# Patient Record
Sex: FEMALE | Race: WHITE | HISPANIC OR LATINO | Employment: OTHER | URBAN - METROPOLITAN AREA
[De-identification: names, ages, dates, MRNs, and addresses within clinical notes are randomized per-mention and may not be internally consistent; named-entity substitution may affect disease eponyms.]

---

## 2017-07-15 ENCOUNTER — HOSPITAL ENCOUNTER (EMERGENCY)
Facility: HOSPITAL | Age: 72
Discharge: HOME | End: 2017-07-15
Attending: EMERGENCY MEDICINE
Payer: MEDICARE

## 2017-07-15 VITALS
RESPIRATION RATE: 18 BRPM | OXYGEN SATURATION: 99 % | WEIGHT: 220 LBS | HEIGHT: 62 IN | TEMPERATURE: 98 F | DIASTOLIC BLOOD PRESSURE: 62 MMHG | SYSTOLIC BLOOD PRESSURE: 207 MMHG | HEART RATE: 67 BPM | BODY MASS INDEX: 40.48 KG/M2

## 2017-07-15 DIAGNOSIS — R07.89 NON-CARDIAC CHEST PAIN: ICD-10-CM

## 2017-07-15 DIAGNOSIS — R07.9 CHEST PAIN: ICD-10-CM

## 2017-07-15 DIAGNOSIS — R73.9 HYPERGLYCEMIA: ICD-10-CM

## 2017-07-15 DIAGNOSIS — N28.9 FUNCTION KIDNEY DECREASED: ICD-10-CM

## 2017-07-15 DIAGNOSIS — I10 ESSENTIAL HYPERTENSION: Primary | ICD-10-CM

## 2017-07-15 LAB
ALBUMIN SERPL BCP-MCNC: 3.6 G/DL
ALP SERPL-CCNC: 101 U/L
ALT SERPL W/O P-5'-P-CCNC: 14 U/L
ANION GAP SERPL CALC-SCNC: 11 MMOL/L
AST SERPL-CCNC: 14 U/L
BASOPHILS # BLD AUTO: 0.01 K/UL
BASOPHILS NFR BLD: 0.1 %
BILIRUB SERPL-MCNC: 0.8 MG/DL
BNP SERPL-MCNC: 472 PG/ML
BUN SERPL-MCNC: 33 MG/DL
CALCIUM SERPL-MCNC: 9 MG/DL
CHLORIDE SERPL-SCNC: 102 MMOL/L
CO2 SERPL-SCNC: 24 MMOL/L
CREAT SERPL-MCNC: 2 MG/DL
DIFFERENTIAL METHOD: ABNORMAL
EOSINOPHIL # BLD AUTO: 0.1 K/UL
EOSINOPHIL NFR BLD: 1.2 %
ERYTHROCYTE [DISTWIDTH] IN BLOOD BY AUTOMATED COUNT: 13.4 %
EST. GFR  (AFRICAN AMERICAN): 29 ML/MIN/1.73 M^2
EST. GFR  (NON AFRICAN AMERICAN): 25 ML/MIN/1.73 M^2
GLUCOSE SERPL-MCNC: 388 MG/DL
HCT VFR BLD AUTO: 41.5 %
HGB BLD-MCNC: 13.2 G/DL
LYMPHOCYTES # BLD AUTO: 1.5 K/UL
LYMPHOCYTES NFR BLD: 22.2 %
MCH RBC QN AUTO: 29.4 PG
MCHC RBC AUTO-ENTMCNC: 31.8 %
MCV RBC AUTO: 92 FL
MONOCYTES # BLD AUTO: 0.3 K/UL
MONOCYTES NFR BLD: 4.6 %
NEUTROPHILS # BLD AUTO: 4.8 K/UL
NEUTROPHILS NFR BLD: 71.6 %
PLATELET # BLD AUTO: 177 K/UL
PMV BLD AUTO: 11.7 FL
POCT GLUCOSE: 308 MG/DL (ref 70–110)
POCT GLUCOSE: 362 MG/DL (ref 70–110)
POTASSIUM SERPL-SCNC: 4.4 MMOL/L
PROT SERPL-MCNC: 6.5 G/DL
RBC # BLD AUTO: 4.49 M/UL
SODIUM SERPL-SCNC: 137 MMOL/L
TROPONIN I SERPL DL<=0.01 NG/ML-MCNC: 0.02 NG/ML
WBC # BLD AUTO: 6.67 K/UL

## 2017-07-15 PROCEDURE — 93010 ELECTROCARDIOGRAM REPORT: CPT | Mod: ,,, | Performed by: INTERNAL MEDICINE

## 2017-07-15 PROCEDURE — 63600175 PHARM REV CODE 636 W HCPCS: Performed by: PHYSICIAN ASSISTANT

## 2017-07-15 PROCEDURE — 85025 COMPLETE CBC W/AUTO DIFF WBC: CPT

## 2017-07-15 PROCEDURE — 83880 ASSAY OF NATRIURETIC PEPTIDE: CPT

## 2017-07-15 PROCEDURE — 82962 GLUCOSE BLOOD TEST: CPT

## 2017-07-15 PROCEDURE — 93005 ELECTROCARDIOGRAM TRACING: CPT

## 2017-07-15 PROCEDURE — 99285 EMERGENCY DEPT VISIT HI MDM: CPT | Mod: 25

## 2017-07-15 PROCEDURE — 25000003 PHARM REV CODE 250: Performed by: PHYSICIAN ASSISTANT

## 2017-07-15 PROCEDURE — 96374 THER/PROPH/DIAG INJ IV PUSH: CPT

## 2017-07-15 PROCEDURE — 25000003 PHARM REV CODE 250: Performed by: EMERGENCY MEDICINE

## 2017-07-15 PROCEDURE — 80053 COMPREHEN METABOLIC PANEL: CPT

## 2017-07-15 PROCEDURE — 84484 ASSAY OF TROPONIN QUANT: CPT

## 2017-07-15 PROCEDURE — 63600175 PHARM REV CODE 636 W HCPCS: Performed by: EMERGENCY MEDICINE

## 2017-07-15 PROCEDURE — 96375 TX/PRO/DX INJ NEW DRUG ADDON: CPT

## 2017-07-15 PROCEDURE — 96372 THER/PROPH/DIAG INJ SC/IM: CPT

## 2017-07-15 RX ORDER — LABETALOL HYDROCHLORIDE 5 MG/ML
10 INJECTION, SOLUTION INTRAVENOUS
Status: COMPLETED | OUTPATIENT
Start: 2017-07-15 | End: 2017-07-15

## 2017-07-15 RX ORDER — MORPHINE SULFATE 2 MG/ML
6 INJECTION, SOLUTION INTRAMUSCULAR; INTRAVENOUS
Status: COMPLETED | OUTPATIENT
Start: 2017-07-15 | End: 2017-07-15

## 2017-07-15 RX ORDER — CARVEDILOL 12.5 MG/1
12.5 TABLET ORAL 2 TIMES DAILY WITH MEALS
Qty: 60 TABLET | Refills: 11 | Status: SHIPPED | OUTPATIENT
Start: 2017-07-15 | End: 2018-07-15

## 2017-07-15 RX ORDER — HYDRALAZINE HYDROCHLORIDE 20 MG/ML
10 INJECTION INTRAMUSCULAR; INTRAVENOUS
Status: COMPLETED | OUTPATIENT
Start: 2017-07-15 | End: 2017-07-15

## 2017-07-15 RX ORDER — HYDRALAZINE HYDROCHLORIDE 20 MG/ML
10 INJECTION INTRAMUSCULAR; INTRAVENOUS
Status: DISCONTINUED | OUTPATIENT
Start: 2017-07-15 | End: 2017-07-15

## 2017-07-15 RX ORDER — ASPIRIN 325 MG
325 TABLET ORAL
Status: COMPLETED | OUTPATIENT
Start: 2017-07-15 | End: 2017-07-15

## 2017-07-15 RX ADMIN — LABETALOL HYDROCHLORIDE 10 MG: 5 INJECTION, SOLUTION INTRAVENOUS at 01:07

## 2017-07-15 RX ADMIN — HYDRALAZINE HYDROCHLORIDE 10 MG: 20 INJECTION INTRAMUSCULAR; INTRAVENOUS at 02:07

## 2017-07-15 RX ADMIN — ASPIRIN 325 MG ORAL TABLET 325 MG: 325 PILL ORAL at 12:07

## 2017-07-15 RX ADMIN — INSULIN HUMAN 10 UNITS: 100 INJECTION, SOLUTION PARENTERAL at 02:07

## 2017-07-15 RX ADMIN — MORPHINE SULFATE 6 MG: 2 INJECTION, SOLUTION INTRAMUSCULAR; INTRAVENOUS at 01:07

## 2017-07-15 NOTE — ED PROVIDER NOTES
"Encounter Date: 7/15/2017       History     Chief Complaint   Patient presents with    Chest Pain     chest pain and back pain began last night; denies fall or injury     Franklin Christian, a 71 y.o. female with PMH significant for DM, HTN, previous MI in 2012, Stage 4 kidney disease and chronic back pain that presents to the ED for chest pain that started yesterday while at rest at [employer].  She describes the pain as "pinching" in nature, worse with certain movement and touch and mildly better with rest.  Pain lasts for about 5 minutes with radiation to her right arm.  Her daughter states that her back pain is chronic in nature and is not over her baseline.  She normally ambulates with the assistance of a cane.  She denies any unilateral weakness, numbness  Treatments tried include administration of Advil yesterday with little improvement of her pain.  She denies any fever, nausea, vomiting, recent fall.  Daughter states that she recently moved from [place] to [place] and has been out of her anti-hypertensive medications x 1 week.  Daughter does not know the name of the hypertensive medication.    She is unsure of the last time she followed with a cardiologist.          The history is provided by the patient and a relative. The history is limited by a language barrier. A  was used.     Review of patient's allergies indicates:  No Known Allergies  Past Medical History:   Diagnosis Date    Diabetes mellitus     Hypertension     MI, old     Renal disorder      Past Surgical History:   Procedure Laterality Date    CARDIAC PACEMAKER PLACEMENT      CARDIAC PACEMAKER PLACEMENT      HYSTERECTOMY      SHOULDER SURGERY       History reviewed. No pertinent family history.  Social History   Substance Use Topics    Smoking status: Former Smoker    Smokeless tobacco: Never Used    Alcohol use No     Review of Systems   Constitutional: Negative for diaphoresis and fever.   Eyes: Negative for " photophobia.   Respiratory: Negative for chest tightness and shortness of breath.    Cardiovascular: Positive for chest pain. Negative for leg swelling.   Gastrointestinal: Negative for abdominal pain, nausea and vomiting.   Musculoskeletal: Positive for back pain (chronic ).   Skin: Negative for color change.   Allergic/Immunologic: Negative for immunocompromised state.   Neurological: Positive for dizziness and headaches. Negative for weakness and numbness.   Psychiatric/Behavioral: Negative for agitation and confusion. The patient is nervous/anxious.    All other systems reviewed and are negative.      Physical Exam     Initial Vitals [07/15/17 1201]   BP Pulse Resp Temp SpO2   (!) 203/84 86 20 98.3 °F (36.8 °C) 99 %      MAP       123.67         Physical Exam    Nursing note and vitals reviewed.  Constitutional: She appears well-developed and well-nourished. She is Obese . No distress.   HENT:   Head: Normocephalic and atraumatic.   Right Ear: External ear normal.   Left Ear: External ear normal.   Nose: Nose normal.   Mouth/Throat: Oropharynx is clear and moist.   Eyes: Conjunctivae and EOM are normal.   Neck: Normal range of motion. No tracheal deviation present.   Cardiovascular: Normal rate and regular rhythm.   Pulmonary/Chest: Breath sounds normal. No respiratory distress. She has no wheezes. She has no rhonchi. She has no rales. She exhibits bony tenderness. She exhibits no tenderness, no crepitus, no edema and no swelling.       Abdominal: Soft. Bowel sounds are normal. She exhibits no distension. There is no tenderness. There is no rebound and no guarding.   Musculoskeletal: Normal range of motion. She exhibits no tenderness.        Cervical back: Normal.        Thoracic back: Normal.        Lumbar back: Normal.   Neurological: She is alert and oriented to person, place, and time. She has normal strength.   Skin: Skin is warm and dry. No rash noted. No erythema.   Psychiatric: She has a normal mood and  affect. Thought content normal.         ED Course   Procedures  Labs Reviewed   CBC W/ AUTO DIFFERENTIAL - Abnormal; Notable for the following:        Result Value    MCHC 31.8 (*)     All other components within normal limits   COMPREHENSIVE METABOLIC PANEL - Abnormal; Notable for the following:     Glucose 388 (*)     BUN, Bld 33 (*)     Creatinine 2.0 (*)     eGFR if  29 (*)     eGFR if non  25 (*)     All other components within normal limits   B-TYPE NATRIURETIC PEPTIDE - Abnormal; Notable for the following:      (*)     All other components within normal limits   POCT GLUCOSE - Abnormal; Notable for the following:     POCT Glucose 362 (*)     All other components within normal limits   TROPONIN I   TROPONIN I        Imaging Results          X-Ray Chest PA And Lateral (Final result)  Result time 07/15/17 12:43:32    Final result by Licha Loera MD (07/15/17 12:43:32)                 Impression:     As above.      Electronically signed by: Licha Loera MD  Date:     07/15/17  Time:    12:43              Narrative:    Chest, 2 views: The lungs are hyperexpanded, but clear.  There is minimal scarring in the left midlung.  The heart is normal in size.  Calcified atheromatous disease affects the aorta.  Left-sided pacemaker device is in place.                                 Medical Decision Making:   Initial Assessment:   Chest pain, chronic back pain, elevated blood pressure  Differential Diagnosis:   ACS, chronic back pain, electrolyte abnormality, costochondritis   Clinical Tests:   Lab Tests: Ordered and Reviewed  The following lab test(s) were unremarkable: CBC, CMP, Troponin and BNP  ED Management:  Patient presents to ED moderately anxious, afebrile and non-toxic.  There is TTP over her left upper chest wall with no evidence of ecchymosis, crepitus or erythema.  Aspirin and morphine given with improvement of pain. Labetalol given without improvement of BP.   Hydrazine was then given and yielded a decrease.   EKG shows paced rhythm with no evidence of STEMI.  CBC, troponin show no acute abnormalities.  CMP shows glucose of 388 and  creatinin . 10 units of insulin were administered.   BNP mildly elevated w/o evidence of effusion on CXR.  CXR shows lungs are hyperexpanded, but clear.  There is minimal scarring in the left midlung.  The heart is normal in size.  Calcified atheromatous disease affects the aorta.  Left-sided pacemaker device is in place.  D/t the reproducibility of the pain, complaint seems most consistent with chest pain that is non-cardiac in nature.  Daughter was given information on symptomatic control and strongly encouraged to have her mother establish care with a PCP and to have her BP check in 2 days with new medication regimen.  Strict return precautions given and patient verbalized understanding.    RX: coreg   Other:   I discussed test(s) with the performing physician.              Attending Attestation:     Physician Attestation Statement for NP/PA:   I have conducted a face to face encounter with this patient in addition to the NP/PA, due to Medical Complexity    Other NP/PA Attestation Additions:      Medical Decision Making: Well appearing patient with chronic back pain p/w back pain, L upper chest pain that is reproducible and atypical.  HTN with DM poorly controlled, patient does not know her medications or have BP medications, moving from [place] to [place] and has not established care.  Does have insulin at home.  Workup reassuring and symptoms improved with pain medicine in the ED.  [facility] family medicine referral given and rx for coreg, will continue insulin as prescribed.                  ED Course     Clinical Impression:   The primary encounter diagnosis was Essential hypertension. Diagnoses of Chest pain, Hyperglycemia, Function kidney decreased, and Non-cardiac chest pain were also pertinent to this visit.                           Erlinda HAYWARD  MARION Gonzalez  07/15/17 1537       Irene Freeman MD  07/15/17 1600

## 2017-07-15 NOTE — ED NOTES
Pt reports chest pain intermittent lasting about 10 minutes that began last night that radiates down rt arm denies injury or trauma pt denies chest pain currently, pt co lt side back pain chronic, pt awake alert oriented, pt family in room with pt, pt denies n/v/d, denies fever, pt has pacemaker in place from 2012 with hx MI, pt has been out of bp medication for approx a week, pt reports rt side headaches that started 2 days ago, pt placed on cardiac monitor with bp cuff and pulse ox

## 2017-08-28 ENCOUNTER — HOSPITAL ENCOUNTER (EMERGENCY)
Facility: HOSPITAL | Age: 72
Discharge: HOME | End: 2017-08-28
Attending: EMERGENCY MEDICINE

## 2017-08-28 VITALS
DIASTOLIC BLOOD PRESSURE: 71 MMHG | TEMPERATURE: 99 F | BODY MASS INDEX: 56.93 KG/M2 | HEART RATE: 72 BPM | RESPIRATION RATE: 18 BRPM | OXYGEN SATURATION: 98 % | SYSTOLIC BLOOD PRESSURE: 156 MMHG | HEIGHT: 60 IN | WEIGHT: 290 LBS

## 2017-08-28 DIAGNOSIS — E11.9 TYPE 2 DIABETES MELLITUS WITHOUT COMPLICATION, WITHOUT LONG-TERM CURRENT USE OF INSULIN: ICD-10-CM

## 2017-08-28 DIAGNOSIS — I10 ESSENTIAL HYPERTENSION: Primary | ICD-10-CM

## 2017-08-28 DIAGNOSIS — R53.1 WEAKNESS: ICD-10-CM

## 2017-08-28 DIAGNOSIS — N39.0 URINARY TRACT INFECTION WITHOUT HEMATURIA, SITE UNSPECIFIED: ICD-10-CM

## 2017-08-28 LAB
ALBUMIN SERPL BCP-MCNC: 3.8 G/DL
ALP SERPL-CCNC: 101 U/L
ALT SERPL W/O P-5'-P-CCNC: 15 U/L
ANION GAP SERPL CALC-SCNC: 8 MMOL/L
AST SERPL-CCNC: 14 U/L
BACTERIA #/AREA URNS HPF: ABNORMAL /HPF
BASOPHILS # BLD AUTO: 0.01 K/UL
BASOPHILS NFR BLD: 0.2 %
BILIRUB SERPL-MCNC: 0.8 MG/DL
BILIRUB UR QL STRIP: NEGATIVE
BNP SERPL-MCNC: 441 PG/ML
BUN SERPL-MCNC: 34 MG/DL
CALCIUM SERPL-MCNC: 9.2 MG/DL
CHLORIDE SERPL-SCNC: 103 MMOL/L
CK SERPL-CCNC: 90 U/L
CLARITY UR: CLEAR
CO2 SERPL-SCNC: 30 MMOL/L
COLOR UR: YELLOW
CREAT SERPL-MCNC: 1.8 MG/DL
DIFFERENTIAL METHOD: ABNORMAL
EOSINOPHIL # BLD AUTO: 0.1 K/UL
EOSINOPHIL NFR BLD: 1.4 %
ERYTHROCYTE [DISTWIDTH] IN BLOOD BY AUTOMATED COUNT: 13.2 %
EST. GFR  (AFRICAN AMERICAN): 32 ML/MIN/1.73 M^2
EST. GFR  (NON AFRICAN AMERICAN): 28 ML/MIN/1.73 M^2
GLUCOSE SERPL-MCNC: 221 MG/DL
GLUCOSE UR QL STRIP: ABNORMAL
HCT VFR BLD AUTO: 43.7 %
HGB BLD-MCNC: 14.2 G/DL
HGB UR QL STRIP: ABNORMAL
HYALINE CASTS #/AREA URNS LPF: 0 /LPF
KETONES UR QL STRIP: NEGATIVE
LEUKOCYTE ESTERASE UR QL STRIP: NEGATIVE
LYMPHOCYTES # BLD AUTO: 0.6 K/UL
LYMPHOCYTES NFR BLD: 10.5 %
MAGNESIUM SERPL-MCNC: 1.7 MG/DL
MCH RBC QN AUTO: 30.1 PG
MCHC RBC AUTO-ENTMCNC: 32.5 G/DL
MCV RBC AUTO: 93 FL
MICROSCOPIC COMMENT: ABNORMAL
MONOCYTES # BLD AUTO: 0.3 K/UL
MONOCYTES NFR BLD: 5.6 %
NEUTROPHILS # BLD AUTO: 4.9 K/UL
NEUTROPHILS NFR BLD: 82 %
NITRITE UR QL STRIP: POSITIVE
PH UR STRIP: 7 [PH] (ref 5–8)
PLATELET # BLD AUTO: 174 K/UL
PMV BLD AUTO: 11.4 FL
POTASSIUM SERPL-SCNC: 4.2 MMOL/L
PROT SERPL-MCNC: 6.9 G/DL
PROT UR QL STRIP: ABNORMAL
RBC # BLD AUTO: 4.72 M/UL
RBC #/AREA URNS HPF: 3 /HPF (ref 0–4)
SODIUM SERPL-SCNC: 141 MMOL/L
SP GR UR STRIP: 1.02 (ref 1–1.03)
SQUAMOUS #/AREA URNS HPF: 3 /HPF
TROPONIN I SERPL DL<=0.01 NG/ML-MCNC: 0.01 NG/ML
URN SPEC COLLECT METH UR: ABNORMAL
UROBILINOGEN UR STRIP-ACNC: NEGATIVE EU/DL
WBC # BLD AUTO: 5.91 K/UL
WBC #/AREA URNS HPF: 15 /HPF (ref 0–5)
YEAST URNS QL MICRO: ABNORMAL

## 2017-08-28 PROCEDURE — 83880 ASSAY OF NATRIURETIC PEPTIDE: CPT

## 2017-08-28 PROCEDURE — 63600175 PHARM REV CODE 636 W HCPCS: Performed by: EMERGENCY MEDICINE

## 2017-08-28 PROCEDURE — 96365 THER/PROPH/DIAG IV INF INIT: CPT

## 2017-08-28 PROCEDURE — 96375 TX/PRO/DX INJ NEW DRUG ADDON: CPT

## 2017-08-28 PROCEDURE — 93005 ELECTROCARDIOGRAM TRACING: CPT

## 2017-08-28 PROCEDURE — 87086 URINE CULTURE/COLONY COUNT: CPT

## 2017-08-28 PROCEDURE — 81000 URINALYSIS NONAUTO W/SCOPE: CPT

## 2017-08-28 PROCEDURE — 83735 ASSAY OF MAGNESIUM: CPT

## 2017-08-28 PROCEDURE — 82550 ASSAY OF CK (CPK): CPT

## 2017-08-28 PROCEDURE — 99285 EMERGENCY DEPT VISIT HI MDM: CPT | Mod: 25

## 2017-08-28 PROCEDURE — 93010 ELECTROCARDIOGRAM REPORT: CPT | Mod: ,,, | Performed by: INTERNAL MEDICINE

## 2017-08-28 PROCEDURE — 84484 ASSAY OF TROPONIN QUANT: CPT

## 2017-08-28 PROCEDURE — 85025 COMPLETE CBC W/AUTO DIFF WBC: CPT

## 2017-08-28 PROCEDURE — 80053 COMPREHEN METABOLIC PANEL: CPT

## 2017-08-28 RX ORDER — KETOROLAC TROMETHAMINE 30 MG/ML
15 INJECTION, SOLUTION INTRAMUSCULAR; INTRAVENOUS
Status: COMPLETED | OUTPATIENT
Start: 2017-08-28 | End: 2017-08-28

## 2017-08-28 RX ORDER — LISINOPRIL 10 MG/1
10 TABLET ORAL DAILY
Qty: 30 TABLET | Refills: 6 | Status: ON HOLD | OUTPATIENT
Start: 2017-08-28 | End: 2018-08-28

## 2017-08-28 RX ORDER — SULFAMETHOXAZOLE AND TRIMETHOPRIM 800; 160 MG/1; MG/1
1 TABLET ORAL 2 TIMES DAILY
Qty: 10 TABLET | Refills: 0 | Status: SHIPPED | OUTPATIENT
Start: 2017-08-28 | End: 2017-09-04

## 2017-08-28 RX ADMIN — KETOROLAC TROMETHAMINE 15 MG: 30 INJECTION, SOLUTION INTRAMUSCULAR at 07:08

## 2017-08-28 RX ADMIN — CEFTRIAXONE 1 G: 1 INJECTION, SOLUTION INTRAVENOUS at 07:08

## 2017-08-29 NOTE — ED TRIAGE NOTES
headache, coughing, sore throat, and coughing since waking up this orning.  Also, complaints of left chest pain, and abdominal pain.  Has not taken any of her medications for over one month.

## 2017-08-29 NOTE — ED PROVIDER NOTES
Encounter Date: 8/28/2017       History     Chief Complaint   Patient presents with    Headache     headache, coughing, sore throat, and coughing since waking up this orning.  Also, complaints of left chest pain, and abdominal pain.  Has not taken any of her medications for over one month.     Patient is a 72-year-old female with a history of hypertension and diabetes who presents with multiple medical complaints.  Patient complains of intermittent chest pain with a dry cough.  She is also had intermittent abdominal pain and generalized body aches.  No known fever or chills.  Patient has not taken any of her medications recently because she ran out.  She does not have a primary physician to follow-up with.      The history is provided by the patient. The history is limited by a language barrier. A  was used.     Review of patient's allergies indicates:  No Known Allergies  Past Medical History:   Diagnosis Date    Diabetes mellitus     Hypertension      Past Surgical History:   Procedure Laterality Date    CARDIAC PACEMAKER PLACEMENT       History reviewed. No pertinent family history.  Social History   Substance Use Topics    Smoking status: Never Smoker    Smokeless tobacco: Never Used    Alcohol use Not on file     Review of Systems   Constitutional: Negative for fever.   Respiratory: Positive for cough.    Cardiovascular: Positive for chest pain.   Gastrointestinal: Positive for abdominal pain. Negative for diarrhea and vomiting.   Musculoskeletal: Positive for myalgias.   Neurological: Positive for weakness and headaches. Negative for dizziness and numbness.   All other systems reviewed and are negative.      Physical Exam     Initial Vitals [08/28/17 1754]   BP Pulse Resp Temp SpO2   (!) 186/78 81 20 98.7 °F (37.1 °C) 99 %      MAP       114         Physical Exam    Nursing note and vitals reviewed.  Constitutional: She appears well-developed and well-nourished.   HENT:   Head:  Normocephalic and atraumatic.   Eyes: Conjunctivae and EOM are normal. Pupils are equal, round, and reactive to light.   Neck: Normal range of motion. Neck supple.   Cardiovascular: Normal rate, regular rhythm and normal heart sounds.   Pulmonary/Chest: Breath sounds normal.   Abdominal: Soft. There is no tenderness. There is no rebound and no guarding.   Musculoskeletal: Normal range of motion.   Neurological: She is alert and oriented to person, place, and time. She has normal strength.   Skin: Skin is warm and dry.   Psychiatric: She has a normal mood and affect.         ED Course   Procedures  Labs Reviewed   CBC W/ AUTO DIFFERENTIAL - Abnormal; Notable for the following:        Result Value    Lymph # 0.6 (*)     Gran% 82.0 (*)     Lymph% 10.5 (*)     All other components within normal limits   COMPREHENSIVE METABOLIC PANEL - Abnormal; Notable for the following:     CO2 30 (*)     Glucose 221 (*)     BUN, Bld 34 (*)     Creatinine 1.8 (*)     eGFR if  32 (*)     eGFR if non  28 (*)     All other components within normal limits   URINALYSIS - Abnormal; Notable for the following:     Protein, UA 1+ (*)     Glucose, UA 3+ (*)     Occult Blood UA Trace (*)     Nitrite, UA Positive (*)     All other components within normal limits   B-TYPE NATRIURETIC PEPTIDE - Abnormal; Notable for the following:      (*)     All other components within normal limits   URINALYSIS MICROSCOPIC - Abnormal; Notable for the following:     WBC, UA 15 (*)     All other components within normal limits   CULTURE, URINE   CK   TROPONIN I   MAGNESIUM     EKG Readings: (Independently Interpreted)   Heart Rate: 86. Clinical Impression: Paced Rhythm          Medical Decision Making:   ED Management:  72-year-old female with diabetes and hypertension who ran out of all her medications and does not have a physician to follow-up with.  She was given 50 mg of Toradol for headache.  She is also found to have a  UTI.  She was given 1 g of Rocephin.  I will place her on Bactrim and write her a prescription for lisinopril.  I have supplied her with information to follow-up with her primary care physician as soon as able for recheck and further treatment as warranted.                   ED Course     Clinical Impression:   The primary encounter diagnosis was Essential hypertension. Diagnoses of Weakness, Type 2 diabetes mellitus without complication, without long-term current use of insulin, and Urinary tract infection without hematuria, site unspecified were also pertinent to this visit.                           Wing Goncalves MD  08/28/17 2622

## 2017-08-29 NOTE — ED NOTES
"Using language  number [id number] pt states she has no primary MD, pt states " I took insulin, but unsure what kind or how much, pt unsure which blood pressure med sure had   "

## 2017-08-30 LAB
BACTERIA UR CULT: NORMAL
BACTERIA UR CULT: NORMAL

## 2017-10-11 ENCOUNTER — HOSPITAL ENCOUNTER (EMERGENCY)
Facility: HOSPITAL | Age: 72
Discharge: HOME | End: 2017-10-11
Attending: EMERGENCY MEDICINE
Payer: MEDICARE

## 2017-10-11 VITALS
HEART RATE: 65 BPM | SYSTOLIC BLOOD PRESSURE: 195 MMHG | TEMPERATURE: 98 F | WEIGHT: 220 LBS | RESPIRATION RATE: 20 BRPM | DIASTOLIC BLOOD PRESSURE: 81 MMHG | OXYGEN SATURATION: 98 % | BODY MASS INDEX: 40.48 KG/M2 | HEIGHT: 62 IN

## 2017-10-11 DIAGNOSIS — R05.9 COUGH: ICD-10-CM

## 2017-10-11 DIAGNOSIS — R06.02 SHORTNESS OF BREATH: ICD-10-CM

## 2017-10-11 LAB
ALBUMIN SERPL BCP-MCNC: 3.4 G/DL
ALP SERPL-CCNC: 97 U/L
ALT SERPL W/O P-5'-P-CCNC: 10 U/L
ANION GAP SERPL CALC-SCNC: 8 MMOL/L
AST SERPL-CCNC: 12 U/L
BASOPHILS # BLD AUTO: 0.02 K/UL
BASOPHILS NFR BLD: 0.3 %
BILIRUB SERPL-MCNC: 0.4 MG/DL
BUN SERPL-MCNC: 26 MG/DL
CALCIUM SERPL-MCNC: 8.9 MG/DL
CHLORIDE SERPL-SCNC: 102 MMOL/L
CO2 SERPL-SCNC: 28 MMOL/L
CREAT SERPL-MCNC: 1.8 MG/DL
DIFFERENTIAL METHOD: ABNORMAL
EOSINOPHIL # BLD AUTO: 0.2 K/UL
EOSINOPHIL NFR BLD: 2.8 %
ERYTHROCYTE [DISTWIDTH] IN BLOOD BY AUTOMATED COUNT: 13.5 %
EST. GFR  (AFRICAN AMERICAN): 33 ML/MIN/1.73 M^2
EST. GFR  (NON AFRICAN AMERICAN): 28 ML/MIN/1.73 M^2
FLUAV AG SPEC QL IA: NEGATIVE
FLUBV AG SPEC QL IA: NEGATIVE
GLUCOSE SERPL-MCNC: 334 MG/DL
HCT VFR BLD AUTO: 40 %
HGB BLD-MCNC: 12.7 G/DL
LYMPHOCYTES # BLD AUTO: 1.8 K/UL
LYMPHOCYTES NFR BLD: 26.8 %
MCH RBC QN AUTO: 29.8 PG
MCHC RBC AUTO-ENTMCNC: 31.8 G/DL
MCV RBC AUTO: 94 FL
MONOCYTES # BLD AUTO: 0.4 K/UL
MONOCYTES NFR BLD: 5.8 %
NEUTROPHILS # BLD AUTO: 4.3 K/UL
NEUTROPHILS NFR BLD: 63.9 %
PLATELET # BLD AUTO: 168 K/UL
PMV BLD AUTO: 11.1 FL
POTASSIUM SERPL-SCNC: 4.4 MMOL/L
PROT SERPL-MCNC: 6.4 G/DL
RBC # BLD AUTO: 4.26 M/UL
SODIUM SERPL-SCNC: 138 MMOL/L
SPECIMEN SOURCE: NORMAL
TROPONIN I SERPL DL<=0.01 NG/ML-MCNC: 0.02 NG/ML
WBC # BLD AUTO: 6.76 K/UL

## 2017-10-11 PROCEDURE — 63600175 PHARM REV CODE 636 W HCPCS: Performed by: EMERGENCY MEDICINE

## 2017-10-11 PROCEDURE — 94640 AIRWAY INHALATION TREATMENT: CPT

## 2017-10-11 PROCEDURE — 80053 COMPREHEN METABOLIC PANEL: CPT

## 2017-10-11 PROCEDURE — 84484 ASSAY OF TROPONIN QUANT: CPT

## 2017-10-11 PROCEDURE — 93005 ELECTROCARDIOGRAM TRACING: CPT

## 2017-10-11 PROCEDURE — 87400 INFLUENZA A/B EACH AG IA: CPT

## 2017-10-11 PROCEDURE — 25000003 PHARM REV CODE 250: Performed by: EMERGENCY MEDICINE

## 2017-10-11 PROCEDURE — 25000242 PHARM REV CODE 250 ALT 637 W/ HCPCS: Performed by: EMERGENCY MEDICINE

## 2017-10-11 PROCEDURE — 99284 EMERGENCY DEPT VISIT MOD MDM: CPT | Mod: 25

## 2017-10-11 PROCEDURE — 85025 COMPLETE CBC W/AUTO DIFF WBC: CPT

## 2017-10-11 RX ORDER — PREDNISONE 20 MG/1
60 TABLET ORAL
Status: COMPLETED | OUTPATIENT
Start: 2017-10-11 | End: 2017-10-11

## 2017-10-11 RX ORDER — IPRATROPIUM BROMIDE AND ALBUTEROL SULFATE 2.5; .5 MG/3ML; MG/3ML
3 SOLUTION RESPIRATORY (INHALATION)
Status: COMPLETED | OUTPATIENT
Start: 2017-10-11 | End: 2017-10-11

## 2017-10-11 RX ORDER — METHYLPREDNISOLONE 4 MG/1
TABLET ORAL
Qty: 1 PACKAGE | Refills: 0 | Status: ON HOLD | OUTPATIENT
Start: 2017-10-11 | End: 2018-06-10 | Stop reason: HOSPADM

## 2017-10-11 RX ORDER — ALBUTEROL SULFATE 90 UG/1
1-2 AEROSOL, METERED RESPIRATORY (INHALATION) EVERY 6 HOURS PRN
Qty: 1 INHALER | Refills: 0 | Status: SHIPPED | OUTPATIENT
Start: 2017-10-11

## 2017-10-11 RX ORDER — OXYCODONE AND ACETAMINOPHEN 5; 325 MG/1; MG/1
2 TABLET ORAL
Status: COMPLETED | OUTPATIENT
Start: 2017-10-11 | End: 2017-10-11

## 2017-10-11 RX ADMIN — OXYCODONE AND ACETAMINOPHEN 2 TABLET: 5; 325 TABLET ORAL at 05:10

## 2017-10-11 RX ADMIN — PREDNISONE 60 MG: 20 TABLET ORAL at 05:10

## 2017-10-11 RX ADMIN — IPRATROPIUM BROMIDE AND ALBUTEROL SULFATE 3 ML: .5; 3 SOLUTION RESPIRATORY (INHALATION) at 05:10

## 2017-10-11 NOTE — ED NOTES
Pt presents to ED with c/o cough since Sunday. Onset of abdominal pain, N/V and diarrhea since this morning. Pt reports green sputum with cough. NAD noted at present. Denies any changes to eating pattern.

## 2017-10-11 NOTE — ED PROVIDER NOTES
Encounter Date: 10/11/2017       History     Chief Complaint   Patient presents with    Cough     pt c/o productive cough with yellow/green sputum x 3 days      71-year-old female with a history of COPD, and hypertension, previous heart disease, and pacemaker placement, comes into the emergency department with cough, body aches, as well as diarrhea for the last 2 days.  She denies fevers, but has chills.  She is posted take breathing treatments at night, however it is broken, so she has not been using it.    Conversation occurs through the .      The history is provided by the patient. The history is limited by a language barrier. A  was used.     Review of patient's allergies indicates:  No Known Allergies  Past Medical History:   Diagnosis Date    Diabetes mellitus     Hypertension     MI, old     Renal disorder      Past Surgical History:   Procedure Laterality Date    CARDIAC PACEMAKER PLACEMENT      CARDIAC PACEMAKER PLACEMENT      HYSTERECTOMY      SHOULDER SURGERY       History reviewed. No pertinent family history.  Social History   Substance Use Topics    Smoking status: Former Smoker    Smokeless tobacco: Never Used    Alcohol use No     Review of Systems   Endocrine: Negative.    Genitourinary: Negative.    Allergic/Immunologic: Negative.    Neurological: Negative.    All other systems reviewed and are negative.      Physical Exam     Initial Vitals [10/11/17 1508]   BP Pulse Resp Temp SpO2   (!) 166/7 84 16 98.1 °F (36.7 °C) 97 %      MAP       60         Physical Exam    Nursing note and vitals reviewed.  Constitutional: She appears distressed.   Chronically ill-appearing   HENT:   Head: Normocephalic and atraumatic.   Eyes: EOM are normal. Pupils are equal, round, and reactive to light.   Neck: Normal range of motion. Neck supple.   Cardiovascular: Normal rate and normal heart sounds.   Pulmonary/Chest: She has wheezes.   Abdominal: Soft.   Musculoskeletal:  Normal range of motion.   Neurological: She is alert and oriented to person, place, and time. She has normal strength. No cranial nerve deficit.   Skin: Skin is warm and dry.   Psychiatric: She has a normal mood and affect. Her behavior is normal. Thought content normal.         ED Course   Procedures  Labs Reviewed   COMPREHENSIVE METABOLIC PANEL - Abnormal; Notable for the following:        Result Value    Glucose 334 (*)     BUN, Bld 26 (*)     Creatinine 1.8 (*)     Albumin 3.4 (*)     eGFR if  33 (*)     eGFR if non  28 (*)     All other components within normal limits   CBC W/ AUTO DIFFERENTIAL - Abnormal; Notable for the following:     MCHC 31.8 (*)     All other components within normal limits   TROPONIN I   INFLUENZA A AND B ANTIGEN          X-Rays:   Independently Interpreted Readings:   Other Readings:  Chest Xray was independently reviewed by me; I agree with radiologist interpretation.       Medical Decision Making:   Initial Assessment:   71-year-old female with a history of coronary artery disease, pacemaker placement, COPD here with cough, body aches and fatigue  Differential Diagnosis:   COPD, viral illness, influenza, an STEMI  Clinical Tests:   Lab Tests: Ordered and Reviewed  Radiological Study: Ordered and Reviewed  ED Management:  CXR without pneumonia  Pt felt better with nebulizer and steroid    Recommended with close f/u and given resources    Imaging Results          X-Ray Chest PA And Lateral (Final result)  Result time 10/11/17 16:51:32    Final result by Kostas Lynch MD (10/11/17 16:51:32)                 Impression:        No acute radiographic findings and no significant detrimental change.      Electronically signed by: KOSTAS LYNCH MD  Date:     10/11/17  Time:    16:51              Narrative:    Comparison: 7/15/17    Technique: Chest PA and lateral.    Findings: Cardiac silhouette and pulmonary vascularity are within normal range.  There is  extensive aortic atherosclerosis.  There is a left-sided pacemaker in stable position.  Lungs are symmetrically expanded without evidence of significant focal consolidation, large effusion, or pneumothorax.  Stable scarring at the left lung base.  Bones demonstrate no acute abnormality.                                             ED Course as of Oct 11 1904   Wed Oct 11, 2017   1842 Awaiting trop  [MG]   1900 Troponin normal. Patient feeling better. Will dc home with medrol dose pack with close follow up  [MG]      ED Course User Index  [MG] Maeve Cade MD     Clinical Impression:   Diagnoses of Cough and Shortness of breath were pertinent to this visit.                           Maeve Cade MD  10/11/17 1904

## 2017-11-11 ENCOUNTER — HOSPITAL ENCOUNTER (EMERGENCY)
Facility: HOSPITAL | Age: 72
Discharge: HOME | End: 2017-11-11
Attending: EMERGENCY MEDICINE
Payer: MEDICARE

## 2017-11-11 VITALS
HEIGHT: 62 IN | RESPIRATION RATE: 17 BRPM | OXYGEN SATURATION: 97 % | HEART RATE: 63 BPM | BODY MASS INDEX: 40.48 KG/M2 | TEMPERATURE: 98 F | WEIGHT: 220 LBS | SYSTOLIC BLOOD PRESSURE: 174 MMHG | DIASTOLIC BLOOD PRESSURE: 77 MMHG

## 2017-11-11 DIAGNOSIS — I10 HYPERTENSION, UNSPECIFIED TYPE: ICD-10-CM

## 2017-11-11 DIAGNOSIS — N93.8 DYSFUNCTIONAL UTERINE BLEEDING: Primary | ICD-10-CM

## 2017-11-11 DIAGNOSIS — R73.9 HYPERGLYCEMIA: ICD-10-CM

## 2017-11-11 DIAGNOSIS — R10.2 PELVIC PAIN: ICD-10-CM

## 2017-11-11 DIAGNOSIS — G62.9 NEUROPATHY: ICD-10-CM

## 2017-11-11 LAB
ABO + RH BLD: NORMAL
ALBUMIN SERPL BCP-MCNC: 3.6 G/DL
ALP SERPL-CCNC: 107 U/L
ALT SERPL W/O P-5'-P-CCNC: 14 U/L
ANION GAP SERPL CALC-SCNC: 8 MMOL/L
AST SERPL-CCNC: 17 U/L
BACTERIA #/AREA URNS HPF: ABNORMAL /HPF
BASOPHILS # BLD AUTO: 0.01 K/UL
BASOPHILS NFR BLD: 0.1 %
BILIRUB SERPL-MCNC: 0.4 MG/DL
BILIRUB UR QL STRIP: NEGATIVE
BLD GP AB SCN CELLS X3 SERPL QL: NORMAL
BUN SERPL-MCNC: 27 MG/DL
CALCIUM SERPL-MCNC: 9.1 MG/DL
CHLORIDE SERPL-SCNC: 105 MMOL/L
CLARITY UR: CLEAR
CO2 SERPL-SCNC: 27 MMOL/L
COLOR UR: YELLOW
CREAT SERPL-MCNC: 1.9 MG/DL
DIFFERENTIAL METHOD: NORMAL
EOSINOPHIL # BLD AUTO: 0.1 K/UL
EOSINOPHIL NFR BLD: 1.7 %
ERYTHROCYTE [DISTWIDTH] IN BLOOD BY AUTOMATED COUNT: 13.8 %
EST. GFR  (AFRICAN AMERICAN): 31 ML/MIN/1.73 M^2
EST. GFR  (NON AFRICAN AMERICAN): 27 ML/MIN/1.73 M^2
GLUCOSE SERPL-MCNC: 281 MG/DL
GLUCOSE UR QL STRIP: ABNORMAL
HCT VFR BLD AUTO: 42.9 %
HGB BLD-MCNC: 13.8 G/DL
HGB UR QL STRIP: ABNORMAL
HYALINE CASTS #/AREA URNS LPF: 0 /LPF
KETONES UR QL STRIP: NEGATIVE
LACTATE SERPL-SCNC: 0.8 MMOL/L
LEUKOCYTE ESTERASE UR QL STRIP: NEGATIVE
LIPASE SERPL-CCNC: 34 U/L
LYMPHOCYTES # BLD AUTO: 1.9 K/UL
LYMPHOCYTES NFR BLD: 27.1 %
MAGNESIUM SERPL-MCNC: 1.7 MG/DL
MCH RBC QN AUTO: 30.1 PG
MCHC RBC AUTO-ENTMCNC: 32.2 G/DL
MCV RBC AUTO: 94 FL
MICROSCOPIC COMMENT: ABNORMAL
MONOCYTES # BLD AUTO: 0.4 K/UL
MONOCYTES NFR BLD: 5.7 %
NEUTROPHILS # BLD AUTO: 4.5 K/UL
NEUTROPHILS NFR BLD: 65 %
NITRITE UR QL STRIP: NEGATIVE
PH UR STRIP: 7 [PH] (ref 5–8)
PLATELET # BLD AUTO: 196 K/UL
PMV BLD AUTO: 11.5 FL
POTASSIUM SERPL-SCNC: 4.2 MMOL/L
PROT SERPL-MCNC: 7.1 G/DL
PROT UR QL STRIP: ABNORMAL
RBC # BLD AUTO: 4.59 M/UL
RBC #/AREA URNS HPF: 10 /HPF (ref 0–4)
SODIUM SERPL-SCNC: 140 MMOL/L
SP GR UR STRIP: 1.01 (ref 1–1.03)
TSH SERPL DL<=0.005 MIU/L-ACNC: 2.94 UIU/ML
URN SPEC COLLECT METH UR: ABNORMAL
UROBILINOGEN UR STRIP-ACNC: NEGATIVE EU/DL
WBC # BLD AUTO: 6.87 K/UL
WBC #/AREA URNS HPF: 5 /HPF (ref 0–5)
YEAST URNS QL MICRO: ABNORMAL

## 2017-11-11 PROCEDURE — 80053 COMPREHEN METABOLIC PANEL: CPT

## 2017-11-11 PROCEDURE — 81000 URINALYSIS NONAUTO W/SCOPE: CPT

## 2017-11-11 PROCEDURE — 99285 EMERGENCY DEPT VISIT HI MDM: CPT | Mod: 25

## 2017-11-11 PROCEDURE — 86901 BLOOD TYPING SEROLOGIC RH(D): CPT

## 2017-11-11 PROCEDURE — 83690 ASSAY OF LIPASE: CPT

## 2017-11-11 PROCEDURE — 85025 COMPLETE CBC W/AUTO DIFF WBC: CPT

## 2017-11-11 PROCEDURE — 83605 ASSAY OF LACTIC ACID: CPT

## 2017-11-11 PROCEDURE — 63600175 PHARM REV CODE 636 W HCPCS: Performed by: EMERGENCY MEDICINE

## 2017-11-11 PROCEDURE — 96361 HYDRATE IV INFUSION ADD-ON: CPT

## 2017-11-11 PROCEDURE — 25000003 PHARM REV CODE 250: Performed by: EMERGENCY MEDICINE

## 2017-11-11 PROCEDURE — 96375 TX/PRO/DX INJ NEW DRUG ADDON: CPT

## 2017-11-11 PROCEDURE — 83735 ASSAY OF MAGNESIUM: CPT

## 2017-11-11 PROCEDURE — 96374 THER/PROPH/DIAG INJ IV PUSH: CPT

## 2017-11-11 PROCEDURE — 86900 BLOOD TYPING SEROLOGIC ABO: CPT

## 2017-11-11 PROCEDURE — 84443 ASSAY THYROID STIM HORMONE: CPT

## 2017-11-11 RX ORDER — DOCUSATE SODIUM 100 MG/1
100 CAPSULE, LIQUID FILLED ORAL DAILY
Status: DISCONTINUED | OUTPATIENT
Start: 2017-11-12 | End: 2017-11-11

## 2017-11-11 RX ORDER — LABETALOL HYDROCHLORIDE 5 MG/ML
10 INJECTION, SOLUTION INTRAVENOUS
Status: COMPLETED | OUTPATIENT
Start: 2017-11-11 | End: 2017-11-11

## 2017-11-11 RX ORDER — ONDANSETRON 2 MG/ML
4 INJECTION INTRAMUSCULAR; INTRAVENOUS
Status: COMPLETED | OUTPATIENT
Start: 2017-11-11 | End: 2017-11-11

## 2017-11-11 RX ORDER — DOCUSATE SODIUM 100 MG/1
100 CAPSULE, LIQUID FILLED ORAL
Status: COMPLETED | OUTPATIENT
Start: 2017-11-11 | End: 2017-11-11

## 2017-11-11 RX ORDER — GABAPENTIN 300 MG/1
300 CAPSULE ORAL NIGHTLY
Qty: 20 CAPSULE | Refills: 0 | Status: ON HOLD | OUTPATIENT
Start: 2017-11-11 | End: 2019-01-23 | Stop reason: HOSPADM

## 2017-11-11 RX ORDER — HYDROCODONE BITARTRATE AND ACETAMINOPHEN 5; 325 MG/1; MG/1
1 TABLET ORAL
Status: COMPLETED | OUTPATIENT
Start: 2017-11-11 | End: 2017-11-11

## 2017-11-11 RX ORDER — DOCUSATE SODIUM 100 MG/1
100 CAPSULE, LIQUID FILLED ORAL 2 TIMES DAILY
Qty: 30 CAPSULE | Refills: 0 | Status: SHIPPED | OUTPATIENT
Start: 2017-11-11 | End: 2017-11-26

## 2017-11-11 RX ORDER — DOCUSATE SODIUM 100 MG/1
100 CAPSULE, LIQUID FILLED ORAL
Status: DISCONTINUED | OUTPATIENT
Start: 2017-11-11 | End: 2017-11-11

## 2017-11-11 RX ORDER — HYDROCODONE BITARTRATE AND ACETAMINOPHEN 5; 325 MG/1; MG/1
1 TABLET ORAL EVERY 6 HOURS PRN
Qty: 12 TABLET | Refills: 0 | Status: ON HOLD | OUTPATIENT
Start: 2017-11-11 | End: 2019-01-23 | Stop reason: HOSPADM

## 2017-11-11 RX ORDER — MORPHINE SULFATE 10 MG/ML
4 INJECTION INTRAMUSCULAR; INTRAVENOUS; SUBCUTANEOUS
Status: COMPLETED | OUTPATIENT
Start: 2017-11-11 | End: 2017-11-11

## 2017-11-11 RX ADMIN — ONDANSETRON 4 MG: 2 INJECTION INTRAMUSCULAR; INTRAVENOUS at 07:11

## 2017-11-11 RX ADMIN — MORPHINE SULFATE 4 MG: 10 INJECTION INTRAVENOUS at 07:11

## 2017-11-11 RX ADMIN — DOCUSATE SODIUM 100 MG: 100 CAPSULE, LIQUID FILLED ORAL at 09:11

## 2017-11-11 RX ADMIN — HYDROCODONE BITARTRATE AND ACETAMINOPHEN 1 TABLET: 5; 325 TABLET ORAL at 09:11

## 2017-11-11 RX ADMIN — LABETALOL HYDROCHLORIDE 10 MG: 5 INJECTION INTRAVENOUS at 08:11

## 2017-11-11 RX ADMIN — SODIUM CHLORIDE 1000 ML: 0.9 INJECTION, SOLUTION INTRAVENOUS at 07:11

## 2017-11-12 NOTE — ED PROVIDER NOTES
Encounter Date: 11/11/2017    SCRIBE #1 NOTE: I, Regis Olvera, am scribing for, and in the presence of,  Garrison Ochoa MD. I have scribed the entire note.       History     Chief Complaint   Patient presents with    Vaginal Bleeding     vaginal spotting x 2 days, bleeding x 1 day     Time patient was seen by the provider: 6:47 PM      The patient is a 71 y.o. female with hx of: NIDDM, Arterial hypertension, CAD that presents to the ED with a complaint of vaginal bleeding for the past week. She states is it persistent through the day, dripping in the toilet and on the floor especially in the last day. She noted vaginal pain and low abdomen. She reports a large amount of urination but no change with associated dizziness, diarrhea, nausea, and vomiting. She had rhinorrhea and cold symptoms on both sides. She also reports palpitations intermittently but no regularly. She offers HX of chronic on going vision problems.     At baseline the patient is not compliant with medicines and is supposed to be on home oxygen but does not have this.           Review of patient's allergies indicates:  No Known Allergies  Past Medical History:   Diagnosis Date    Diabetes mellitus     Hypertension     MI, old     Renal disorder      Past Surgical History:   Procedure Laterality Date    CARDIAC PACEMAKER PLACEMENT      CARDIAC PACEMAKER PLACEMENT      HYSTERECTOMY      SHOULDER SURGERY       No family history on file.  Social History   Substance Use Topics    Smoking status: Former Smoker    Smokeless tobacco: Never Used    Alcohol use No     Review of Systems   Constitutional: Negative for fever.   HENT: Positive for congestion. Negative for sore throat.    Respiratory: Negative for shortness of breath.    Cardiovascular: Negative for chest pain.   Gastrointestinal: Positive for abdominal pain, diarrhea, nausea and vomiting.   Genitourinary: Positive for vaginal bleeding and vaginal pain. Negative for dysuria.        Increased  urine   Musculoskeletal: Negative for back pain.   Skin: Negative for rash.   Neurological: Positive for dizziness. Negative for weakness.   Hematological: Does not bruise/bleed easily.       Physical Exam     Initial Vitals [11/11/17 1827]   BP Pulse Resp Temp SpO2   (!) 168/110 -- -- -- --      MAP       129.33         Physical Exam    Nursing note and vitals reviewed.  Constitutional: She appears well-developed.   HENT:   Head: Normocephalic and atraumatic.   Mouth/Throat: Oropharynx is clear and moist.   Moist no lesions   Eyes: Conjunctivae and EOM are normal. Pupils are equal, round, and reactive to light. No scleral icterus.   conjunctiva are pink   Neck: Normal range of motion. Neck supple.   Tenderness on the scalp near the right nocciput but not   Cardiovascular: Normal rate, regular rhythm, normal heart sounds and intact distal pulses. Exam reveals no gallop and no friction rub.    No murmur heard.  S1/s2    Radial pulses are 2+ and even    Pacer in right chest- feels shocking sensation occasionally     Pulmonary/Chest: Breath sounds normal. She has no wheezes. She has no rhonchi. She has no rales.   Abdominal: Soft. She exhibits no distension. There is tenderness.     left to right superpubic pain into the lower quadrants   Genitourinary:   Genitourinary Comments: Generous amount of tissue , could see beginning of cervix could not visualize os, no brown os, unable to palpate cervix due to depth,    Musculoskeletal: Normal range of motion.   No cervical, low thoracic and upper lumbar paraspinal tenderness,     Good sensation in extremities excepting tingling in the right index finger andright middle finger    She has neuropathic type pain in the lower legs,  1 + trace edema going to mid shin, mild tendernes on the calf in the left but no size difference    Lymphadenopathy:     She has no cervical adenopathy.   Neurological: She is alert and oriented to person, place, and time.   Skin: No rash noted. No  erythema.   Psychiatric: She has a normal mood and affect.         ED Course   Procedures  Labs Reviewed   COMPREHENSIVE METABOLIC PANEL - Abnormal; Notable for the following:        Result Value    Glucose 281 (*)     BUN, Bld 27 (*)     Creatinine 1.9 (*)     eGFR if  31 (*)     eGFR if non  27 (*)     All other components within normal limits   URINALYSIS - Abnormal; Notable for the following:     Protein, UA 1+ (*)     Glucose, UA 3+ (*)     Occult Blood UA 3+ (*)     All other components within normal limits   URINALYSIS MICROSCOPIC - Abnormal; Notable for the following:     RBC, UA 10 (*)     All other components within normal limits   CBC W/ AUTO DIFFERENTIAL   LACTIC ACID, PLASMA   MAGNESIUM   LIPASE   TSH   TYPE & SCREEN             Medical Decision Making:   Initial Assessment:   72 Y/O female presents to the ED with a complaint of vaginal bleeding and multiple other complaints. Will order pain medicine, fluids, and labs.      Differential Diagnosis:   Uncontrolled diabetes, possible pelvic cancers, dysfunctional uterine, gallbladder disease, pancrease issue, uncontrolled hypertension  ED Management:  8:36 PM pt has not seen a family doctor for months du to being Mohawk speaking. She has not been compliant with ehr insulin, elevated BS today in the ED  8:41 PM - pt's BP elevate will give 10mg labetalol IV    She will be discharged on Norco, Colace, and Gabapentin.              Attending Attestation:           Physician Attestation for Scribe:      Comments: I, Dr. Garrison Ochoa, personally performed the services described in this documentation. All medical record entries made by the scribe were at my direction and in my presence.  I have reviewed the chart and agree that the record reflects my personal performance and is accurate and complete. Garrison Ochoa MD.  3:35 AM 11/12/2017      Attending ED Notes:   Patient with hx of dm, htn, and neuropathy - bib daughter for concern  of vaginal bleeding  For about a week but much worse today.     No lightheadedness, no sob, no weakness,  -     Plan work up including pelvic exam,     no anemia, no active bleeding noted   Patient has not had any MD contact for 5 months - would benefit from PCP care and Gyn care, Daughter will help set up appointments and transportation.             ED Course      Clinical Impression:     1. Dysfunctional uterine bleeding    2. Pelvic pain    3. Hyperglycemia    4. Hypertension, unspecified type    5. Neuropathy          Disposition:   Disposition: Discharged  Condition: Stable                        Garrison Ochoa MD  11/12/17 0346       Garrison Ochoa MD  11/12/17 0347

## 2017-11-12 NOTE — ED NOTES
Report received, pt care assumed. Pt lying on stretcher, family at bedside, NAD, VSS. All lines and tubes patent and secured. Pt and family updated on plan of care. Will continue to monitor.

## 2017-11-12 NOTE — ED NOTES
Pt presents to ED via EMS with c/o lower abdominal cramping, vaginal bleeding and dizziness x 2 days. Also c/o nausea and vomiting x 3 episodes over last few days. Pt Azeri speaking only. NAD noted. Denies diarrhea. Rates pain 7/10 at present. NAD noted. Will continue to monitor.

## 2018-06-07 ENCOUNTER — HOSPITAL ENCOUNTER (OUTPATIENT)
Facility: HOSPITAL | Age: 73
LOS: 1 days | End: 2018-06-10
Attending: EMERGENCY MEDICINE | Admitting: HOSPITALIST
Payer: MEDICARE

## 2018-06-07 DIAGNOSIS — I50.9 CHF (CONGESTIVE HEART FAILURE): ICD-10-CM

## 2018-06-07 DIAGNOSIS — R06.02 SOB (SHORTNESS OF BREATH): ICD-10-CM

## 2018-06-07 DIAGNOSIS — I63.9 STROKE: ICD-10-CM

## 2018-06-07 PROBLEM — N93.8 DUB (DYSFUNCTIONAL UTERINE BLEEDING): Status: ACTIVE | Noted: 2018-06-07

## 2018-06-07 PROBLEM — J45.901 ACUTE ASTHMA EXACERBATION: Status: ACTIVE | Noted: 2018-06-07

## 2018-06-07 PROBLEM — N93.8 DUB (DYSFUNCTIONAL UTERINE BLEEDING): Status: RESOLVED | Noted: 2018-06-07 | Resolved: 2018-06-07

## 2018-06-07 PROBLEM — E11.9 TYPE 2 DIABETES MELLITUS, WITH LONG-TERM CURRENT USE OF INSULIN: Status: ACTIVE | Noted: 2018-06-07

## 2018-06-07 PROBLEM — J81.1 PULMONARY EDEMA: Status: ACTIVE | Noted: 2018-06-07

## 2018-06-07 PROBLEM — Z79.4 TYPE 2 DIABETES MELLITUS, WITH LONG-TERM CURRENT USE OF INSULIN: Status: ACTIVE | Noted: 2018-06-07

## 2018-06-07 PROBLEM — I10 ESSENTIAL HYPERTENSION: Status: ACTIVE | Noted: 2018-06-07

## 2018-06-07 LAB
ALBUMIN SERPL BCP-MCNC: 3.1 G/DL
ALP SERPL-CCNC: 103 U/L
ALT SERPL W/O P-5'-P-CCNC: 21 U/L
ANION GAP SERPL CALC-SCNC: 11 MMOL/L
APTT BLDCRRT: 28.1 SEC
AST SERPL-CCNC: 18 U/L
BACTERIA #/AREA URNS HPF: ABNORMAL /HPF
BASOPHILS # BLD AUTO: 0.01 K/UL
BASOPHILS NFR BLD: 0.1 %
BILIRUB SERPL-MCNC: 0.9 MG/DL
BILIRUB UR QL STRIP: NEGATIVE
BNP SERPL-MCNC: 837 PG/ML
BUN SERPL-MCNC: 20 MG/DL
CALCIUM SERPL-MCNC: 9.5 MG/DL
CHLORIDE SERPL-SCNC: 102 MMOL/L
CLARITY UR: CLEAR
CO2 SERPL-SCNC: 26 MMOL/L
COLOR UR: YELLOW
CREAT SERPL-MCNC: 1.5 MG/DL
DIFFERENTIAL METHOD: ABNORMAL
EOSINOPHIL # BLD AUTO: 0.1 K/UL
EOSINOPHIL NFR BLD: 0.7 %
ERYTHROCYTE [DISTWIDTH] IN BLOOD BY AUTOMATED COUNT: 13.6 %
EST. GFR  (AFRICAN AMERICAN): 40 ML/MIN/1.73 M^2
EST. GFR  (NON AFRICAN AMERICAN): 35 ML/MIN/1.73 M^2
ESTIMATED PA SYSTOLIC PRESSURE: 26.81
GLUCOSE SERPL-MCNC: 273 MG/DL
GLUCOSE SERPL-MCNC: 680 MG/DL
GLUCOSE SERPL-MCNC: >500 MG/DL (ref 70–110)
GLUCOSE SERPL-MCNC: >500 MG/DL (ref 70–110)
GLUCOSE UR QL STRIP: ABNORMAL
HCT VFR BLD AUTO: 38.6 %
HGB BLD-MCNC: 11.5 G/DL
HGB UR QL STRIP: ABNORMAL
KETONES UR QL STRIP: NEGATIVE
LEUKOCYTE ESTERASE UR QL STRIP: NEGATIVE
LYMPHOCYTES # BLD AUTO: 1.4 K/UL
LYMPHOCYTES NFR BLD: 13.9 %
MCH RBC QN AUTO: 28 PG
MCHC RBC AUTO-ENTMCNC: 29.8 G/DL
MCV RBC AUTO: 94 FL
MICROSCOPIC COMMENT: ABNORMAL
MITRAL VALVE MOBILITY: NORMAL
MONOCYTES # BLD AUTO: 0.7 K/UL
MONOCYTES NFR BLD: 7.1 %
NEUTROPHILS # BLD AUTO: 7.6 K/UL
NEUTROPHILS NFR BLD: 77.8 %
NITRITE UR QL STRIP: NEGATIVE
PH UR STRIP: 6 [PH] (ref 5–8)
PLATELET # BLD AUTO: 194 K/UL
PMV BLD AUTO: 11.2 FL
POTASSIUM SERPL-SCNC: 4.1 MMOL/L
PROT SERPL-MCNC: 7.2 G/DL
PROT UR QL STRIP: NEGATIVE
RBC # BLD AUTO: 4.11 M/UL
RBC #/AREA URNS HPF: 0 /HPF (ref 0–4)
RETIRED EF AND QEF - SEE NOTES: 55 (ref 55–65)
SODIUM SERPL-SCNC: 139 MMOL/L
SP GR UR STRIP: <=1.005 (ref 1–1.03)
SQUAMOUS #/AREA URNS HPF: 2 /HPF
TRICUSPID VALVE REGURGITATION: NORMAL
TROPONIN I SERPL DL<=0.01 NG/ML-MCNC: 0.01 NG/ML
TROPONIN I SERPL DL<=0.01 NG/ML-MCNC: 0.02 NG/ML
TROPONIN I SERPL DL<=0.01 NG/ML-MCNC: 0.02 NG/ML
URN SPEC COLLECT METH UR: ABNORMAL
UROBILINOGEN UR STRIP-ACNC: NEGATIVE EU/DL
WBC # BLD AUTO: 9.71 K/UL
WBC #/AREA URNS HPF: 1 /HPF (ref 0–5)
YEAST URNS QL MICRO: ABNORMAL

## 2018-06-07 PROCEDURE — 25000242 PHARM REV CODE 250 ALT 637 W/ HCPCS: Performed by: HOSPITALIST

## 2018-06-07 PROCEDURE — 81000 URINALYSIS NONAUTO W/SCOPE: CPT

## 2018-06-07 PROCEDURE — 87086 URINE CULTURE/COLONY COUNT: CPT

## 2018-06-07 PROCEDURE — 85025 COMPLETE CBC W/AUTO DIFF WBC: CPT

## 2018-06-07 PROCEDURE — G0378 HOSPITAL OBSERVATION PER HR: HCPCS

## 2018-06-07 PROCEDURE — 93306 TTE W/DOPPLER COMPLETE: CPT | Mod: 26,,, | Performed by: INTERNAL MEDICINE

## 2018-06-07 PROCEDURE — 84484 ASSAY OF TROPONIN QUANT: CPT | Mod: 91

## 2018-06-07 PROCEDURE — 85730 THROMBOPLASTIN TIME PARTIAL: CPT

## 2018-06-07 PROCEDURE — 63600175 PHARM REV CODE 636 W HCPCS: Performed by: HOSPITALIST

## 2018-06-07 PROCEDURE — 25000003 PHARM REV CODE 250: Performed by: PHYSICIAN ASSISTANT

## 2018-06-07 PROCEDURE — 63600175 PHARM REV CODE 636 W HCPCS: Performed by: PHYSICIAN ASSISTANT

## 2018-06-07 PROCEDURE — 36415 COLL VENOUS BLD VENIPUNCTURE: CPT

## 2018-06-07 PROCEDURE — 80053 COMPREHEN METABOLIC PANEL: CPT

## 2018-06-07 PROCEDURE — 84484 ASSAY OF TROPONIN QUANT: CPT

## 2018-06-07 PROCEDURE — 87077 CULTURE AEROBIC IDENTIFY: CPT

## 2018-06-07 PROCEDURE — 87088 URINE BACTERIA CULTURE: CPT

## 2018-06-07 PROCEDURE — 93005 ELECTROCARDIOGRAM TRACING: CPT

## 2018-06-07 PROCEDURE — 87186 SC STD MICRODIL/AGAR DIL: CPT

## 2018-06-07 PROCEDURE — 93010 ELECTROCARDIOGRAM REPORT: CPT | Mod: ,,, | Performed by: INTERNAL MEDICINE

## 2018-06-07 PROCEDURE — 63600175 PHARM REV CODE 636 W HCPCS: Performed by: EMERGENCY MEDICINE

## 2018-06-07 PROCEDURE — 94640 AIRWAY INHALATION TREATMENT: CPT

## 2018-06-07 PROCEDURE — 82947 ASSAY GLUCOSE BLOOD QUANT: CPT | Mod: 91

## 2018-06-07 PROCEDURE — 99285 EMERGENCY DEPT VISIT HI MDM: CPT | Mod: 25

## 2018-06-07 PROCEDURE — 83880 ASSAY OF NATRIURETIC PEPTIDE: CPT

## 2018-06-07 PROCEDURE — 25000242 PHARM REV CODE 250 ALT 637 W/ HCPCS: Performed by: EMERGENCY MEDICINE

## 2018-06-07 PROCEDURE — 93306 TTE W/DOPPLER COMPLETE: CPT

## 2018-06-07 PROCEDURE — 25000003 PHARM REV CODE 250: Performed by: HOSPITALIST

## 2018-06-07 PROCEDURE — 96374 THER/PROPH/DIAG INJ IV PUSH: CPT

## 2018-06-07 PROCEDURE — 63600175 PHARM REV CODE 636 W HCPCS: Performed by: NURSE PRACTITIONER

## 2018-06-07 PROCEDURE — 96375 TX/PRO/DX INJ NEW DRUG ADDON: CPT

## 2018-06-07 RX ORDER — IPRATROPIUM BROMIDE AND ALBUTEROL SULFATE 2.5; .5 MG/3ML; MG/3ML
3 SOLUTION RESPIRATORY (INHALATION)
Status: COMPLETED | OUTPATIENT
Start: 2018-06-07 | End: 2018-06-07

## 2018-06-07 RX ORDER — IBUPROFEN 200 MG
16 TABLET ORAL
Status: DISCONTINUED | OUTPATIENT
Start: 2018-06-07 | End: 2018-06-10 | Stop reason: HOSPADM

## 2018-06-07 RX ORDER — PREDNISONE 20 MG/1
40 TABLET ORAL DAILY
Status: DISCONTINUED | OUTPATIENT
Start: 2018-06-08 | End: 2018-06-10

## 2018-06-07 RX ORDER — IPRATROPIUM BROMIDE AND ALBUTEROL SULFATE 2.5; .5 MG/3ML; MG/3ML
3 SOLUTION RESPIRATORY (INHALATION) EVERY 4 HOURS
Status: DISCONTINUED | OUTPATIENT
Start: 2018-06-07 | End: 2018-06-10 | Stop reason: HOSPADM

## 2018-06-07 RX ORDER — SODIUM CHLORIDE 0.9 % (FLUSH) 0.9 %
5 SYRINGE (ML) INJECTION
Status: DISCONTINUED | OUTPATIENT
Start: 2018-06-07 | End: 2018-06-10 | Stop reason: HOSPADM

## 2018-06-07 RX ORDER — METHYLPREDNISOLONE SOD SUCC 125 MG
125 VIAL (EA) INJECTION
Status: COMPLETED | OUTPATIENT
Start: 2018-06-07 | End: 2018-06-07

## 2018-06-07 RX ORDER — ONDANSETRON 8 MG/1
8 TABLET, ORALLY DISINTEGRATING ORAL EVERY 8 HOURS PRN
Status: DISCONTINUED | OUTPATIENT
Start: 2018-06-07 | End: 2018-06-10 | Stop reason: HOSPADM

## 2018-06-07 RX ORDER — BENZONATATE 100 MG/1
100 CAPSULE ORAL 3 TIMES DAILY PRN
Status: DISCONTINUED | OUTPATIENT
Start: 2018-06-07 | End: 2018-06-07

## 2018-06-07 RX ORDER — GABAPENTIN 300 MG/1
300 CAPSULE ORAL NIGHTLY
Status: DISCONTINUED | OUTPATIENT
Start: 2018-06-07 | End: 2018-06-09

## 2018-06-07 RX ORDER — ENOXAPARIN SODIUM 100 MG/ML
40 INJECTION SUBCUTANEOUS EVERY 24 HOURS
Status: DISCONTINUED | OUTPATIENT
Start: 2018-06-07 | End: 2018-06-08 | Stop reason: DRUGHIGH

## 2018-06-07 RX ORDER — FUROSEMIDE 10 MG/ML
40 INJECTION INTRAMUSCULAR; INTRAVENOUS
Status: COMPLETED | OUTPATIENT
Start: 2018-06-07 | End: 2018-06-07

## 2018-06-07 RX ORDER — ACETAMINOPHEN 325 MG/1
650 TABLET ORAL EVERY 4 HOURS PRN
Status: DISCONTINUED | OUTPATIENT
Start: 2018-06-07 | End: 2018-06-10 | Stop reason: HOSPADM

## 2018-06-07 RX ORDER — INSULIN ASPART 100 [IU]/ML
1-10 INJECTION, SOLUTION INTRAVENOUS; SUBCUTANEOUS
Status: DISCONTINUED | OUTPATIENT
Start: 2018-06-07 | End: 2018-06-10 | Stop reason: HOSPADM

## 2018-06-07 RX ORDER — HYDRALAZINE HYDROCHLORIDE 20 MG/ML
10 INJECTION INTRAMUSCULAR; INTRAVENOUS EVERY 6 HOURS PRN
Status: DISCONTINUED | OUTPATIENT
Start: 2018-06-07 | End: 2018-06-09

## 2018-06-07 RX ORDER — INSULIN ASPART 100 [IU]/ML
0-5 INJECTION, SOLUTION INTRAVENOUS; SUBCUTANEOUS
Status: DISCONTINUED | OUTPATIENT
Start: 2018-06-07 | End: 2018-06-07

## 2018-06-07 RX ORDER — IBUPROFEN 200 MG
24 TABLET ORAL
Status: DISCONTINUED | OUTPATIENT
Start: 2018-06-07 | End: 2018-06-10 | Stop reason: HOSPADM

## 2018-06-07 RX ORDER — FUROSEMIDE 10 MG/ML
20 INJECTION INTRAMUSCULAR; INTRAVENOUS DAILY
Status: DISCONTINUED | OUTPATIENT
Start: 2018-06-08 | End: 2018-06-07

## 2018-06-07 RX ORDER — FUROSEMIDE 10 MG/ML
40 INJECTION INTRAMUSCULAR; INTRAVENOUS DAILY
Status: DISCONTINUED | OUTPATIENT
Start: 2018-06-08 | End: 2018-06-08

## 2018-06-07 RX ORDER — GLUCAGON 1 MG
1 KIT INJECTION
Status: DISCONTINUED | OUTPATIENT
Start: 2018-06-07 | End: 2018-06-10 | Stop reason: HOSPADM

## 2018-06-07 RX ORDER — BENZONATATE 100 MG/1
100 CAPSULE ORAL 3 TIMES DAILY PRN
Status: DISCONTINUED | OUTPATIENT
Start: 2018-06-07 | End: 2018-06-10 | Stop reason: HOSPADM

## 2018-06-07 RX ORDER — CARVEDILOL 12.5 MG/1
12.5 TABLET ORAL 2 TIMES DAILY WITH MEALS
Status: DISCONTINUED | OUTPATIENT
Start: 2018-06-07 | End: 2018-06-08

## 2018-06-07 RX ORDER — HYDROCODONE BITARTRATE AND ACETAMINOPHEN 5; 325 MG/1; MG/1
1 TABLET ORAL EVERY 4 HOURS PRN
Status: DISCONTINUED | OUTPATIENT
Start: 2018-06-07 | End: 2018-06-10 | Stop reason: HOSPADM

## 2018-06-07 RX ADMIN — IPRATROPIUM BROMIDE AND ALBUTEROL SULFATE 3 ML: .5; 3 SOLUTION RESPIRATORY (INHALATION) at 04:06

## 2018-06-07 RX ADMIN — IPRATROPIUM BROMIDE AND ALBUTEROL SULFATE 3 ML: .5; 3 SOLUTION RESPIRATORY (INHALATION) at 07:06

## 2018-06-07 RX ADMIN — METHYLPREDNISOLONE SODIUM SUCCINATE 125 MG: 125 INJECTION, POWDER, FOR SOLUTION INTRAMUSCULAR; INTRAVENOUS at 07:06

## 2018-06-07 RX ADMIN — ENOXAPARIN SODIUM 40 MG: 100 INJECTION SUBCUTANEOUS at 04:06

## 2018-06-07 RX ADMIN — INSULIN DETEMIR 10 UNITS: 100 INJECTION, SOLUTION SUBCUTANEOUS at 04:06

## 2018-06-07 RX ADMIN — IPRATROPIUM BROMIDE AND ALBUTEROL SULFATE 3 ML: .5; 3 SOLUTION RESPIRATORY (INHALATION) at 09:06

## 2018-06-07 RX ADMIN — FUROSEMIDE 40 MG: 10 INJECTION, SOLUTION INTRAMUSCULAR; INTRAVENOUS at 08:06

## 2018-06-07 RX ADMIN — INSULIN HUMAN 10 UNITS: 100 INJECTION, SOLUTION PARENTERAL at 10:06

## 2018-06-07 RX ADMIN — CARVEDILOL 12.5 MG: 12.5 TABLET, FILM COATED ORAL at 04:06

## 2018-06-07 RX ADMIN — GUAIFENESIN AND DEXTROMETHORPHAN HYDROBROMIDE 1 TABLET: 600; 30 TABLET, EXTENDED RELEASE ORAL at 08:06

## 2018-06-07 RX ADMIN — IPRATROPIUM BROMIDE AND ALBUTEROL SULFATE 3 ML: .5; 3 SOLUTION RESPIRATORY (INHALATION) at 12:06

## 2018-06-07 RX ADMIN — HYDROCODONE BITARTRATE AND ACETAMINOPHEN 1 TABLET: 5; 325 TABLET ORAL at 08:06

## 2018-06-07 RX ADMIN — INSULIN ASPART 5 UNITS: 100 INJECTION, SOLUTION INTRAVENOUS; SUBCUTANEOUS at 09:06

## 2018-06-07 RX ADMIN — GABAPENTIN 300 MG: 300 CAPSULE ORAL at 08:06

## 2018-06-07 RX ADMIN — BENZONATATE 100 MG: 100 CAPSULE ORAL at 09:06

## 2018-06-07 RX ADMIN — HYDRALAZINE HYDROCHLORIDE 10 MG: 20 INJECTION INTRAMUSCULAR; INTRAVENOUS at 10:06

## 2018-06-07 RX ADMIN — ONDANSETRON 8 MG: 8 TABLET, ORALLY DISINTEGRATING ORAL at 04:06

## 2018-06-07 NOTE — ASSESSMENT & PLAN NOTE
CXR showing bilateral interstitial infiltrates diffusely involving lungs w new limited pulmonary vascular congestion. Pt with cardiac history and s/p pacemaker. Echo showing normal EF, indeterminate LV diastolic dysfunction, and PA 27. No edema visible on exam.   Lasix 40 mg daily. Continue home carvedilol. Supplemental oxygen.

## 2018-06-07 NOTE — NURSING
Lucy Felix LPN(Korean speaking) at bedside to translate and assist with admission history. Patient states family will bring advanced directive and home mediations to be reviewed tonight.

## 2018-06-07 NOTE — ED NOTES
Patient identifiers for Franklin Christian checked and correct.  LOC: The patient is awake, alert and aware of environment with an appropriate affect, the patient is oriented x 3 and speaking appropriately.  APPEARANCE: Obese. Patient uncomfortable and in no acute distress.  SKIN: The skin is warm and dry.  MUSKULOSKELETAL: Patient moving all extremities well, no obvious swelling or deformities noted.  RESPIRATORY: Labored.  CARDIAC: Patient has a normal rate and rhythm, no periphreal edema noted, capillary refill < 3 seconds.  ABDOMEN: Soft and non tender to palpation.

## 2018-06-07 NOTE — ASSESSMENT & PLAN NOTE
SOB (shortness of breath)  Pt with one week history of SOB and productive cough. Former smoker. SOB also likely has component of acute HF. Tight wheezing on exam.  Continue scheduled duonebs, prednisone 40. Supplemental oxygen. May need home oxygen.

## 2018-06-07 NOTE — ED PROVIDER NOTES
Encounter Date: 6/7/2018    SCRIBE #1 NOTE: I, Elliot Daley, am scribing for, and in the presence of,  Dr. Matute. I have scribed the entire note.       History     Chief Complaint   Patient presents with    Shortness of Breath     Franklin Christian is a 72 y.o. female who  has a past medical history of Diabetes mellitus; Hypertension; MI, old; and Renal disorder.    The patient presents to the ED due to SOB.    She reports onset was a few days ago but worse today. Pt reports used her inhaler all night without much relief. She admits to decreased appetite, phlegm production, back and neck pain. Her pacemaker was placed 10 years ago in [place]. She has a Cardiologist at [facility]. She has been to the hospital 3 times this month for different issues. On her last hospital visit she was given medications for her nausea and vomiting.        The history is provided by a relative and the patient.     Review of patient's allergies indicates:  No Known Allergies  Past Medical History:   Diagnosis Date    Diabetes mellitus     Hypertension     MI, old     Renal disorder      Past Surgical History:   Procedure Laterality Date    CARDIAC PACEMAKER PLACEMENT      CARDIAC PACEMAKER PLACEMENT      HYSTERECTOMY      SHOULDER SURGERY       History reviewed. No pertinent family history.  Social History   Substance Use Topics    Smoking status: Former Smoker    Smokeless tobacco: Never Used    Alcohol use No     Review of Systems   Constitutional: Positive for appetite change (decreased appetite).   Respiratory: Positive for cough and shortness of breath.         Phlegm production.   Musculoskeletal: Positive for back pain and neck pain.   All other systems reviewed and are negative.      Physical Exam     Initial Vitals [06/07/18 0621]   BP Pulse Resp Temp SpO2   (!) 142/85 92 (!) 22 98.8 °F (37.1 °C) 100 %      MAP       104         Physical Exam    Nursing note and vitals reviewed.  Constitutional: She appears well-developed and  well-nourished. She is not diaphoretic. No distress.   Morbidly obese.   HENT:   Head: Normocephalic and atraumatic.   Mouth/Throat: Oropharynx is clear and moist.   Eyes: Conjunctivae and EOM are normal.   Neck: Normal range of motion. Neck supple.   Cardiovascular: Normal rate, regular rhythm, normal heart sounds and intact distal pulses. Exam reveals no gallop and no friction rub.    No murmur heard.  Pacemaker   Pulmonary/Chest: She is in respiratory distress. She has wheezes (expiratory).   Course breath sounds throughout.    Abdominal: Soft. She exhibits no distension.   Musculoskeletal: Normal range of motion. She exhibits no edema or tenderness.   Neurological: She is alert and oriented to person, place, and time. She has normal strength.   Skin: Skin is warm and dry.   Psychiatric: She has a normal mood and affect. Thought content normal.         ED Course   Procedures  Labs Reviewed   CBC W/ AUTO DIFFERENTIAL - Abnormal; Notable for the following:        Result Value    Hemoglobin 11.5 (*)     MCHC 29.8 (*)     Gran% 77.8 (*)     Lymph% 13.9 (*)     All other components within normal limits   COMPREHENSIVE METABOLIC PANEL - Abnormal; Notable for the following:     Glucose 273 (*)     Creatinine 1.5 (*)     Albumin 3.1 (*)     eGFR if  40 (*)     eGFR if non  35 (*)     All other components within normal limits   B-TYPE NATRIURETIC PEPTIDE - Abnormal; Notable for the following:      (*)     All other components within normal limits   TROPONIN I   APTT     EKG Readings: (Independently Interpreted)   7:43 AM EKG: Paced rhythm. Rate: 88 bpm. No STEMI.         X-Ray Chest 1 View   Final Result      New cardiac decompensation.         Electronically signed by: Justin Kitchen MD   Date:    06/07/2018   Time:    08:17           Medical Decision Making:   Clinical Tests:   Lab Tests: Ordered and Reviewed  Radiological Study: Ordered and Reviewed  Medical Tests: Ordered and  Reviewed  ED Management:  8:50 AM Case discussed with Internist, Dr. Finch will admit patient.                      Clinical Impression:     1. SOB (shortness of breath)    2. CHF (congestive heart failure)                                   Samir Matute MD  06/08/18 8393

## 2018-06-07 NOTE — H&P
[facility] Hospital Medicine  History & Physical    Patient Name: Franklin Christian  MRN: [id number]  Admission Date: 6/7/2018  Attending Physician: Chase Finch MD   Primary Care Provider: Primary Doctor No         Patient information was obtained from patient, relative(s), caregiver / friend and ER records.     Subjective:     Principal Problem:Acute asthma exacerbation    Chief Complaint:   Chief Complaint   Patient presents with    Shortness of Breath        HPI: Ms. Christian is a 73 yo  female with COPD, DM 2, HTN, hx MI, and CKD 3 who presents today with SOB. She reports she has been having difficulty breathing for one week, accompanied by abdominal pain, chest pain, and productive cough. She reports burning with urination as well. The morning of 6/7 she awoke with severe SOB that was not relieved with her inhaler. She presented to the ED and was found to have a BNP of 873 and pulmonary vascular congestion on CXR. She was given 40 mg IV lasix, duonebs, and solumedrol, and placed on supplemental oxygen. She was admitted to hospital medicine.   Other notable complaint includes 1 month of vaginal bleeding. She has a gynecologist appt on June 20. She reports that she previously had uterine cancer of some sort.   Ms. Christian lives at home. She has oxygen at home but it belongs to someone else. She uses a CPAP.     Past Medical History:   Diagnosis Date    Diabetes mellitus     Hypertension     MI, old     Renal disorder        Past Surgical History:   Procedure Laterality Date    CARDIAC PACEMAKER PLACEMENT      CARDIAC PACEMAKER PLACEMENT      HYSTERECTOMY      SHOULDER SURGERY         Review of patient's allergies indicates:  No Known Allergies    No current facility-administered medications on file prior to encounter.      Current Outpatient Prescriptions on File Prior to Encounter   Medication Sig    albuterol 90 mcg/actuation inhaler Inhale 1-2 puffs into the lungs every 6  (six) hours as needed for Wheezing. Rescue    carvedilol (COREG) 12.5 MG tablet Take 1 tablet (12.5 mg total) by mouth 2 (two) times daily with meals.    gabapentin (NEURONTIN) 300 MG capsule Take 1 capsule (300 mg total) by mouth every evening.    hydrocodone-acetaminophen 5-325mg (NORCO) 5-325 mg per tablet Take 1 tablet by mouth every 6 (six) hours as needed for Pain.    insulin glargine (LANTUS) 100 unit/mL injection Inject 10 Units into the skin once daily.    methylPREDNISolone (MEDROL DOSEPACK) 4 mg tablet use as directed     Family History     None        Social History Main Topics    Smoking status: Former Smoker    Smokeless tobacco: Never Used    Alcohol use No    Drug use: No    Sexual activity: Not on file     Review of Systems   Unable to perform ROS: Other (Limited by language barrier)   Constitutional: Positive for appetite change.   HENT: Positive for congestion and sore throat.    Respiratory: Positive for cough and shortness of breath.    Cardiovascular: Positive for chest pain. Negative for leg swelling.   Gastrointestinal: Positive for abdominal pain. Negative for nausea and vomiting.   Genitourinary: Positive for dysuria, flank pain and vaginal bleeding.   Musculoskeletal: Positive for back pain.   Neurological: Negative for syncope and weakness.   Hematological: Does not bruise/bleed easily.   Psychiatric/Behavioral: Negative for behavioral problems and confusion.     Objective:     Vital Signs (Most Recent):  Temp: 98.2 °F (36.8 °C) (06/07/18 1037)  Pulse: 86 (06/07/18 1221)  Resp: 18 (06/07/18 1221)  BP: 134/61 (06/07/18 1037)  SpO2: (!) 93 % (06/07/18 1221) Vital Signs (24h Range):  Temp:  [98.2 °F (36.8 °C)-98.8 °F (37.1 °C)] 98.2 °F (36.8 °C)  Pulse:  [70-97] 86  Resp:  [18-28] 18  SpO2:  [93 %-100 %] 93 %  BP: (128-142)/(38-85) 134/61     Weight: 108.5 kg (239 lb 3.2 oz)  Body mass index is 43.75 kg/m².    Physical Exam   Constitutional: She is oriented to person, place, and  time.   HENT:   Mouth/Throat: Oropharynx is clear and moist.   Eyes: EOM are normal.   Cardiovascular: Normal rate and regular rhythm.    Pulmonary/Chest: She has wheezes.   Abdominal: Soft. There is tenderness (epigastric and suprapubic).   Musculoskeletal: She exhibits no edema.   Neurological: She is alert and oriented to person, place, and time.   Skin: Skin is warm and dry.         CRANIAL NERVES     CN III, IV, VI   Extraocular motions are normal.        Significant Labs:   BMP:   Recent Labs  Lab 06/07/18  0733   *      K 4.1      CO2 26   BUN 20   CREATININE 1.5*   CALCIUM 9.5     CBC:   Recent Labs  Lab 06/07/18 0733   WBC 9.71   HGB 11.5*   HCT 38.6        Cardiac Markers:   Recent Labs  Lab 06/07/18  0733   *     Coagulation:   Recent Labs  Lab 06/07/18  0733   APTT 28.1     Troponin:   Recent Labs  Lab 06/07/18 0733 06/07/18  1156   TROPONINI 0.020 0.021       Significant Imaging:     CXR  Heart normal, status post left subclavian pacemaker therapy.  New bilateral interstitial infiltrates diffusely involving lungs with new limited pulmonary vascular congestion.  No new consolidation pleural reaction.    Assessment/Plan:     * Acute asthma exacerbation    SOB (shortness of breath)  Pt with one week history of SOB and productive cough. Former smoker. SOB also likely has component of acute HF. Tight wheezing on exam.  Continue scheduled duonebs, prednisone 40. Supplemental oxygen. May need home oxygen.         Pulmonary edema    CXR showing bilateral interstitial infiltrates diffusely involving lungs w new limited pulmonary vascular congestion. Pt with cardiac history and s/p pacemaker. Echo showing normal EF, indeterminate LV diastolic dysfunction, and PA 27. No edema visible on exam.   Lasix 40 mg daily. Continue home carvedilol. Supplemental oxygen.         Type 2 diabetes mellitus, with long-term current use of insulin    . No recent A1C.   Check blood glucose AC  and HS and use SSI.  Check A1c.  Diabetic diet. Continue home insulin 10 units daily. Monitor closely while on steroids.        Essential hypertension    Continue home carvedilol 12.5 mg BID.           VTE Risk Mitigation         Ordered     enoxaparin injection 40 mg  Daily      06/07/18 1037     IP VTE HIGH RISK PATIENT  Once      06/07/18 1037             Carissa Barney PA-C  Department of Hospital Medicine   [facility]

## 2018-06-07 NOTE — PLAN OF CARE
Problem: Patient Care Overview  Goal: Plan of Care Review  Outcome: Ongoing (interventions implemented as appropriate)  Plan of care reviewed with patient and family. Voice understanding. NSR on monitor with no red alarms noted. 2 L nc maintained secondary to SOB on exertion. No acute distress noted at this time. Side rails x3, bed low, call bell within reach. Maintain bed alarm for patient safety. Patient will be monitored overnight.

## 2018-06-07 NOTE — SUBJECTIVE & OBJECTIVE
Past Medical History:   Diagnosis Date    Diabetes mellitus     Hypertension     MI, old     Renal disorder        Past Surgical History:   Procedure Laterality Date    CARDIAC PACEMAKER PLACEMENT      CARDIAC PACEMAKER PLACEMENT      HYSTERECTOMY      SHOULDER SURGERY         Review of patient's allergies indicates:  No Known Allergies    No current facility-administered medications on file prior to encounter.      Current Outpatient Prescriptions on File Prior to Encounter   Medication Sig    albuterol 90 mcg/actuation inhaler Inhale 1-2 puffs into the lungs every 6 (six) hours as needed for Wheezing. Rescue    carvedilol (COREG) 12.5 MG tablet Take 1 tablet (12.5 mg total) by mouth 2 (two) times daily with meals.    gabapentin (NEURONTIN) 300 MG capsule Take 1 capsule (300 mg total) by mouth every evening.    hydrocodone-acetaminophen 5-325mg (NORCO) 5-325 mg per tablet Take 1 tablet by mouth every 6 (six) hours as needed for Pain.    insulin glargine (LANTUS) 100 unit/mL injection Inject 10 Units into the skin once daily.    methylPREDNISolone (MEDROL DOSEPACK) 4 mg tablet use as directed     Family History     None        Social History Main Topics    Smoking status: Former Smoker    Smokeless tobacco: Never Used    Alcohol use No    Drug use: No    Sexual activity: Not on file     Review of Systems   Unable to perform ROS: Other (Limited by language barrier)   Constitutional: Positive for appetite change.   HENT: Positive for congestion and sore throat.    Respiratory: Positive for cough and shortness of breath.    Cardiovascular: Positive for chest pain. Negative for leg swelling.   Gastrointestinal: Positive for abdominal pain. Negative for nausea and vomiting.   Genitourinary: Positive for dysuria, flank pain and vaginal bleeding.   Musculoskeletal: Positive for back pain.   Neurological: Negative for syncope and weakness.   Hematological: Does not bruise/bleed easily.    Psychiatric/Behavioral: Negative for behavioral problems and confusion.     Objective:     Vital Signs (Most Recent):  Temp: 98.2 °F (36.8 °C) (06/07/18 1037)  Pulse: 86 (06/07/18 1221)  Resp: 18 (06/07/18 1221)  BP: 134/61 (06/07/18 1037)  SpO2: (!) 93 % (06/07/18 1221) Vital Signs (24h Range):  Temp:  [98.2 °F (36.8 °C)-98.8 °F (37.1 °C)] 98.2 °F (36.8 °C)  Pulse:  [70-97] 86  Resp:  [18-28] 18  SpO2:  [93 %-100 %] 93 %  BP: (128-142)/(38-85) 134/61     Weight: 108.5 kg (239 lb 3.2 oz)  Body mass index is 43.75 kg/m².    Physical Exam   Constitutional: She is oriented to person, place, and time.   HENT:   Mouth/Throat: Oropharynx is clear and moist.   Eyes: EOM are normal.   Cardiovascular: Normal rate and regular rhythm.    Pulmonary/Chest: She has wheezes.   Abdominal: Soft. There is tenderness (epigastric and suprapubic).   Musculoskeletal: She exhibits no edema.   Neurological: She is alert and oriented to person, place, and time.   Skin: Skin is warm and dry.         CRANIAL NERVES     CN III, IV, VI   Extraocular motions are normal.        Significant Labs:   BMP:   Recent Labs  Lab 06/07/18  0733   *      K 4.1      CO2 26   BUN 20   CREATININE 1.5*   CALCIUM 9.5     CBC:   Recent Labs  Lab 06/07/18  0733   WBC 9.71   HGB 11.5*   HCT 38.6        Cardiac Markers:   Recent Labs  Lab 06/07/18  0733   *     Coagulation:   Recent Labs  Lab 06/07/18  0733   APTT 28.1     Troponin:   Recent Labs  Lab 06/07/18  0733 06/07/18  1156   TROPONINI 0.020 0.021       Significant Imaging:     CXR  Heart normal, status post left subclavian pacemaker therapy.  New bilateral interstitial infiltrates diffusely involving lungs with new limited pulmonary vascular congestion.  No new consolidation pleural reaction.

## 2018-06-07 NOTE — PT/OT/SLP PROGRESS
Occupational Therapy  Visit Attempt    Patient Name:  Franklin Christian   MRN:  [id number]    Patient not seen this PM 2/2 nsg hold due to pt recently becoming anxious and with increased SOB.   . Will follow-up tomorrow's date.    Ina Scott OT  6/7/2018

## 2018-06-07 NOTE — HPI
Ms. Christian is a 71 yo  female with COPD, DM 2, HTN, hx MI, and CKD 3 who presents today with SOB. She reports she has been having difficulty breathing for one week, accompanied by abdominal pain, chest pain, and productive cough. She reports burning with urination as well. The morning of 6/7 she awoke with severe SOB that was not relieved with her inhaler. She presented to the ED and was found to have a BNP of 873 and pulmonary vascular congestion on CXR. She was given 40 mg IV lasix, duonebs, and solumedrol, and placed on supplemental oxygen. She was admitted to hospital medicine.   Other notable complaint includes 1 month of vaginal bleeding. She has a gynecologist appt on June 20. She reports that she previously had uterine cancer of some sort.   Ms. Christian lives at home. She has oxygen at home but it belongs to someone else. She uses a CPAP.

## 2018-06-07 NOTE — ED NOTES
Report received from leonardo torres patient awake alert ox4 call light at bedside family at bedside nurse will continue to monitor

## 2018-06-07 NOTE — ASSESSMENT & PLAN NOTE
. No recent A1C.   Check blood glucose AC and HS and use SSI.  Check A1c.  Diabetic diet. Continue home insulin 10 units daily. Monitor closely while on steroids.

## 2018-06-07 NOTE — ED NOTES
# [id number].  Complaining of feeling short of breath, abdominal pain, nausea and vomiting. States she vomited twice today.

## 2018-06-08 PROBLEM — R79.89 ELEVATED SERUM CREATININE: Status: ACTIVE | Noted: 2018-06-08

## 2018-06-08 PROBLEM — R53.81 DEBILITY: Status: ACTIVE | Noted: 2018-06-08

## 2018-06-08 LAB
ANION GAP SERPL CALC-SCNC: 13 MMOL/L
BUN SERPL-MCNC: 37 MG/DL
CALCIUM SERPL-MCNC: 9.3 MG/DL
CHLORIDE SERPL-SCNC: 97 MMOL/L
CO2 SERPL-SCNC: 24 MMOL/L
CREAT SERPL-MCNC: 2.1 MG/DL
EST. GFR  (AFRICAN AMERICAN): 27 ML/MIN/1.73 M^2
EST. GFR  (NON AFRICAN AMERICAN): 23 ML/MIN/1.73 M^2
ESTIMATED AVG GLUCOSE: 243 MG/DL
ESTIMATED AVG GLUCOSE: 243 MG/DL
GLUCOSE SERPL-MCNC: 439 MG/DL
GLUCOSE SERPL-MCNC: 569 MG/DL
GLUCOSE SERPL-MCNC: >500 MG/DL (ref 70–110)
HBA1C MFR BLD HPLC: 10.1 %
HBA1C MFR BLD HPLC: 10.1 %
MAGNESIUM SERPL-MCNC: 1.6 MG/DL
PHOSPHATE SERPL-MCNC: 4.2 MG/DL
POCT GLUCOSE: 207 MG/DL (ref 70–110)
POCT GLUCOSE: 277 MG/DL (ref 70–110)
POCT GLUCOSE: 328 MG/DL (ref 70–110)
POCT GLUCOSE: 374 MG/DL (ref 70–110)
POCT GLUCOSE: 430 MG/DL (ref 70–110)
POCT GLUCOSE: 435 MG/DL (ref 70–110)
POTASSIUM SERPL-SCNC: 4.6 MMOL/L
SODIUM SERPL-SCNC: 134 MMOL/L
TROPONIN I SERPL DL<=0.01 NG/ML-MCNC: 0.02 NG/ML

## 2018-06-08 PROCEDURE — 83735 ASSAY OF MAGNESIUM: CPT

## 2018-06-08 PROCEDURE — G8987 SELF CARE CURRENT STATUS: HCPCS | Mod: CM

## 2018-06-08 PROCEDURE — 97161 PT EVAL LOW COMPLEX 20 MIN: CPT

## 2018-06-08 PROCEDURE — 63600175 PHARM REV CODE 636 W HCPCS: Performed by: HOSPITALIST

## 2018-06-08 PROCEDURE — G0378 HOSPITAL OBSERVATION PER HR: HCPCS

## 2018-06-08 PROCEDURE — 86580 TB INTRADERMAL TEST: CPT | Performed by: PHYSICIAN ASSISTANT

## 2018-06-08 PROCEDURE — G8980 MOBILITY D/C STATUS: HCPCS | Mod: CK

## 2018-06-08 PROCEDURE — G8988 SELF CARE GOAL STATUS: HCPCS | Mod: CJ

## 2018-06-08 PROCEDURE — 84484 ASSAY OF TROPONIN QUANT: CPT

## 2018-06-08 PROCEDURE — 25000003 PHARM REV CODE 250: Performed by: HOSPITALIST

## 2018-06-08 PROCEDURE — 94761 N-INVAS EAR/PLS OXIMETRY MLT: CPT

## 2018-06-08 PROCEDURE — 84100 ASSAY OF PHOSPHORUS: CPT

## 2018-06-08 PROCEDURE — 97535 SELF CARE MNGMENT TRAINING: CPT

## 2018-06-08 PROCEDURE — G8978 MOBILITY CURRENT STATUS: HCPCS | Mod: CK

## 2018-06-08 PROCEDURE — 25000242 PHARM REV CODE 250 ALT 637 W/ HCPCS: Performed by: HOSPITALIST

## 2018-06-08 PROCEDURE — 97165 OT EVAL LOW COMPLEX 30 MIN: CPT

## 2018-06-08 PROCEDURE — 83036 HEMOGLOBIN GLYCOSYLATED A1C: CPT

## 2018-06-08 PROCEDURE — 36415 COLL VENOUS BLD VENIPUNCTURE: CPT

## 2018-06-08 PROCEDURE — 25000003 PHARM REV CODE 250: Performed by: PHYSICIAN ASSISTANT

## 2018-06-08 PROCEDURE — 94640 AIRWAY INHALATION TREATMENT: CPT

## 2018-06-08 PROCEDURE — 27000221 HC OXYGEN, UP TO 24 HOURS

## 2018-06-08 PROCEDURE — 80048 BASIC METABOLIC PNL TOTAL CA: CPT

## 2018-06-08 PROCEDURE — 63600175 PHARM REV CODE 636 W HCPCS: Performed by: PHYSICIAN ASSISTANT

## 2018-06-08 PROCEDURE — 82947 ASSAY GLUCOSE BLOOD QUANT: CPT

## 2018-06-08 PROCEDURE — G8979 MOBILITY GOAL STATUS: HCPCS | Mod: CH

## 2018-06-08 PROCEDURE — G8989 SELF CARE D/C STATUS: HCPCS | Mod: CM

## 2018-06-08 PROCEDURE — 63600175 PHARM REV CODE 636 W HCPCS: Performed by: NURSE PRACTITIONER

## 2018-06-08 RX ORDER — ENOXAPARIN SODIUM 100 MG/ML
30 INJECTION SUBCUTANEOUS EVERY 24 HOURS
Status: DISCONTINUED | OUTPATIENT
Start: 2018-06-08 | End: 2018-06-10

## 2018-06-08 RX ORDER — FAMOTIDINE 20 MG/1
20 TABLET, FILM COATED ORAL DAILY
Status: DISCONTINUED | OUTPATIENT
Start: 2018-06-09 | End: 2018-06-10 | Stop reason: HOSPADM

## 2018-06-08 RX ORDER — PANTOPRAZOLE SODIUM 40 MG/1
40 TABLET, DELAYED RELEASE ORAL DAILY
Status: DISCONTINUED | OUTPATIENT
Start: 2018-06-09 | End: 2018-06-10 | Stop reason: HOSPADM

## 2018-06-08 RX ORDER — BUPROPION HYDROCHLORIDE 100 MG/1
100 TABLET, EXTENDED RELEASE ORAL 2 TIMES DAILY
Status: DISCONTINUED | OUTPATIENT
Start: 2018-06-08 | End: 2018-06-10 | Stop reason: HOSPADM

## 2018-06-08 RX ORDER — OXYBUTYNIN CHLORIDE 5 MG/1
5 TABLET ORAL 2 TIMES DAILY
Status: DISCONTINUED | OUTPATIENT
Start: 2018-06-08 | End: 2018-06-10 | Stop reason: HOSPADM

## 2018-06-08 RX ORDER — BACLOFEN 10 MG/1
20 TABLET ORAL 3 TIMES DAILY
Status: DISCONTINUED | OUTPATIENT
Start: 2018-06-08 | End: 2018-06-10 | Stop reason: HOSPADM

## 2018-06-08 RX ORDER — AMLODIPINE BESYLATE 5 MG/1
10 TABLET ORAL DAILY
Status: DISCONTINUED | OUTPATIENT
Start: 2018-06-09 | End: 2018-06-10

## 2018-06-08 RX ORDER — FUROSEMIDE 10 MG/ML
20 INJECTION INTRAMUSCULAR; INTRAVENOUS DAILY
Status: DISCONTINUED | OUTPATIENT
Start: 2018-06-08 | End: 2018-06-08

## 2018-06-08 RX ADMIN — HYDROCODONE BITARTRATE AND ACETAMINOPHEN 1 TABLET: 5; 325 TABLET ORAL at 06:06

## 2018-06-08 RX ADMIN — ONDANSETRON 8 MG: 8 TABLET, ORALLY DISINTEGRATING ORAL at 12:06

## 2018-06-08 RX ADMIN — IPRATROPIUM BROMIDE AND ALBUTEROL SULFATE 3 ML: .5; 3 SOLUTION RESPIRATORY (INHALATION) at 01:06

## 2018-06-08 RX ADMIN — INSULIN ASPART 5 UNITS: 100 INJECTION, SOLUTION INTRAVENOUS; SUBCUTANEOUS at 09:06

## 2018-06-08 RX ADMIN — INSULIN ASPART 8 UNITS: 100 INJECTION, SOLUTION INTRAVENOUS; SUBCUTANEOUS at 09:06

## 2018-06-08 RX ADMIN — INSULIN ASPART 10 UNITS: 100 INJECTION, SOLUTION INTRAVENOUS; SUBCUTANEOUS at 05:06

## 2018-06-08 RX ADMIN — TUBERCULIN PURIFIED PROTEIN DERIVATIVE 5 UNITS: 5 INJECTION INTRADERMAL at 05:06

## 2018-06-08 RX ADMIN — INSULIN HUMAN 10 UNITS: 100 INJECTION, SOLUTION PARENTERAL at 06:06

## 2018-06-08 RX ADMIN — BENZONATATE 100 MG: 100 CAPSULE ORAL at 12:06

## 2018-06-08 RX ADMIN — GUAIFENESIN AND DEXTROMETHORPHAN HYDROBROMIDE 1 TABLET: 600; 30 TABLET, EXTENDED RELEASE ORAL at 09:06

## 2018-06-08 RX ADMIN — PREDNISONE 40 MG: 20 TABLET ORAL at 09:06

## 2018-06-08 RX ADMIN — BUPROPION HYDROCHLORIDE 100 MG: 100 TABLET, FILM COATED, EXTENDED RELEASE ORAL at 09:06

## 2018-06-08 RX ADMIN — CARVEDILOL 12.5 MG: 12.5 TABLET, FILM COATED ORAL at 09:06

## 2018-06-08 RX ADMIN — BENZONATATE 100 MG: 100 CAPSULE ORAL at 02:06

## 2018-06-08 RX ADMIN — IPRATROPIUM BROMIDE AND ALBUTEROL SULFATE 3 ML: .5; 3 SOLUTION RESPIRATORY (INHALATION) at 04:06

## 2018-06-08 RX ADMIN — IPRATROPIUM BROMIDE AND ALBUTEROL SULFATE 3 ML: .5; 3 SOLUTION RESPIRATORY (INHALATION) at 12:06

## 2018-06-08 RX ADMIN — IPRATROPIUM BROMIDE AND ALBUTEROL SULFATE 3 ML: .5; 3 SOLUTION RESPIRATORY (INHALATION) at 08:06

## 2018-06-08 RX ADMIN — GABAPENTIN 300 MG: 300 CAPSULE ORAL at 09:06

## 2018-06-08 RX ADMIN — INSULIN DETEMIR 25 UNITS: 100 INJECTION, SOLUTION SUBCUTANEOUS at 09:06

## 2018-06-08 RX ADMIN — SODIUM CHLORIDE 500 ML: 0.9 INJECTION, SOLUTION INTRAVENOUS at 10:06

## 2018-06-08 RX ADMIN — INSULIN ASPART 10 UNITS: 100 INJECTION, SOLUTION INTRAVENOUS; SUBCUTANEOUS at 06:06

## 2018-06-08 RX ADMIN — BACLOFEN 20 MG: 10 TABLET ORAL at 09:06

## 2018-06-08 RX ADMIN — ENOXAPARIN SODIUM 30 MG: 100 INJECTION SUBCUTANEOUS at 05:06

## 2018-06-08 RX ADMIN — IPRATROPIUM BROMIDE AND ALBUTEROL SULFATE 3 ML: .5; 3 SOLUTION RESPIRATORY (INHALATION) at 05:06

## 2018-06-08 RX ADMIN — BENZONATATE 100 MG: 100 CAPSULE ORAL at 06:06

## 2018-06-08 RX ADMIN — ACETAMINOPHEN 650 MG: 325 TABLET ORAL at 02:06

## 2018-06-08 RX ADMIN — OXYBUTYNIN CHLORIDE 5 MG: 5 TABLET ORAL at 09:06

## 2018-06-08 NOTE — PROGRESS NOTES
The Sw spoke to Marialuisa at [facility] who states they have medically accepted the pt and states she will submit to [facility]. She has spoken to Shannon at [facility] in reference to this pt. If she gets the snf auth from [facility] today she can admit the pt today.

## 2018-06-08 NOTE — PLAN OF CARE
AM Accucheck (>500). Provider informed, stat glucose ordered . Patient educated ( through translation with a Bulgarian speaking employee Minh) on snacks at the bedside that may be causing the spike in sugars

## 2018-06-08 NOTE — PROGRESS NOTES
[facility] Hospital Medicine  Progress Note    Patient Name: Franklin Christian  MRN: [id number]  Patient Class: OP- Observation   Admission Date: 6/7/2018  Length of Stay: 1 days  Attending Physician: Chase Finch MD  Primary Care Provider: Rom Jacques MD        Subjective:     Principal Problem:Acute asthma exacerbation    HPI:  Ms. Christian is a 71 yo  female with COPD, DM 2, HTN, hx MI, and CKD 3 who presents today with SOB. She reports she has been having difficulty breathing for one week, accompanied by abdominal pain, chest pain, and productive cough. She reports burning with urination as well. The morning of 6/7 she awoke with severe SOB that was not relieved with her inhaler. She presented to the ED and was found to have a BNP of 873 and pulmonary vascular congestion on CXR. She was given 40 mg IV lasix, duonebs, and solumedrol, and placed on supplemental oxygen. She was admitted to hospital medicine.   Other notable complaint includes 1 month of vaginal bleeding. She has a gynecologist appt on June 20. She reports that she previously had uterine cancer of some sort.   Ms. Christian lives at home. She has oxygen at home but it belongs to someone else. She uses a CPAP.     Hospital Course:  Pt remains marginally hypoxic on supplemental O2, SpO2 92-94% past 12 hours. BG increased over 600 overnight. Rise in creatinine to 2.1 so lasix d/kandi. Seen by PT/OT d/t marked debility and recommend SNF placement.     Interval History: Pt reports feeling weak. Abdominal pain has improved.     Review of Systems   Respiratory: Positive for cough and shortness of breath.    Cardiovascular: Negative for chest pain and leg swelling.   Gastrointestinal: Negative for abdominal pain, nausea and vomiting.   Genitourinary: Negative for dysuria.   Neurological: Positive for dizziness and weakness (Generalized).     Objective:     Vital Signs (Most Recent):  Temp: 96.2 °F (35.7 °C) (06/08/18  1535)  Pulse: 68 (06/08/18 1617)  Resp: 18 (06/08/18 1535)  BP: (!) 160/69 (06/08/18 1535)  SpO2: (!) 94 % (06/08/18 1233) Vital Signs (24h Range):  Temp:  [96.1 °F (35.6 °C)-97.9 °F (36.6 °C)] 96.2 °F (35.7 °C)  Pulse:  [68-83] 68  Resp:  [16-22] 18  SpO2:  [92 %-98 %] 94 %  BP: (117-184)/(57-82) 160/69     Weight: 109.3 kg (240 lb 15.4 oz)  Body mass index is 44.07 kg/m².    Intake/Output Summary (Last 24 hours) at 06/08/18 1627  Last data filed at 06/08/18 1327   Gross per 24 hour   Intake              610 ml   Output              950 ml   Net             -340 ml      Physical Exam   Cardiovascular: Normal rate and regular rhythm.    Pulmonary/Chest: Tachypnea noted. She has decreased breath sounds. She has wheezes (throughout).   Prolonged expiratory phase  Nasal flaring  Increased work of breathing   Abdominal: Soft. There is no tenderness.   Psychiatric: Her affect is labile. Her speech is tangential.       Significant Labs:   BMP:   Recent Labs  Lab 06/08/18  0323 06/08/18  0546   * 569*   *  --    K 4.6  --    CL 97  --    CO2 24  --    BUN 37*  --    CREATININE 2.1*  --    CALCIUM 9.3  --    MG 1.6  --      CBC:   Recent Labs  Lab 06/07/18  0733   WBC 9.71   HGB 11.5*   HCT 38.6        Urine Studies:   Recent Labs  Lab 06/07/18  1832   COLORU Yellow   APPEARANCEUA Clear   PHUR 6.0   SPECGRAV <=1.005*   PROTEINUA Negative   GLUCUA 3+*   KETONESU Negative   BILIRUBINUA Negative   OCCULTUA Trace*   NITRITE Negative   UROBILINOGEN Negative   LEUKOCYTESUR Negative   RBCUA 0   WBCUA 1   BACTERIA Moderate*   SQUAMEPITHEL 2       Significant Imaging: no new    Assessment/Plan:      * Acute asthma exacerbation    SOB (shortness of breath)  Pt with one week history of SOB and productive cough. Former smoker. SOB also likely has component of acute HF. Some wheezing on decreased breath sounds on exam. Remains on O2.   Continue scheduled duonebs, prednisone 40. Supplemental oxygen. May need home  oxygen.         Pulmonary edema    Elevated serum creatinine  CXR showing bilateral interstitial infiltrates diffusely involving lungs w new limited pulmonary vascular congestion. Pt with cardiac history and s/p pacemaker placement. Echo showing normal EF, indeterminate LV diastolic dysfunction, and PA 27. No edema visible on exam. Cr elevated to 2.1 today, likely d/t lasix. Will hold.   Hold lasix 40 mg daily. Continue home carvedilol. Supplemental oxygen.         Type 2 diabetes mellitus, with long-term current use of insulin     - 680. A1C 10.1.   Check blood glucose AC and HS and use SSI.  Diabetic diet. Home insulin appears to be 65u before breakfast and 55 u before dinner. Will order. Monitor closely while on steroids.        Essential hypertension    Home meds appear to be amlodipine 10 mg daily, lasix 40 mg daily, lisinopril 10 mg daily.  Continue amlodipine but hold lasix and lisinopril for elevated Cr.         Debility    PT/OT consulted d/t pt's reported inability to care for herself. Recommend SNF.             VTE Risk Mitigation         Ordered     enoxaparin injection 30 mg  Daily      06/08/18 1547     IP VTE HIGH RISK PATIENT  Once      06/07/18 1037              Cairssa Barney PA-C  Department of Hospital Medicine   [facility]

## 2018-06-08 NOTE — ASSESSMENT & PLAN NOTE
Elevated serum creatinine  CXR showing bilateral interstitial infiltrates diffusely involving lungs w new limited pulmonary vascular congestion. Pt with cardiac history and s/p pacemaker placement. Echo showing normal EF, indeterminate LV diastolic dysfunction, and PA 27. No edema visible on exam. Cr elevated to 2.1 today, likely d/t lasix. Will hold.   Hold lasix 40 mg daily. Continue home carvedilol. Supplemental oxygen.

## 2018-06-08 NOTE — PLAN OF CARE
Through the help of the day nurse's (Lucy) translation, patient was informed of her upcoming meds. She was also informed of the language line and that it is available at no cost

## 2018-06-08 NOTE — PLAN OF CARE
Plan of care reviewed with patient (per translation). Patient verbalized complete understanding. Fall precautions maintained. Bed in lowest position, locked, call light within reach and bed alarm is on. Side rails up x's 2 with slip resistant socks on.  Accuchecks performed, provider informed of abnormal values. PRN hydralizine given for abnormal b/p Nurse instructed patient to notify staff for any assistance and the patient verbalized complete understanding. Patient on telemetry throughout shift with no ectopy noted. Will continue to monitor.

## 2018-06-08 NOTE — PT/OT/SLP EVAL
Physical Therapy Evaluation    Patient Name:  Franklin Christian   MRN:  [id number]    Recommendations:     Discharge Recommendations:  nursing facility, skilled   Discharge Equipment Recommendations: none   Barriers to discharge: Decreased caregiver support    Assessment:     Franklin Christian is a 72 y.o. female admitted with a medical diagnosis of Acute asthma exacerbation.  She presents with the following impairments/functional limitations:  weakness, impaired endurance, gait instability, impaired functional mobilty, impaired self care skills, impaired balance, decreased safety awareness, pain, impaired cardiopulmonary response to activity .    Rehab Prognosis:  Good; patient would benefit from acute skilled PT services to address these deficits and reach maximum level of function.      Recent Surgery: * No surgery found *      Plan:     During this hospitalization, patient to be seen 6 x/week to address the above listed problems via gait training, therapeutic activities, therapeutic exercises  · Plan of Care Expires:  06/22/18   Plan of Care Reviewed with: patient    Subjective     Communicated with nsg prior to session.  Patient found HOB elevated upon PT entry to room, agreeable to evaluation.  Pt is Divehi speaking.  Friends/family present to interpret     Chief Complaint: pain  Patient comments/goals: SNF  Pain/Comfort:  · Pain Rating 1: 4/10  · Location 1: abdomen  · Pain Addressed 1: Reposition, Pre-medicate for activity, Distraction, Cessation of Activity  · Pain Rating Post-Intervention 1: 4/10    Patients cultural, spiritual, Catholic conflicts given the current situation: none    Living Environment:  [facility]  Has a friend that sleeps there at night  Prior to admission, patients level of function was Mod Indep with HH ambulation and t/f's.  Patient has the following equipment: glucometer, CPAP, oxygen, walker, rolling, bedside commode, shower chair, hospital bed (concentrator, all  chasityrowed).  DME owned (not currently used): none.  Upon discharge, patient will have assistance from [employer] members.    Objective:     Patient found with: peripheral IV, bed alarm, telemetry     General Precautions: Standard, fall   Orthopedic Precautions:N/A   Braces: N/A     Exams:  · Cognitive Exam:  Patient is oriented to Person and follows 100% off 1-step commands   · Gross Motor Coordination:  Mildly impaired 2/2 gen weakness and obesity  · Postural Exam:  Patient presented with the following abnormalities:    · -       Rounded shoulders  · -       Forward head  · Pendulous abdomen  · Forward flexed spine  · Sensation:    · -       Intact  light/touch B Le's  · Skin Integrity/Edema:      · -       Skin integrity: Visible skin intact  · RLE ROM: WFL  · RLE Strength: WFL  · LLE ROM: WFL  · LLE Strength: WFL    Functional Mobility:  · Bed Mobility:     · Scooting: stand by assistance  · Supine to Sit: stand by assistance with HOB elevated  · Sit to Supine: stand by assistance  · Transfers:     · Sit to Stand:  contact guard assistance with rolling walker and VC for hand placement/safety  · Gait: 25' with RW and CGA with VC for increased trunk ext and walker management/safety  · Balance: Fair+ sit, Fair stand    AM-PAC 6 CLICK MOBILITY  Total Score:17       Therapeutic Activities and Exercises:   eval only    Patient left HOB elevated with all lines intact, call button in reach, bed alarm on and nsg notified.friends/family present    GOALS:    Physical Therapy Goals        Problem: Physical Therapy Goal    Goal Priority Disciplines Outcome Goal Variances Interventions   Physical Therapy Goal     PT/OT, PT Ongoing (interventions implemented as appropriate)     Description:  Goals to be met by: 2018     Patient will increase functional independence with mobility by performin. Supine to sit with Modified [place]  2. Sit to stand transfer with Modified [place]  3. Gait  x 150 feet with Modified  [place] using Rolling Walker.   4. Lower extremity exercise program x10-15 reps per handout, with supervision                      History:     Past Medical History:   Diagnosis Date    Diabetes mellitus     Hypertension     MI, old     Renal disorder        Past Surgical History:   Procedure Laterality Date    CARDIAC PACEMAKER PLACEMENT      CARDIAC PACEMAKER PLACEMENT      HYSTERECTOMY      SHOULDER SURGERY         Clinical Decision Making:     History  Co-morbidities and personal factors that may impact the plan of care Examination  Body Structures and Functions, activity limitations and participation restrictions that may impact the plan of care Clinical Presentation   Decision Making/ Complexity Score   Co-morbidities:   [] Time since onset of injury / illness / exacerbation  [] Status of current condition  []Patient's cognitive status and safety concerns    [] Multiple Medical Problems (see med hx)  Personal Factors:   [] Patient's age  [] Prior Level of function   [] Patient's home situation (environment and family support)  [] Patient's level of motivation  [] Expected progression of patient      HISTORY:(criteria)    [] 44381 - no personal factors/history    [] 23383 - has 1-2 personal factor/comorbidity     [] 68961 - has >3 personal factor/comorbidity     Body Regions:  [] Objective examination findings  [] Head     []  Neck  [] Trunk   [] Upper Extremity  [] Lower Extremity    Body Systems:  [] For communication ability, affect, cognition, language, and learning style: the assessment of the ability to make needs known, consciousness, orientation (person, place, and time), expected emotional /behavioral responses, and learning preferences (eg, learning barriers, education  needs)  [] For the neuromuscular system: a general assessment of gross coordinated movement (eg, balance, gait, locomotion, transfers, and transitions) and motor function  (motor control and motor learning)  [] For the  musculoskeletal system: the assessment of gross symmetry, gross range of motion, gross strength, height, and weight  [] For the integumentary system: the assessment of pliability(texture), presence of scar formation, skin color, and skin integrity  [] For cardiovascular/pulmonary system: the assessment of heart rate, respiratory rate, blood pressure, and edema     Activity limitations:    [] Patient's cognitive status and saf ety concerns          [] Status of current condition      [] Weight bearing restriction  [] Cardiopulmunary Restriction    Participation Restrictions:   [] Goals and goal agreement with the patient     [] Rehab potential (prognosis) and probable outcome      Examination of Body System: (criteria)    [] 06723 - addressing 1-2 elements    [] 82905 - addressing a total of 3 or more elements     [] 01512 -  Addressing a total of 4 or more elements         Clinical Presentation: (criteria)  Choose one     On examination of body system using standardized tests and measures patient presents with (CHOOSE ONE) elements from any of the following: body structures and functions, activity limitations, and/or participation restrictions.  Leading to a clinical presentation that is considered (CHOOSE ONE)                              Clinical Decision Making  (Eval Complexity):  Choose One     Time Tracking:     PT Received On: 06/08/18  PT Start Time: 1318     PT Stop Time: 1330  PT Total Time (min): 12 min     Billable Minutes: all lines intact, call button in reach, bed alarm on and nsg notified      Jaqueline Pendleton, PT  06/08/2018

## 2018-06-08 NOTE — PLAN OF CARE
Problem: Patient Care Overview  Goal: Plan of Care Review  Outcome: Ongoing (interventions implemented as appropriate)  Plan of care reivewed with patient and daughter.  Both verbalizes understanding. Patient requests to have her daughter translate instead of using language line and call to language line was disconnected.  Patient complained of abdominal cramps 4/10. Given Tylenol with good effect on follow-up.     Patient became short of breath when up to bedside commode.  Needs reminders to wear oxygen nasal cannula.   O2 96% on 2L.    PT worked with patient today.     Medicated as ordered for blood glucose management.

## 2018-06-08 NOTE — PLAN OF CARE
Nurse informed Ms. Mile NP, of the patient current accucheck,( 500), PRN insulin given, and that at stat lab glucose was drawn. Also, nurse informed the provider that the patient current manual b/p 180/78 and that the patient is having persistent abd pain, despite recently receiving Norco

## 2018-06-08 NOTE — PLAN OF CARE
Problem: Physical Therapy Goal  Goal: Physical Therapy Goal  Goals to be met by: 2018     Patient will increase functional independence with mobility by performin. Supine to sit with Modified [place]  2. Sit to stand transfer with Modified [place]  3. Gait  x 150 feet with Modified [place] using Rolling Walker.   4. Lower extremity exercise program x10-15 reps per handout, with supervision    Outcome: Ongoing (interventions implemented as appropriate)  Initial PT evaluation performed.  Pt could benefit from skilled PT services 6x/wk in order to maximize function prior to D/C.  SNF recommended.

## 2018-06-08 NOTE — ASSESSMENT & PLAN NOTE
Home meds appear to be amlodipine 10 mg daily, lasix 40 mg daily, lisinopril 10 mg daily.  Continue amlodipine but hold lasix and lisinopril for elevated Cr.

## 2018-06-08 NOTE — PROGRESS NOTES
The Sw received a call from [email or web address] and completed the pt's Locet. The Sw faxed the PASRR to [facility].The Sw faxed the pt's info to [facility] via [facility].

## 2018-06-08 NOTE — PLAN OF CARE
Problem: Occupational Therapy Goal  Goal: Occupational Therapy Goal  Goals to be met by: 07/08/18     Patient will increase functional independence with ADLs by performing:    Grooming while standing with Minimal Assistance.  Toileting from bedside commode with Minimal Assistance for hygiene and clothing management.   Toilet transfer to bedside commode with Contact Guard Assistance.  Increased functional strength to WFL for ADLs.    **ongoing assessment    Outcome: Ongoing (interventions implemented as appropriate)  Limited OT eval this date x 3 attempts. Via , pt states that she lives alone in [facility] w/ 0STE. Has a male Worship member sleep over QD, but doesn't provide A. Another Worship member comes 2x/wk to A w/ bathing on TTB & U/LBD. Other members of the [employer] provide food & all pt's current DME. Pt has no fly support. Pt states that she spends majority of the day in a borrowed H bed & gets up to use a borrowed BSC via a borrowed RW. Pt declined to attempt any active eval tasks outside of supine on each of the 3 attempts made this date & demo'ed emotional lability during interview portion. Edu/tx re: HEP & deep breathing techs. Pt verbalized understanding.    Based on PLOF & current resp status, pt w/ decreased overall endurance/conditioning, balance/mobility w/ subsequent decline in (I)/safety w/ BADLs, fxnl mobility & fxnl t/f's. OT 5x/wk to increase phys/fxnl status & maximize potential to achieve established goals for d/c-->SNF & RW, BSC, TTB

## 2018-06-08 NOTE — ASSESSMENT & PLAN NOTE
- 680. A1C 10.1.   Check blood glucose AC and HS and use SSI.  Diabetic diet. Home insulin appears to be 65u before breakfast and 55 u before dinner. Will order. Monitor closely while on steroids.

## 2018-06-08 NOTE — PROGRESS NOTES
The Sw attempted to call the pt's Locet in but was placed on a list to receive a return call due to high call volume.

## 2018-06-08 NOTE — SUBJECTIVE & OBJECTIVE
Interval History: Pt reports feeling weak. Abdominal pain has improved.     Review of Systems   Respiratory: Positive for cough and shortness of breath.    Cardiovascular: Negative for chest pain and leg swelling.   Gastrointestinal: Negative for abdominal pain, nausea and vomiting.   Genitourinary: Negative for dysuria.   Neurological: Positive for dizziness and weakness (Generalized).     Objective:     Vital Signs (Most Recent):  Temp: 96.2 °F (35.7 °C) (06/08/18 1535)  Pulse: 68 (06/08/18 1617)  Resp: 18 (06/08/18 1535)  BP: (!) 160/69 (06/08/18 1535)  SpO2: (!) 94 % (06/08/18 1233) Vital Signs (24h Range):  Temp:  [96.1 °F (35.6 °C)-97.9 °F (36.6 °C)] 96.2 °F (35.7 °C)  Pulse:  [68-83] 68  Resp:  [16-22] 18  SpO2:  [92 %-98 %] 94 %  BP: (117-184)/(57-82) 160/69     Weight: 109.3 kg (240 lb 15.4 oz)  Body mass index is 44.07 kg/m².    Intake/Output Summary (Last 24 hours) at 06/08/18 1627  Last data filed at 06/08/18 1327   Gross per 24 hour   Intake              610 ml   Output              950 ml   Net             -340 ml      Physical Exam   Cardiovascular: Normal rate and regular rhythm.    Pulmonary/Chest: Tachypnea noted. She has decreased breath sounds. She has wheezes (throughout).   Prolonged expiratory phase  Nasal flaring  Increased work of breathing   Abdominal: Soft. There is no tenderness.   Psychiatric: Her affect is labile. Her speech is tangential.       Significant Labs:   BMP:   Recent Labs  Lab 06/08/18  0323 06/08/18  0546   * 569*   *  --    K 4.6  --    CL 97  --    CO2 24  --    BUN 37*  --    CREATININE 2.1*  --    CALCIUM 9.3  --    MG 1.6  --      CBC:   Recent Labs  Lab 06/07/18  0733   WBC 9.71   HGB 11.5*   HCT 38.6        Urine Studies:   Recent Labs  Lab 06/07/18  1832   COLORU Yellow   APPEARANCEUA Clear   PHUR 6.0   SPECGRAV <=1.005*   PROTEINUA Negative   GLUCUA 3+*   KETONESU Negative   BILIRUBINUA Negative   OCCULTUA Trace*   NITRITE Negative    UROBILINOGEN Negative   LEUKOCYTESUR Negative   RBCUA 0   WBCUA 1   BACTERIA Moderate*   SQUAMEPITHEL 2       Significant Imaging: no new

## 2018-06-08 NOTE — PT/OT/SLP EVAL
Occupational Therapy   Evaluation & tx    Name: Franklin Christian  MRN: [id number]  Admitting Diagnosis:  Acute asthma exacerbation      Recommendations:     Discharge Recommendations: nursing facility, skilled  Discharge Equipment Recommendations:  none  Barriers to discharge:  Decreased caregiver support    History:     Occupational Profile:  Living Environment: alone in H w/ 0STE; has male [employer] member sleep over QD  Previous level of function: A w/ baths; U/LBD  Roles and Routines: mostly bedbound per pt report  Equipment Owned:  walker, rolling, bedside commode, shower chair, hospital bed, CPAP, glucometer, oxygen  Assistance upon Discharge: 24hr S/A    Past Medical History:   Diagnosis Date    Diabetes mellitus     Hypertension     MI, old     Renal disorder        Past Surgical History:   Procedure Laterality Date    CARDIAC PACEMAKER PLACEMENT      CARDIAC PACEMAKER PLACEMENT      HYSTERECTOMY      SHOULDER SURGERY         Subjective     Chief Complaint: SOB  Patient/Family stated goals: to get better  Communicated with: nsg prior to session.  Pain/Comfort:  · Pain Rating 1: 4/10  · Location 1: abdomen  · Pain Addressed 1: Distraction, Cessation of Activity  · Pain Rating Post-Intervention 1: 4/10    Patients cultural, spiritual, Latter-day conflicts given the current situation:      Objective:     Patient found with: peripheral IV, bed alarm, telemetry, oxygen    General Precautions: Standard, fall   Orthopedic Precautions:N/A   Braces: N/A     Occupational Performance:    Bed Mobility:    · Pt declined to attempt    Functional Mobility/Transfers:  ·   · Functional Mobility: pt declined to attempt    Activities of Daily Living:  · Feeding:  stand by assistance in supine    Cognitive/Visual Perceptual:  Emotionally labile during interview  A+Ox4    Physical Exam:  B UEs WFL at 4-/5      Patient left HOB elevated with all lines intact, call button in reach, bed alarm on and nsg notified    [facility] 6  "Click:  [facility] Total Score: 8    Treatment & Education:  Limited OT eval this date x 3 attempts. Via , pt states that she lives alone in [facility] w/ 0STE. Has a male [employer] member sleep over QD, but doesn't provide A. Another Anab[employer] member comes 2x/wk to A w/ bathing on TTB & U/LBD. Other members of the [employer] provide food & all pt's current DME. Pt has no fly support. Pt states that she spends majority of the day in a borrowed H bed & gets up to use a borrowed BSC via a borrowed RW. Pt declined to attempt any active eval tasks outside of supine on each of the 3 attempts made this date & demo'ed emotional lability during interview portion. Edu/tx re: HEP & deep breathing techs. Pt verbalized understanding.      Assessment:   Based on PLOF & current resp status, pt w/ decreased overall endurance/conditioning, balance/mobility w/ subsequent decline in (I)/safety w/ BADLs, fxnl mobility & fxnl t/f's. OT 5x/wk to increase phys/fxnl status & maximize potential to achieve established goals for d/c-->SNF & RW, BSC, TTB    Franklin Christian is a 72 y.o. female with a medical diagnosis of Acute asthma exacerbation.  She presents with ..  Performance deficits affecting function are weakness, impaired endurance, gait instability, impaired functional mobilty, impaired self care skills, impaired balance, decreased lower extremity function, decreased upper extremity function, decreased coordination, decreased safety awareness, pain, impaired cardiopulmonary response to activity.      Rehab Prognosis:  good; patient would benefit from acute skilled OT services to address these deficits and reach maximum level of function.         Clinical Decision Makin.  OT Low:  "Pt evaluation falls under low complexity for evaluation coding due to performance deficits noted in 1-3 areas as stated above and 0 co-morbities affecting current functional status. Data obtained from problem focused assessments. No modifications or " "assistance was required for completion of evaluation. Only brief occupational profile and history review completed."     Plan:     Patient to be seen 5 x/week to address the above listed problems via self-care/home management, therapeutic activities, therapeutic exercises  · Plan of Care Expires: 07/08/18  · Plan of Care Reviewed with: patient    This Plan of care has been discussed with the patient who was involved in its development and understands and is in agreement with the identified goals and treatment plan    GOALS:    Occupational Therapy Goals        Problem: Occupational Therapy Goal    Goal Priority Disciplines Outcome Interventions   Occupational Therapy Goal     OT, PT/OT Ongoing (interventions implemented as appropriate)    Description:  Goals to be met by: 07/08/18     Patient will increase functional independence with ADLs by performing:    Grooming while standing with Minimal Assistance.  Toileting from bedside commode with Minimal Assistance for hygiene and clothing management.   Toilet transfer to bedside commode with Contact Guard Assistance.  Increased functional strength to WFL for ADLs.    **ongoing assessment                      Time Tracking:     OT Date of Treatment: 06/08/18  OT Start Time: 1045  OT Stop Time: 1108  OT Total Time (min): 23 min    Billable Minutes:Evaluation 10  Self Care/Home Management 13  Total Time 23    CHIOMA Casper  6/8/2018    "

## 2018-06-08 NOTE — PROGRESS NOTES
The Sw received a call from Marialuisa stating she spoke to Shannon at [facility] but she states she still has another person to review before she can get to this pt and doubts she will have an answer today for snf but states she will review the pt early Monday for a determination. The Sw left a message for Shannon at [facility] informing her the pt's in OBS and can her case be expedited in any way.      4:30pm The Sw spoke to Yenifer and the pt's not medically ready for d/c today. The pt will not be able to d/c until Monday b/c [facility] is closed over the weekend and the pt must receive the auth to go to [facility].

## 2018-06-08 NOTE — HOSPITAL COURSE
Pt remains hypoxic on supplemental O2, SpO2 92-94% consistently. Becomes SOB with cough. BG increased over 600; improved with increased insulin. Rise in creatinine to 2.1 so lasix d/kandi, improved to 1.6. Seen by PT/OT d/t marked debility and recommend SNF placement. On 6/10 called to bedside by RN for AMS; pt obtunded and not responding as usual, anisocoria noted. Code Stroke called. Per VN start tPA and stat transfer to main [place]. Pt received tPA without incident. Pt will d/c and transfer.

## 2018-06-08 NOTE — PLAN OF CARE
"Patient c/o of feeling "high" (through translation). Nurse performed accucheck. Accucheck >500. Patient has recently received 10 units of regular Insulin. Will re-assess at a later time  "

## 2018-06-08 NOTE — ASSESSMENT & PLAN NOTE
SOB (shortness of breath)  Pt with one week history of SOB and productive cough. Former smoker. SOB also likely has component of acute HF. Some wheezing on decreased breath sounds on exam. Remains on O2.   Continue scheduled duonebs, prednisone 40. Supplemental oxygen. May need home oxygen.

## 2018-06-08 NOTE — PLAN OF CARE
TN went to speak with patient. Belle the  at bedside. Facesheet information reviewed. Patient lives at home alone. Her people from her [employer] help her. She does not drive, but her [employer] friend Mrs. Luo provides transportation. Mrs. Luo also helps patient during the day if needed. Patient does not have any own DME. She has equipment from her [employer] members like a CPAP, glucometer, rolling walker, concentrator (oxygen), shower chair, and hospital bed. Patient's PCP is Dr. Tellez?    Therapy is recommending SNF for discharge plan. She was educated on this. Patient is agreeable for SNF on discharge. She would like places in [place].     SNF Preferences:  1) [facility]  2) [facility].     TN tried calling patient's daughter, no response. Unable to leave voicemail.    TN also had nurse Lucy makes sure patient wanted to go to SNF as the discharge plan. Patient was also on the phone with Normal. They are in agreement for SNF placement and want to go to the places in [place]. TN also informed them will need insurance approval as well.    [id number]    --TN spoke with Shannon from [facility]. Patient was unsure of insurance. Shannon confirmed patient does have [employer]. TN informed her we will be working on SNF placement for patient.     06/08/18 1208   Discharge Assessment   Assessment Type Discharge Planning Assessment   Confirmed/corrected address and phone number on facesheet? Yes   Assessment information obtained from? Patient;Medical Record   Prior to hospitilization cognitive status: Alert/Oriented   Prior to hospitalization functional status: Needs Assistance;Assistive Equipment   Current cognitive status: Alert/Oriented   Current Functional Status: Assistive Equipment;Needs Assistance   Facility Arrived From: Home    Lives With alone   Able to Return to Prior Arrangements unable to determine at this time (comments)   Patient's perception of discharge disposition  skilled nursing facility   Readmission Within The Last 30 Days no previous admission in last 30 days   Equipment Currently Used at Home glucometer;CPAP;oxygen;walker, rolling;bedside commode   Do you have any problems affording any of your prescribed medications? TBD   Discharge Plan A Skilled Nursing Facility   Discharge Plan B Home with family;Home Health   Patient/Family In Agreement With Plan yes     Yenifer Blanc RN  Transition Navigator  [phone]

## 2018-06-09 LAB
ANION GAP SERPL CALC-SCNC: 8 MMOL/L
BUN SERPL-MCNC: 54 MG/DL
CALCIUM SERPL-MCNC: 9.1 MG/DL
CHLORIDE SERPL-SCNC: 100 MMOL/L
CO2 SERPL-SCNC: 28 MMOL/L
CREAT SERPL-MCNC: 2.4 MG/DL
EST. GFR  (AFRICAN AMERICAN): 23 ML/MIN/1.73 M^2
EST. GFR  (NON AFRICAN AMERICAN): 20 ML/MIN/1.73 M^2
GLUCOSE SERPL-MCNC: 346 MG/DL
MAGNESIUM SERPL-MCNC: 1.8 MG/DL
PHOSPHATE SERPL-MCNC: 4.3 MG/DL
POCT GLUCOSE: 193 MG/DL (ref 70–110)
POCT GLUCOSE: 240 MG/DL (ref 70–110)
POCT GLUCOSE: 270 MG/DL (ref 70–110)
POCT GLUCOSE: 400 MG/DL (ref 70–110)
POTASSIUM SERPL-SCNC: 4.5 MMOL/L
SODIUM SERPL-SCNC: 136 MMOL/L

## 2018-06-09 PROCEDURE — 25000003 PHARM REV CODE 250: Performed by: PHYSICIAN ASSISTANT

## 2018-06-09 PROCEDURE — 97530 THERAPEUTIC ACTIVITIES: CPT

## 2018-06-09 PROCEDURE — 36415 COLL VENOUS BLD VENIPUNCTURE: CPT

## 2018-06-09 PROCEDURE — 63600175 PHARM REV CODE 636 W HCPCS: Performed by: PHYSICIAN ASSISTANT

## 2018-06-09 PROCEDURE — 63600175 PHARM REV CODE 636 W HCPCS: Performed by: NURSE PRACTITIONER

## 2018-06-09 PROCEDURE — 97116 GAIT TRAINING THERAPY: CPT | Mod: 59

## 2018-06-09 PROCEDURE — 27000221 HC OXYGEN, UP TO 24 HOURS

## 2018-06-09 PROCEDURE — 83735 ASSAY OF MAGNESIUM: CPT

## 2018-06-09 PROCEDURE — G0378 HOSPITAL OBSERVATION PER HR: HCPCS

## 2018-06-09 PROCEDURE — 84100 ASSAY OF PHOSPHORUS: CPT

## 2018-06-09 PROCEDURE — 94761 N-INVAS EAR/PLS OXIMETRY MLT: CPT

## 2018-06-09 PROCEDURE — 94640 AIRWAY INHALATION TREATMENT: CPT

## 2018-06-09 PROCEDURE — 25000242 PHARM REV CODE 250 ALT 637 W/ HCPCS: Performed by: HOSPITALIST

## 2018-06-09 PROCEDURE — 63600175 PHARM REV CODE 636 W HCPCS: Performed by: HOSPITALIST

## 2018-06-09 PROCEDURE — 80048 BASIC METABOLIC PNL TOTAL CA: CPT

## 2018-06-09 PROCEDURE — 25000003 PHARM REV CODE 250: Performed by: HOSPITALIST

## 2018-06-09 RX ORDER — SODIUM CHLORIDE 9 MG/ML
INJECTION, SOLUTION INTRAVENOUS CONTINUOUS
Status: DISCONTINUED | OUTPATIENT
Start: 2018-06-09 | End: 2018-06-09

## 2018-06-09 RX ORDER — CLONIDINE HYDROCHLORIDE 0.2 MG/1
0.2 TABLET ORAL EVERY 4 HOURS PRN
Status: DISCONTINUED | OUTPATIENT
Start: 2018-06-09 | End: 2018-06-09

## 2018-06-09 RX ORDER — CLONIDINE HYDROCHLORIDE 0.1 MG/1
0.1 TABLET ORAL 3 TIMES DAILY
Status: DISCONTINUED | OUTPATIENT
Start: 2018-06-09 | End: 2018-06-10

## 2018-06-09 RX ADMIN — IPRATROPIUM BROMIDE AND ALBUTEROL SULFATE 3 ML: .5; 3 SOLUTION RESPIRATORY (INHALATION) at 01:06

## 2018-06-09 RX ADMIN — BUPROPION HYDROCHLORIDE 100 MG: 100 TABLET, FILM COATED, EXTENDED RELEASE ORAL at 08:06

## 2018-06-09 RX ADMIN — IPRATROPIUM BROMIDE AND ALBUTEROL SULFATE 3 ML: .5; 3 SOLUTION RESPIRATORY (INHALATION) at 04:06

## 2018-06-09 RX ADMIN — IPRATROPIUM BROMIDE AND ALBUTEROL SULFATE 3 ML: .5; 3 SOLUTION RESPIRATORY (INHALATION) at 07:06

## 2018-06-09 RX ADMIN — BUPROPION HYDROCHLORIDE 100 MG: 100 TABLET, FILM COATED, EXTENDED RELEASE ORAL at 09:06

## 2018-06-09 RX ADMIN — PANTOPRAZOLE SODIUM 40 MG: 40 TABLET, DELAYED RELEASE ORAL at 09:06

## 2018-06-09 RX ADMIN — BACLOFEN 20 MG: 10 TABLET ORAL at 09:06

## 2018-06-09 RX ADMIN — BACLOFEN 20 MG: 10 TABLET ORAL at 08:06

## 2018-06-09 RX ADMIN — SODIUM CHLORIDE: 0.9 INJECTION, SOLUTION INTRAVENOUS at 09:06

## 2018-06-09 RX ADMIN — BACLOFEN 20 MG: 10 TABLET ORAL at 04:06

## 2018-06-09 RX ADMIN — CLONIDINE HYDROCHLORIDE 0.1 MG: 0.1 TABLET ORAL at 08:06

## 2018-06-09 RX ADMIN — PREDNISONE 40 MG: 20 TABLET ORAL at 09:06

## 2018-06-09 RX ADMIN — AMLODIPINE BESYLATE 10 MG: 5 TABLET ORAL at 09:06

## 2018-06-09 RX ADMIN — INSULIN ASPART 4 UNITS: 100 INJECTION, SOLUTION INTRAVENOUS; SUBCUTANEOUS at 04:06

## 2018-06-09 RX ADMIN — CLONIDINE HYDROCHLORIDE 0.1 MG: 0.1 TABLET ORAL at 04:06

## 2018-06-09 RX ADMIN — ENOXAPARIN SODIUM 30 MG: 100 INJECTION SUBCUTANEOUS at 04:06

## 2018-06-09 RX ADMIN — OXYBUTYNIN CHLORIDE 5 MG: 5 TABLET ORAL at 09:06

## 2018-06-09 RX ADMIN — IPRATROPIUM BROMIDE AND ALBUTEROL SULFATE 3 ML: .5; 3 SOLUTION RESPIRATORY (INHALATION) at 12:06

## 2018-06-09 RX ADMIN — GUAIFENESIN AND DEXTROMETHORPHAN HYDROBROMIDE 1 TABLET: 600; 30 TABLET, EXTENDED RELEASE ORAL at 09:06

## 2018-06-09 RX ADMIN — OXYBUTYNIN CHLORIDE 5 MG: 5 TABLET ORAL at 08:06

## 2018-06-09 RX ADMIN — ONDANSETRON 8 MG: 8 TABLET, ORALLY DISINTEGRATING ORAL at 09:06

## 2018-06-09 RX ADMIN — HYDRALAZINE HYDROCHLORIDE 10 MG: 20 INJECTION INTRAMUSCULAR; INTRAVENOUS at 12:06

## 2018-06-09 RX ADMIN — INSULIN ASPART 6 UNITS: 100 INJECTION, SOLUTION INTRAVENOUS; SUBCUTANEOUS at 09:06

## 2018-06-09 RX ADMIN — INSULIN ASPART 5 UNITS: 100 INJECTION, SOLUTION INTRAVENOUS; SUBCUTANEOUS at 08:06

## 2018-06-09 RX ADMIN — FAMOTIDINE 20 MG: 20 TABLET, FILM COATED ORAL at 09:06

## 2018-06-09 RX ADMIN — IPRATROPIUM BROMIDE AND ALBUTEROL SULFATE 3 ML: .5; 3 SOLUTION RESPIRATORY (INHALATION) at 09:06

## 2018-06-09 RX ADMIN — IPRATROPIUM BROMIDE AND ALBUTEROL SULFATE 3 ML: .5; 3 SOLUTION RESPIRATORY (INHALATION) at 05:06

## 2018-06-09 RX ADMIN — GUAIFENESIN AND DEXTROMETHORPHAN HYDROBROMIDE 1 TABLET: 600; 30 TABLET, EXTENDED RELEASE ORAL at 08:06

## 2018-06-09 NOTE — PT/OT/SLP PROGRESS
Physical Therapy Treatment    Patient Name:  Franklin Christian   MRN:  [id number]    Recommendations:     Discharge Recommendations:  nursing facility, skilled   Discharge Equipment Recommendations:     Barriers to discharge: Decreased caregiver support    Assessment:     Franklin Christian is a 72 y.o. female admitted with a medical diagnosis of Acute asthma exacerbation.  She presents with the following impairments/functional limitations:  weakness, impaired endurance, impaired self care skills, impaired functional mobilty, gait instability, impaired balance, decreased coordination, decreased upper extremity function, decreased lower extremity function, decreased safety awareness, pain, impaired cardiopulmonary response to activity. Pt ambulated ~60 ft with intermittent closing of eyes which required verbal cueing to keep eyes open. 1 loss of balance which required assistance to correct. At ~50 ft pt with excessive leaning on RW and against hallway wall. Pt would continue to benefit from PT services to increase safety with ambulation and tolerance for out of bed activity.    Rehab Prognosis:  good; patient would benefit from acute skilled PT services to address these deficits and reach maximum level of function.      Recent Surgery: * No surgery found *      Plan:     During this hospitalization, patient to be seen 6 x/week to address the above listed problems via gait training, therapeutic activities, therapeutic exercises  · Plan of Care Expires:  06/22/18   Plan of Care Reviewed with: patient    Subjective     Communicated with nurse prior to session.  Patient found supine upon PT entry to room, agreeable to treatment.      Chief Complaint: None expressed through   Patient comments/goals: Did not express any pain but was very emotional throughout treatment.  Pain/Comfort:  · Pain Rating 1:  (Did not rate or express but was very emotional)    Patients cultural, spiritual, Restoration conflicts given the  current situation: None reported to PTA    Objective:     Patient found with: peripheral IV, bed alarm, telemetry, oxygen     General Precautions: Standard, fall   Orthopedic Precautions:N/A   Braces: N/A     Functional Mobility:  · Bed Mobility:     · Rolling Left:  stand by assistance  · Scooting: stand by assistance  · Supine to Sit: stand by assistance  · Sit to Supine: contact guard assistance  · Transfers:     · Sit to Stand:  contact guard assistance and close CGA  with rolling walker  · Toilet Transfer: contact guard assistance with  rolling walker  using  Step Transfer  · Gait: ~60 ft with RW and close CGA. Nurse present during ambulation. Pt intermittly would close eyes, ~50 ft pt leaned forward onto RW and sideways on hallway wall.  · Balance: Static standing=F, dynamic standing= (ambulation) F-      AM-PAC 6 CLICK MOBILITY  Turning over in bed (including adjusting bedclothes, sheets and blankets)?: 3  Sitting down on and standing up from a chair with arms (e.g., wheelchair, bedside commode, etc.): 3  Moving from lying on back to sitting on the side of the bed?: 3  Moving to and from a bed to a chair (including a wheelchair)?: 3  Need to walk in hospital room?: 3  Climbing 3-5 steps with a railing?: 2  Total Score: 17       Therapeutic Activities and Exercises:   All transfers with assistance as documented above. Pt requested to use B/S commode, sat on commode for ~8-10 minutes and unable to perform self-hygiene. Stood in RW for PTA to clean ~5 minutes. Ambulation training ~60 ft with RW,  2L O2 donned, and close CGA. Pt very unsafe as she keeps intermittently closing her eyes. At ~50 ft pt leaned forward onto RW and leaned on hallway wall. Nursing ambulating with pt and PTA and aware. Pt with 1 loss of balance which required PTA to assist for correction. SpO2 levels taken and fluctuated 84-94 on 2L of O2.    Patient left supine with all lines intact, call button in reach, bed alarm on and nurse  notified..    GOALS:    Physical Therapy Goals        Problem: Physical Therapy Goal    Goal Priority Disciplines Outcome Goal Variances Interventions   Physical Therapy Goal     PT/OT, PT Ongoing (interventions implemented as appropriate)     Description:  Goals to be met by: 2018     Patient will increase functional independence with mobility by performin. Supine to sit with Modified [place]  2. Sit to stand transfer with Modified [place]  3. Gait  x 150 feet with Modified [place] using Rolling Walker.   4. Lower extremity exercise program x10-15 reps per handout, with supervision                      Time Tracking:     PT Received On: 18  PT Start Time: 1006     PT Stop Time: 1041  PT Total Time (min): 35 min     Billable Minutes: Gait Training   20 and Therapeutic Activity 15    Treatment Type: Treatment  PT/PTA: PTA     PTA Visit Number: 1     Kenya Levine, PTA  2018

## 2018-06-09 NOTE — PLAN OF CARE
Plan of care reviewed with patient, understanding verbalized.  Pt remains SR on tele. PRN meds given for HTN.  Bed alarm on, call light in reach, fall precautions in place. Report given to oncoming nurse.

## 2018-06-09 NOTE — PLAN OF CARE
Problem: Physical Therapy Goal  Goal: Physical Therapy Goal  Goals to be met by: 2018     Patient will increase functional independence with mobility by performin. Supine to sit with Modified [place]  2. Sit to stand transfer with Modified [place]  3. Gait  x 150 feet with Modified [place] using Rolling Walker.   4. Lower extremity exercise program x10-15 reps per handout, with supervision     Outcome: Ongoing (interventions implemented as appropriate)      Pt continues to work and slowly progress toward goals.

## 2018-06-09 NOTE — PLAN OF CARE
Problem: Patient Care Overview  Goal: Plan of Care Review  Outcome: Ongoing (interventions implemented as appropriate)   06/09/18 1414   Coping/Psychosocial   Plan Of Care Reviewed With patient   Care plan reviewed with Pt verbalized plan of care. AAOx4, family at bedside, no respiratory distress noted, on room air. NSR per cardiac monitor, no red alarm noted. Denies any pain of discomfort, education provided on medication effect and side effect, voices understanding. Safety measures maintained, call light within her reach, no apparent distress noted, bed in low position, bed alarm on, educated on the importance of calling as needed, voices understanding, stable at this time.

## 2018-06-09 NOTE — PROGRESS NOTES
Pharmacist Renal Dose Adjustment Note    Franklin Christian is a 72 y.o. female being treated with the medication Pepcid     Patient Data:    Vital Signs (Most Recent):  Temp: 96.2 °F (35.7 °C) (06/08/18 1535)  Pulse: 76 (06/08/18 1714)  Resp: 20 (06/08/18 1714)  BP: (!) 160/69 (06/08/18 1535)  SpO2: (!) 93 % (06/08/18 1714)   Vital Signs (72h Range):  Temp:  [96.1 °F (35.6 °C)-98.8 °F (37.1 °C)]   Pulse:  [68-97]   Resp:  [16-28]   BP: (117-184)/(38-85)   SpO2:  [91 %-100 %]        Recent Labs     Lab 06/07/18  0733 06/08/18  0323   CREATININE 1.5* 2.1*     Serum creatinine: 2.1 mg/dL (H) 06/08/18 0323  Estimated creatinine clearance: 29 mL/min (A)    Medication: Pepcid dose: 20mg frequency BID will be changed to medication: Pepcid dose: 20mg frequency: Daily    Pharmacist's Name: Aleena Brito  Pharmacist's Extension: [phone]

## 2018-06-09 NOTE — PLAN OF CARE
Problem: Patient Care Overview  Goal: Plan of Care Review  Outcome: Ongoing (interventions implemented as appropriate)  Plan of care reviewed with patient and spouse.  Verbalizes understanding.  NSR on monitor. No red alarms.  Side rails up x 2, up in chair today. bed in lowest position and locked. Bed alarms engaged for patient safety. Ambulates with supervision. No further concerns noted at this time.

## 2018-06-10 ENCOUNTER — HOSPITAL ENCOUNTER (INPATIENT)
Facility: HOSPITAL | Age: 73
LOS: 4 days | Discharge: HOME HEALTH | DRG: 064 | End: 2018-06-14
Attending: PSYCHIATRY & NEUROLOGY | Admitting: PSYCHIATRY & NEUROLOGY
Payer: MEDICARE

## 2018-06-10 VITALS
TEMPERATURE: 98 F | OXYGEN SATURATION: 96 % | BODY MASS INDEX: 45.48 KG/M2 | WEIGHT: 247.13 LBS | DIASTOLIC BLOOD PRESSURE: 80 MMHG | HEIGHT: 62 IN | SYSTOLIC BLOOD PRESSURE: 175 MMHG | RESPIRATION RATE: 31 BRPM | HEART RATE: 81 BPM

## 2018-06-10 DIAGNOSIS — R91.1 PULMONARY NODULE: ICD-10-CM

## 2018-06-10 DIAGNOSIS — Z92.82 RECEIVED TISSUE PLASMINOGEN ACTIVATOR (T-PA) LESS THAN 24 HOURS PRIOR TO ARRIVAL: ICD-10-CM

## 2018-06-10 DIAGNOSIS — R06.02 SOB (SHORTNESS OF BREATH): ICD-10-CM

## 2018-06-10 DIAGNOSIS — R94.31 QT PROLONGATION: ICD-10-CM

## 2018-06-10 DIAGNOSIS — I63.412 EMBOLIC STROKE INVOLVING LEFT MIDDLE CEREBRAL ARTERY: ICD-10-CM

## 2018-06-10 DIAGNOSIS — J44.1 COPD EXACERBATION: ICD-10-CM

## 2018-06-10 DIAGNOSIS — G93.6 CYTOTOXIC CEREBRAL EDEMA: ICD-10-CM

## 2018-06-10 DIAGNOSIS — E78.2 MIXED HYPERLIPIDEMIA: ICD-10-CM

## 2018-06-10 DIAGNOSIS — I10 ESSENTIAL HYPERTENSION: ICD-10-CM

## 2018-06-10 DIAGNOSIS — I63.9 STROKE: ICD-10-CM

## 2018-06-10 DIAGNOSIS — R13.19 ESOPHAGEAL DYSPHAGIA: ICD-10-CM

## 2018-06-10 PROBLEM — I63.411 EMBOLIC STROKE INVOLVING RIGHT MIDDLE CEREBRAL ARTERY: Status: ACTIVE | Noted: 2018-06-10

## 2018-06-10 LAB
ABO + RH BLD: NORMAL
ALBUMIN SERPL BCP-MCNC: 2.6 G/DL
ALLENS TEST: ABNORMAL
ALP SERPL-CCNC: 86 U/L
ALT SERPL W/O P-5'-P-CCNC: 15 U/L
ANION GAP SERPL CALC-SCNC: 6 MMOL/L
ANION GAP SERPL CALC-SCNC: 8 MMOL/L
AST SERPL-CCNC: 12 U/L
BASOPHILS # BLD AUTO: 0.02 K/UL
BASOPHILS # BLD AUTO: 0.04 K/UL
BASOPHILS NFR BLD: 0.2 %
BASOPHILS NFR BLD: 0.2 %
BILIRUB SERPL-MCNC: 0.4 MG/DL
BLD GP AB SCN CELLS X3 SERPL QL: NORMAL
BUN SERPL-MCNC: 41 MG/DL
BUN SERPL-MCNC: 47 MG/DL
CALCIUM SERPL-MCNC: 9.3 MG/DL
CALCIUM SERPL-MCNC: 9.3 MG/DL
CHLORIDE SERPL-SCNC: 104 MMOL/L
CHLORIDE SERPL-SCNC: 106 MMOL/L
CHOLEST SERPL-MCNC: 148 MG/DL
CHOLEST/HDLC SERPL: 3.1 {RATIO}
CO2 SERPL-SCNC: 25 MMOL/L
CO2 SERPL-SCNC: 30 MMOL/L
CREAT SERPL-MCNC: 1.6 MG/DL
CREAT SERPL-MCNC: 1.9 MG/DL
DELSYS: ABNORMAL
DIFFERENTIAL METHOD: ABNORMAL
DIFFERENTIAL METHOD: ABNORMAL
EOSINOPHIL # BLD AUTO: 0 K/UL
EOSINOPHIL # BLD AUTO: 0 K/UL
EOSINOPHIL NFR BLD: 0 %
EOSINOPHIL NFR BLD: 0 %
ERYTHROCYTE [DISTWIDTH] IN BLOOD BY AUTOMATED COUNT: 13.6 %
ERYTHROCYTE [DISTWIDTH] IN BLOOD BY AUTOMATED COUNT: 13.7 %
EST. GFR  (AFRICAN AMERICAN): 30 ML/MIN/1.73 M^2
EST. GFR  (AFRICAN AMERICAN): 37 ML/MIN/1.73 M^2
EST. GFR  (NON AFRICAN AMERICAN): 26 ML/MIN/1.73 M^2
EST. GFR  (NON AFRICAN AMERICAN): 32 ML/MIN/1.73 M^2
ESTIMATED AVG GLUCOSE: 255 MG/DL
FLOW: 3
GLUCOSE SERPL-MCNC: 132 MG/DL
GLUCOSE SERPL-MCNC: 219 MG/DL
HBA1C MFR BLD HPLC: 10.5 %
HCO3 UR-SCNC: 32.1 MMOL/L (ref 24–28)
HCT VFR BLD AUTO: 37.9 %
HCT VFR BLD AUTO: 40.3 %
HDLC SERPL-MCNC: 47 MG/DL
HDLC SERPL: 31.8 %
HGB BLD-MCNC: 11.3 G/DL
HGB BLD-MCNC: 12.7 G/DL
IMM GRANULOCYTES # BLD AUTO: 0.3 K/UL
IMM GRANULOCYTES NFR BLD AUTO: 1.8 %
INR PPP: 0.9
INR PPP: 1.1
LDLC SERPL CALC-MCNC: 83.4 MG/DL
LYMPHOCYTES # BLD AUTO: 0.8 K/UL
LYMPHOCYTES # BLD AUTO: 1 K/UL
LYMPHOCYTES NFR BLD: 5.8 %
LYMPHOCYTES NFR BLD: 5.9 %
MAGNESIUM SERPL-MCNC: 1.9 MG/DL
MCH RBC QN AUTO: 28.3 PG
MCH RBC QN AUTO: 30 PG
MCHC RBC AUTO-ENTMCNC: 29.8 G/DL
MCHC RBC AUTO-ENTMCNC: 31.5 G/DL
MCV RBC AUTO: 95 FL
MCV RBC AUTO: 95 FL
MODE: ABNORMAL
MONOCYTES # BLD AUTO: 0.5 K/UL
MONOCYTES # BLD AUTO: 0.5 K/UL
MONOCYTES NFR BLD: 3 %
MONOCYTES NFR BLD: 4 %
NEUTROPHILS # BLD AUTO: 11.9 K/UL
NEUTROPHILS # BLD AUTO: 15.3 K/UL
NEUTROPHILS NFR BLD: 89.2 %
NEUTROPHILS NFR BLD: 89.6 %
NONHDLC SERPL-MCNC: 101 MG/DL
NRBC BLD-RTO: 0 /100 WBC
PCO2 BLDA: 53.4 MMHG (ref 35–45)
PH SMN: 7.39 [PH] (ref 7.35–7.45)
PHOSPHATE SERPL-MCNC: 3.8 MG/DL
PLATELET # BLD AUTO: 215 K/UL
PLATELET # BLD AUTO: 245 K/UL
PMV BLD AUTO: 11.3 FL
PMV BLD AUTO: 11.6 FL
PO2 BLDA: 59 MMHG (ref 80–100)
POC BE: 7 MMOL/L
POC SATURATED O2: 89 % (ref 95–100)
POC TCO2: 34 MMOL/L (ref 23–27)
POCT GLUCOSE: 105 MG/DL (ref 70–110)
POCT GLUCOSE: 130 MG/DL (ref 70–110)
POCT GLUCOSE: 145 MG/DL (ref 70–110)
POCT GLUCOSE: 151 MG/DL (ref 70–110)
POTASSIUM SERPL-SCNC: 4.6 MMOL/L
POTASSIUM SERPL-SCNC: 4.7 MMOL/L
PROT SERPL-MCNC: 6.3 G/DL
PROTHROMBIN TIME: 10 SEC
PROTHROMBIN TIME: 11.5 SEC
RBC # BLD AUTO: 4 M/UL
RBC # BLD AUTO: 4.23 M/UL
SAMPLE: ABNORMAL
SITE: ABNORMAL
SODIUM SERPL-SCNC: 137 MMOL/L
SODIUM SERPL-SCNC: 142 MMOL/L
TRIGL SERPL-MCNC: 88 MG/DL
TROPONIN I SERPL DL<=0.01 NG/ML-MCNC: 0.02 NG/ML
TSH SERPL DL<=0.005 MIU/L-ACNC: 1.53 UIU/ML
WBC # BLD AUTO: 13.28 K/UL
WBC # BLD AUTO: 17.14 K/UL

## 2018-06-10 PROCEDURE — 80048 BASIC METABOLIC PNL TOTAL CA: CPT

## 2018-06-10 PROCEDURE — 80061 LIPID PANEL: CPT

## 2018-06-10 PROCEDURE — 36415 COLL VENOUS BLD VENIPUNCTURE: CPT

## 2018-06-10 PROCEDURE — 85610 PROTHROMBIN TIME: CPT | Mod: 91

## 2018-06-10 PROCEDURE — 83735 ASSAY OF MAGNESIUM: CPT

## 2018-06-10 PROCEDURE — 25500020 PHARM REV CODE 255: Performed by: PSYCHIATRY & NEUROLOGY

## 2018-06-10 PROCEDURE — A4216 STERILE WATER/SALINE, 10 ML: HCPCS | Performed by: STUDENT IN AN ORGANIZED HEALTH CARE EDUCATION/TRAINING PROGRAM

## 2018-06-10 PROCEDURE — G0378 HOSPITAL OBSERVATION PER HR: HCPCS

## 2018-06-10 PROCEDURE — 82803 BLOOD GASES ANY COMBINATION: CPT

## 2018-06-10 PROCEDURE — 86901 BLOOD TYPING SEROLOGIC RH(D): CPT

## 2018-06-10 PROCEDURE — 25000242 PHARM REV CODE 250 ALT 637 W/ HCPCS: Performed by: HOSPITALIST

## 2018-06-10 PROCEDURE — 63600175 PHARM REV CODE 636 W HCPCS

## 2018-06-10 PROCEDURE — 99223 1ST HOSP IP/OBS HIGH 75: CPT | Mod: ,,, | Performed by: PHYSICIAN ASSISTANT

## 2018-06-10 PROCEDURE — G0427 INPT/ED TELECONSULT70: HCPCS | Mod: GT,,, | Performed by: PSYCHIATRY & NEUROLOGY

## 2018-06-10 PROCEDURE — 63600175 PHARM REV CODE 636 W HCPCS: Performed by: NURSE PRACTITIONER

## 2018-06-10 PROCEDURE — 84484 ASSAY OF TROPONIN QUANT: CPT

## 2018-06-10 PROCEDURE — 85025 COMPLETE CBC W/AUTO DIFF WBC: CPT | Mod: 91

## 2018-06-10 PROCEDURE — 80053 COMPREHEN METABOLIC PANEL: CPT

## 2018-06-10 PROCEDURE — 20000000 HC ICU ROOM

## 2018-06-10 PROCEDURE — 25000003 PHARM REV CODE 250: Performed by: HOSPITALIST

## 2018-06-10 PROCEDURE — 25000003 PHARM REV CODE 250

## 2018-06-10 PROCEDURE — 84100 ASSAY OF PHOSPHORUS: CPT

## 2018-06-10 PROCEDURE — 27000221 HC OXYGEN, UP TO 24 HOURS

## 2018-06-10 PROCEDURE — 93010 ELECTROCARDIOGRAM REPORT: CPT | Mod: ,,, | Performed by: INTERNAL MEDICINE

## 2018-06-10 PROCEDURE — 93005 ELECTROCARDIOGRAM TRACING: CPT

## 2018-06-10 PROCEDURE — 85025 COMPLETE CBC W/AUTO DIFF WBC: CPT

## 2018-06-10 PROCEDURE — 85610 PROTHROMBIN TIME: CPT

## 2018-06-10 PROCEDURE — 83036 HEMOGLOBIN GLYCOSYLATED A1C: CPT

## 2018-06-10 PROCEDURE — 25000003 PHARM REV CODE 250: Performed by: STUDENT IN AN ORGANIZED HEALTH CARE EDUCATION/TRAINING PROGRAM

## 2018-06-10 PROCEDURE — 25000003 PHARM REV CODE 250: Performed by: NURSE PRACTITIONER

## 2018-06-10 PROCEDURE — 25000003 PHARM REV CODE 250: Performed by: PHYSICIAN ASSISTANT

## 2018-06-10 PROCEDURE — 84443 ASSAY THYROID STIM HORMONE: CPT

## 2018-06-10 PROCEDURE — 36600 WITHDRAWAL OF ARTERIAL BLOOD: CPT

## 2018-06-10 PROCEDURE — 63600175 PHARM REV CODE 636 W HCPCS: Performed by: PHYSICIAN ASSISTANT

## 2018-06-10 PROCEDURE — 63600175 PHARM REV CODE 636 W HCPCS: Mod: JG | Performed by: HOSPITALIST

## 2018-06-10 PROCEDURE — 94761 N-INVAS EAR/PLS OXIMETRY MLT: CPT

## 2018-06-10 PROCEDURE — 94640 AIRWAY INHALATION TREATMENT: CPT

## 2018-06-10 PROCEDURE — 99223 1ST HOSP IP/OBS HIGH 75: CPT | Mod: GC,,, | Performed by: NURSE PRACTITIONER

## 2018-06-10 RX ORDER — SODIUM,POTASSIUM PHOSPHATES 280-250MG
2 POWDER IN PACKET (EA) ORAL
Status: DISCONTINUED | OUTPATIENT
Start: 2018-06-10 | End: 2018-06-11

## 2018-06-10 RX ORDER — CARVEDILOL 6.25 MG/1
6.25 TABLET ORAL 2 TIMES DAILY
Status: DISCONTINUED | OUTPATIENT
Start: 2018-06-10 | End: 2018-06-10 | Stop reason: HOSPADM

## 2018-06-10 RX ORDER — LANOLIN ALCOHOL/MO/W.PET/CERES
800 CREAM (GRAM) TOPICAL
Status: DISCONTINUED | OUTPATIENT
Start: 2018-06-10 | End: 2018-06-11

## 2018-06-10 RX ORDER — ACETAMINOPHEN 325 MG/1
650 TABLET ORAL EVERY 6 HOURS PRN
Status: DISCONTINUED | OUTPATIENT
Start: 2018-06-10 | End: 2018-06-14 | Stop reason: HOSPADM

## 2018-06-10 RX ORDER — ONDANSETRON 8 MG/1
8 TABLET, ORALLY DISINTEGRATING ORAL EVERY 8 HOURS PRN
Status: DISCONTINUED | OUTPATIENT
Start: 2018-06-10 | End: 2018-06-11

## 2018-06-10 RX ORDER — PANTOPRAZOLE SODIUM 40 MG/1
40 TABLET, DELAYED RELEASE ORAL DAILY
Status: ON HOLD
Start: 2018-06-11 | End: 2018-06-14 | Stop reason: HOSPADM

## 2018-06-10 RX ORDER — HYDRALAZINE HYDROCHLORIDE 20 MG/ML
INJECTION INTRAMUSCULAR; INTRAVENOUS
Status: COMPLETED
Start: 2018-06-10 | End: 2018-06-10

## 2018-06-10 RX ORDER — BACLOFEN 20 MG/1
20 TABLET ORAL 3 TIMES DAILY
Qty: 90 TABLET | Refills: 11 | Status: ON HOLD | OUTPATIENT
Start: 2018-06-10 | End: 2019-01-23 | Stop reason: HOSPADM

## 2018-06-10 RX ORDER — OXYBUTYNIN CHLORIDE 5 MG/1
5 TABLET ORAL 2 TIMES DAILY
Qty: 60 TABLET | Refills: 11
Start: 2018-06-10 | End: 2019-06-10

## 2018-06-10 RX ORDER — HALOPERIDOL 5 MG/ML
5 INJECTION INTRAMUSCULAR EVERY 6 HOURS PRN
Status: DISCONTINUED | OUTPATIENT
Start: 2018-06-10 | End: 2018-06-10 | Stop reason: HOSPADM

## 2018-06-10 RX ORDER — BUPROPION HYDROCHLORIDE 100 MG/1
100 TABLET, EXTENDED RELEASE ORAL 2 TIMES DAILY
Qty: 60 TABLET | Refills: 11
Start: 2018-06-10 | End: 2019-06-10

## 2018-06-10 RX ORDER — IPRATROPIUM BROMIDE AND ALBUTEROL SULFATE 2.5; .5 MG/3ML; MG/3ML
3 SOLUTION RESPIRATORY (INHALATION) EVERY 4 HOURS
Refills: 0
Start: 2018-06-10 | End: 2019-06-10

## 2018-06-10 RX ORDER — FAMOTIDINE 20 MG/1
20 TABLET, FILM COATED ORAL DAILY
Start: 2018-06-11 | End: 2019-06-11

## 2018-06-10 RX ORDER — SODIUM CHLORIDE 9 MG/ML
50 INJECTION, SOLUTION INTRAVENOUS ONCE
Status: COMPLETED | OUTPATIENT
Start: 2018-06-10 | End: 2018-06-10

## 2018-06-10 RX ORDER — LABETALOL HYDROCHLORIDE 5 MG/ML
10 INJECTION, SOLUTION INTRAVENOUS EVERY 4 HOURS PRN
Status: DISCONTINUED | OUTPATIENT
Start: 2018-06-10 | End: 2018-06-10 | Stop reason: HOSPADM

## 2018-06-10 RX ORDER — BENZONATATE 100 MG/1
100 CAPSULE ORAL 3 TIMES DAILY PRN
Start: 2018-06-10 | End: 2018-06-20

## 2018-06-10 RX ORDER — HYDRALAZINE HYDROCHLORIDE 20 MG/ML
20 INJECTION INTRAMUSCULAR; INTRAVENOUS ONCE
Status: COMPLETED | OUTPATIENT
Start: 2018-06-10 | End: 2018-06-10

## 2018-06-10 RX ORDER — NICARDIPINE HYDROCHLORIDE 0.2 MG/ML
INJECTION INTRAVENOUS
Status: COMPLETED
Start: 2018-06-10 | End: 2018-06-10

## 2018-06-10 RX ORDER — AMOXICILLIN 250 MG
1 CAPSULE ORAL DAILY
Status: DISCONTINUED | OUTPATIENT
Start: 2018-06-11 | End: 2018-06-11

## 2018-06-10 RX ORDER — SODIUM CHLORIDE 0.9 % (FLUSH) 0.9 %
3 SYRINGE (ML) INJECTION EVERY 8 HOURS
Status: DISCONTINUED | OUTPATIENT
Start: 2018-06-10 | End: 2018-06-14 | Stop reason: HOSPADM

## 2018-06-10 RX ORDER — LABETALOL HYDROCHLORIDE 5 MG/ML
10 INJECTION, SOLUTION INTRAVENOUS
Status: DISCONTINUED | OUTPATIENT
Start: 2018-06-10 | End: 2018-06-14 | Stop reason: HOSPADM

## 2018-06-10 RX ORDER — ATORVASTATIN CALCIUM 20 MG/1
40 TABLET, FILM COATED ORAL DAILY
Status: DISCONTINUED | OUTPATIENT
Start: 2018-06-11 | End: 2018-06-11

## 2018-06-10 RX ORDER — POTASSIUM CHLORIDE 20 MEQ/15ML
60 SOLUTION ORAL
Status: DISCONTINUED | OUTPATIENT
Start: 2018-06-10 | End: 2018-06-11

## 2018-06-10 RX ORDER — POTASSIUM CHLORIDE 20 MEQ/15ML
40 SOLUTION ORAL
Status: DISCONTINUED | OUTPATIENT
Start: 2018-06-10 | End: 2018-06-11

## 2018-06-10 RX ORDER — SODIUM CHLORIDE 9 MG/ML
INJECTION, SOLUTION INTRAVENOUS CONTINUOUS
Status: DISCONTINUED | OUTPATIENT
Start: 2018-06-10 | End: 2018-06-12

## 2018-06-10 RX ORDER — NICARDIPINE HYDROCHLORIDE 0.2 MG/ML
5 INJECTION INTRAVENOUS CONTINUOUS
Status: DISCONTINUED | OUTPATIENT
Start: 2018-06-10 | End: 2018-06-12

## 2018-06-10 RX ADMIN — GUAIFENESIN AND DEXTROMETHORPHAN HYDROBROMIDE 1 TABLET: 600; 30 TABLET, EXTENDED RELEASE ORAL at 10:06

## 2018-06-10 RX ADMIN — SODIUM CHLORIDE: 0.9 INJECTION, SOLUTION INTRAVENOUS at 08:06

## 2018-06-10 RX ADMIN — HYDRALAZINE HYDROCHLORIDE 20 MG: 20 INJECTION INTRAMUSCULAR; INTRAVENOUS at 05:06

## 2018-06-10 RX ADMIN — BUPROPION HYDROCHLORIDE 100 MG: 100 TABLET, FILM COATED, EXTENDED RELEASE ORAL at 10:06

## 2018-06-10 RX ADMIN — Medication 3 ML: at 10:06

## 2018-06-10 RX ADMIN — OXYBUTYNIN CHLORIDE 5 MG: 5 TABLET ORAL at 09:06

## 2018-06-10 RX ADMIN — HALOPERIDOL LACTATE 5 MG: 5 INJECTION, SOLUTION INTRAMUSCULAR at 01:06

## 2018-06-10 RX ADMIN — NICARDIPINE HYDROCHLORIDE 5 MG/HR: 0.2 INJECTION, SOLUTION INTRAVENOUS at 09:06

## 2018-06-10 RX ADMIN — LABETALOL HYDROCHLORIDE 10 MG: 5 INJECTION, SOLUTION INTRAVENOUS at 05:06

## 2018-06-10 RX ADMIN — ALTEPLASE 81 MG: KIT at 02:06

## 2018-06-10 RX ADMIN — AMLODIPINE BESYLATE 10 MG: 5 TABLET ORAL at 10:06

## 2018-06-10 RX ADMIN — IPRATROPIUM BROMIDE AND ALBUTEROL SULFATE 3 ML: .5; 3 SOLUTION RESPIRATORY (INHALATION) at 04:06

## 2018-06-10 RX ADMIN — BACLOFEN 20 MG: 10 TABLET ORAL at 10:06

## 2018-06-10 RX ADMIN — IPRATROPIUM BROMIDE AND ALBUTEROL SULFATE 3 ML: .5; 3 SOLUTION RESPIRATORY (INHALATION) at 12:06

## 2018-06-10 RX ADMIN — FAMOTIDINE 20 MG: 20 TABLET, FILM COATED ORAL at 10:06

## 2018-06-10 RX ADMIN — IOHEXOL 100 ML: 350 INJECTION, SOLUTION INTRAVENOUS at 07:06

## 2018-06-10 RX ADMIN — PANTOPRAZOLE SODIUM 40 MG: 40 TABLET, DELAYED RELEASE ORAL at 10:06

## 2018-06-10 RX ADMIN — IPRATROPIUM BROMIDE AND ALBUTEROL SULFATE 3 ML: .5; 3 SOLUTION RESPIRATORY (INHALATION) at 05:06

## 2018-06-10 RX ADMIN — CARVEDILOL 6.25 MG: 6.25 TABLET, FILM COATED ORAL at 10:06

## 2018-06-10 RX ADMIN — PREDNISONE 40 MG: 20 TABLET ORAL at 10:06

## 2018-06-10 RX ADMIN — IPRATROPIUM BROMIDE AND ALBUTEROL SULFATE 3 ML: .5; 3 SOLUTION RESPIRATORY (INHALATION) at 08:06

## 2018-06-10 RX ADMIN — SODIUM CHLORIDE 50 ML: 0.9 INJECTION, SOLUTION INTRAVENOUS at 04:06

## 2018-06-10 RX ADMIN — ALTEPLASE 9 MG: KIT at 02:06

## 2018-06-10 NOTE — PROGRESS NOTES
[facility] Hospital Medicine  Progress Note    Patient Name: Franklin Christian  MRN: [id number]  Patient Class: OP- Observation   Admission Date: 6/7/2018  Length of Stay: 1 days  Attending Physician: Chase Finch MD  Primary Care Provider: Rom Jacques MD        Subjective:     Principal Problem:Acute asthma exacerbation    HPI:  Ms. Christian is a 73 yo  female with COPD, DM 2, HTN, hx MI, and CKD 3 who presents today with SOB. She reports she has been having difficulty breathing for one week, accompanied by abdominal pain, chest pain, and productive cough. She reports burning with urination as well. The morning of 6/7 she awoke with severe SOB that was not relieved with her inhaler. She presented to the ED and was found to have a BNP of 873 and pulmonary vascular congestion on CXR. She was given 40 mg IV lasix, duonebs, and solumedrol, and placed on supplemental oxygen. She was admitted to hospital medicine.   Other notable complaint includes 1 month of vaginal bleeding. She has a gynecologist appt on June 20. She reports that she previously had uterine cancer of some sort.   Ms. Christian lives at home. She has oxygen at home but it belongs to someone else. She uses a CPAP.     Hospital Course:  Pt remains hypoxic on supplemental O2, SpO2 92-94% past 12 hours. BG increased over 600; . Rise in creatinine to 2.1 so lasix d/kandi. Seen by PT/OT d/t marked debility and recommend SNF placement.     Interval History: Pt resting in bed, daughter Sharif at bedside. Discussed pt's status w daughter. Daughter did not know pt was going to SNF. She would like pt to go to SNF in [place] where she and her daughter live. Discussed pt's anxiety w daughter, she agrees it is likely a factor. Both her sons are in alf and this has caused her a lot of stress, and thinking about it gives her the sensation of not being able to breathe.     Review of Systems   Respiratory: Positive for cough and  shortness of breath.    Cardiovascular: Negative for chest pain.     Objective:     Vital Signs (Most Recent):  Temp: 97 °F (36.1 °C) (06/09/18 2036)  Pulse: 80 (06/09/18 2036)  Resp: (!) 22 (06/09/18 2036)  BP: (!) 155/78 (06/09/18 2036)  SpO2: (!) 92 % (06/09/18 1950) Vital Signs (24h Range):  Temp:  [96.9 °F (36.1 °C)-98 °F (36.7 °C)] 97 °F (36.1 °C)  Pulse:  [64-82] 80  Resp:  [16-22] 22  SpO2:  [91 %-97 %] 92 %  BP: (155-199)/() 155/78     Weight: 109.3 kg (240 lb 15.4 oz)  Body mass index is 44.07 kg/m².    Intake/Output Summary (Last 24 hours) at 06/09/18 2056  Last data filed at 06/09/18 1700   Gross per 24 hour   Intake              596 ml   Output                0 ml   Net              596 ml      Physical Exam   Cardiovascular: Normal rate and regular rhythm.    Pulmonary/Chest: Effort normal and breath sounds normal.   Cough   Abdominal: Soft. There is no tenderness.       Significant Labs:   BMP:   Recent Labs  Lab 06/09/18  0405   *      K 4.5      CO2 28   BUN 54*   CREATININE 2.4*   CALCIUM 9.1   MG 1.8     CBC: No results for input(s): WBC, HGB, HCT, PLT in the last 48 hours.    Significant Imaging: no new    Assessment/Plan:      * Acute asthma exacerbation    SOB (shortness of breath)  Pt with one week history of SOB and productive cough. Former smoker. Some wheezing on decreased breath sounds on exam. Remains on O2.   Continue scheduled duonebs, prednisone 40. Supplemental oxygen. May need home oxygen.         Pulmonary edema    Elevated serum creatinine  CXR showing bilateral interstitial infiltrates diffusely involving lungs w new limited pulmonary vascular congestion. Pt with cardiac history and s/p pacemaker placement. Echo showing normal EF, indeterminate LV diastolic dysfunction, and PA pressure 27. No edema visible on exam. Cr elevated to 2.1 today, likely d/t lasix. Will hold.   Hold lasix 40 mg daily. Continue home amlodipine, adding clonidine 0.1 TID.  Supplemental oxygen.         Type 2 diabetes mellitus, with long-term current use of insulin     - 680. A1C 10.1.   Check blood glucose AC and HS and use SSI.  Diabetic diet. Home insulin appears to be 65u before breakfast and 55 u before dinner. Will order 40 units levemir BID. Monitor closely while on steroids.        Essential hypertension    Home meds appear to be amlodipine 10 mg daily, lasix 40 mg daily, lisinopril 10 mg daily.  Continue amlodipine but hold lasix and lisinopril for elevated Cr.         Debility    PT/OT consulted d/t pt's reported inability to care for herself. Recommend SNF.             VTE Risk Mitigation         Ordered     enoxaparin injection 30 mg  Daily      06/08/18 1547     IP VTE HIGH RISK PATIENT  Once      06/07/18 1037              Carissa Barney PA-C  Department of Hospital Medicine   [facility]

## 2018-06-10 NOTE — ASSESSMENT & PLAN NOTE
Antithrombotics for secondary stroke prevention: Antiplatelets: None: Hold all Antithrombotics x 24 hours after IV t-PA administration    Statins for secondary stroke prevention and hyperlipidemia, if present:   Statins: Atorvastatin- 40 mg daily    Aggressive risk factor modification: HTN, Obesity     Rehab efforts: Occupational Therapy, PT/OT/SLP to evaluate and treat, PM&R consult     Diagnostics ordered/pending: CTA Head to assess vasculature , HgbA1C to assess blood glucose levels, Lipid Profile to assess cholesterol levels, MRI head without contrast to assess brain parenchyma    VTE prophylaxis: None: Reason for No Pharmacological VTE Prophylaxis: Mechanical prophylaxis: Place SCDs    BP parameters: Infarct: Post tPA, SBP <180

## 2018-06-10 NOTE — PROGRESS NOTES
Ref Range & Units 12:51   POC PH 7.35 - 7.45 7.387    POC PCO2 35 - 45 mmHg 53.4     POC PO2 80 - 100 mmHg 59     POC HCO3 24 - 28 mmol/L 32.1     POC BE -2 to 2 mmol/L 7    POC SATURATED O2 95 - 100 % 89     POC TCO2 23 - 27 mmol/L 34     Sample  ARTERIAL    Site  LR    Allens Test  Pass    DelSys  Nasal Can    Mode  SPONT    Flow  3

## 2018-06-10 NOTE — PLAN OF CARE
Nurse transferred pt to ICU w/ICU charge nurse.  Pt transferred on telemetry w/O2.  Pt stable.  Report given to MCKAY Eaton at bedside.  Nurse verbalized understanding.  MCKAY Eaton to assume care of pt.

## 2018-06-10 NOTE — PLAN OF CARE
From MADELEINE Verduzco's note:    Plan:  Give tPA and transfer to [facility]    [place] contacted [facility] [phone], to check on the status of the transfer, Pat stated that the pt will be coming in through the ED there, they have ordered a STAT ambulance to transfer pt from [facility] to [facility]       06/10/18 2693   Final Note   Assessment Type Final Discharge Note   Discharge Disposition Other Fac [place]  (pt transfering to [facility])   What phone number can be called within the next 1-3 days to see how you are doing after discharge? [phone]   Hospital Follow Up  Appt(s) scheduled? No  (Transfer to [facility])   Right Care Referral Info   Post Acute Recommendation Other  (Transfer to [facility])     Irene Rivera, RN Transitional Navigator  [phone]

## 2018-06-10 NOTE — NURSING
Pt being escorted off the unit via stretcher with two parametrics. Vitals as follows /77, HR 70 Resp 34. Last set of vitals taken after patient assisted with being transferred to stretcher. Pt denies pain. Pt's family members on bedside and will follow ambulance to [facility].

## 2018-06-10 NOTE — PLAN OF CARE
PA and nurse at bedside.  Pt hard to arouse.  PA assessed pt pupils and noticed Rt pupil < Lt pupil.  Pt unable to answer questions and was not responding to pain stimuli; therefore, code stroke initiated.      1241:  Code Stroke initiated.  VS taken (see flowsheet).  EKG, ABGs ordered.  Dr. Finch at bedside.      1245:  /70 HR 66.  CT and Chest xray ordered.  Tele-stroke consult placed.  ABGs done.  EKG done.     1250:  Roll out to CT.    1300:  Labs drawn.    1325:  Tele-stroke MD at bedside to assess pt.      1345:  TPA recommended and pt daughter agreed to treatment.    1350:  Pt transferred to ICU.  Daughter at bedside has all pt's belongings including keys, cell phone, and bible.

## 2018-06-10 NOTE — ASSESSMENT & PLAN NOTE
- 680. A1C 10.1.   Check blood glucose AC and HS and use SSI.  Diabetic diet. Home insulin appears to be 65u before breakfast and 55 u before dinner. Will order 40 units levemir BID. Monitor closely while on steroids.

## 2018-06-10 NOTE — ASSESSMENT & PLAN NOTE
Elevated serum creatinine  CXR showing bilateral interstitial infiltrates diffusely involving lungs w new limited pulmonary vascular congestion. Pt with cardiac history and s/p pacemaker placement. Echo showing normal EF, indeterminate LV diastolic dysfunction, and PA pressure 27. No edema visible on exam. Cr elevated to 2.1 today, likely d/t lasix. Will hold.   Hold lasix 40 mg daily. Continue home amlodipine, adding clonidine 0.1 TID. Supplemental oxygen.

## 2018-06-10 NOTE — PLAN OF CARE
Plan of care reviewed with patient, understanding verbalized.  Pt remains SR on tele. Bed alarm on, call light in reach, fall precautions in place. Report given to oncoming nurse.

## 2018-06-10 NOTE — SIGNIFICANT EVENT
CODE STROKE DOCUMENTATION  [facility]   PROVIDER LEAD    Code Stroke called: 1245  Time arrived to pt's room: 1240    Reason for Code Stroke as reported by bedside nurse: AMS, anisocoria  Time last seen normal (less than or equal to 3 hours = inclusion criteria for tPA): 1215    Past Medical History:   Diagnosis Date    Diabetes mellitus     Hypertension     MI, old     Renal disorder        Condition of patient on my arrival: Pt was disoriented and obtunded. Difficult to arouse. Initial assessment included pupillary reflex, which showed L>R with less reaction on the left. Asked Citizen of [place] speaking nurse Giulia for assistance; pt was confused, garbled speech. Called code stroke.    Blood pressure (SBP > 185 or DBP > 110 excludes for tPA):   BP Readings from Last 3 Encounters:   06/10/18 (!) 150/67   11/11/17 (!) 174/77   10/11/17 (!) 195/81     Pulse Readings from Last 3 Encounters:   06/10/18 72   11/11/17 63   10/11/17 65     Wt Readings from Last 3 Encounters:   06/10/18 112.1 kg (247 lb 2.2 oz)   11/11/17 99.8 kg (220 lb)   10/11/17 99.8 kg (220 lb)       Blood glucose (>400 excludes for tPA): 130    EKG: ventricular paced    CT head: Inferior left frontal lobe more conspicuous area of hypoattenuation without significant volume loss, which may be related to artifact versus acute/subacute infarct.     PT > 15 seconds is contraindication (Do not collect if don't expect to use tPA): 10.0  INR > 1.7 is contraindication (Do not collect if don't expect to use tPA): 0.9  Creatinine: 1.6    AM labs reviewed:  - Platelet count (< 100K excludes for tPA): 215    Time of last anticoagulant (< 24 hours excludes for tPA): 1700 6/9    Vascular Neurologist via telemedicine: Dr. Leyva    Impression:   Likely ischemic stroke     Plan:  Give tPA and transfer to [facility]    Code Stroke ended: 1345    Critical care time spent on the evaluation and treatment of severe organ dysfunction, review of pertinent labs  and imaging studies, discussions with consulting providers and discussions with patient/family: 30 minutes.    Carissa Barney PA-C  [facility]  Pager: [phone]

## 2018-06-10 NOTE — PLAN OF CARE
Patient continues to be anxious and is acting strangely.  She told REYNA Wilkinson that she doesn't want people to be mean to him.  Patients pressure is also 182/77 and she does not have PRNs.  GLYNN Treadwell NP notified. Orders for PRN haloperidol and PRN labetalol received.  Will continue to monitor.

## 2018-06-10 NOTE — ASSESSMENT & PLAN NOTE
SOB (shortness of breath)  Pt with one week history of SOB and productive cough. Former smoker. Some wheezing on decreased breath sounds on exam. Remains on O2.   Continue scheduled duonebs, prednisone 40. Supplemental oxygen. May need home oxygen.

## 2018-06-10 NOTE — PLAN OF CARE
Problem: Patient Care Overview  Goal: Plan of Care Review  Outcome: Ongoing (interventions implemented as appropriate)  Pt's SpO2 92% on 2 lpm NC. No respiratory distress noted. Will continue to monitor SpO2.

## 2018-06-10 NOTE — PLAN OF CARE
Respiratory notified nurse of pt not looking well.  Nurse at bedside w/Giulia RN (Malay speaking nurse) to assess pt.  Pt difficult to arouse.  Pt c/o SOB.  SpO2 taken (88%).  3L NC applied.  SpO2 92%.  VSS (see flowsheet).  Nurse notified MADELEINE Verduzco.  PA on her way to assess pt.  Respiratory administering breathing treatment at this time.

## 2018-06-10 NOTE — SUBJECTIVE & OBJECTIVE
Interval History: Pt resting in bed, daughter Sharif at bedside. Discussed pt's status w daughter. Daughter did not know pt was going to SNF. She would like pt to go to SNF in [place] where she and her daughter live. Discussed pt's anxiety w daughter, she agrees it is likely a factor. Both her sons are in long-term and this has caused her a lot of stress, and thinking about it gives her the sensation of not being able to breathe.     Review of Systems   Respiratory: Positive for cough and shortness of breath.    Cardiovascular: Negative for chest pain.     Objective:     Vital Signs (Most Recent):  Temp: 97 °F (36.1 °C) (06/09/18 2036)  Pulse: 80 (06/09/18 2036)  Resp: (!) 22 (06/09/18 2036)  BP: (!) 155/78 (06/09/18 2036)  SpO2: (!) 92 % (06/09/18 1950) Vital Signs (24h Range):  Temp:  [96.9 °F (36.1 °C)-98 °F (36.7 °C)] 97 °F (36.1 °C)  Pulse:  [64-82] 80  Resp:  [16-22] 22  SpO2:  [91 %-97 %] 92 %  BP: (155-199)/() 155/78     Weight: 109.3 kg (240 lb 15.4 oz)  Body mass index is 44.07 kg/m².    Intake/Output Summary (Last 24 hours) at 06/09/18 2056  Last data filed at 06/09/18 1700   Gross per 24 hour   Intake              596 ml   Output                0 ml   Net              596 ml      Physical Exam   Cardiovascular: Normal rate and regular rhythm.    Pulmonary/Chest: Effort normal and breath sounds normal.   Cough   Abdominal: Soft. There is no tenderness.       Significant Labs:   BMP:   Recent Labs  Lab 06/09/18  0405   *      K 4.5      CO2 28   BUN 54*   CREATININE 2.4*   CALCIUM 9.1   MG 1.8     CBC: No results for input(s): WBC, HGB, HCT, PLT in the last 48 hours.    Significant Imaging: no new

## 2018-06-10 NOTE — HPI
CAMILLE 1215pm. telestroke called due to anisocoria following duonebs. Preceded by episode of hypoxia which improved with supplemental oxygen. Reported development of altered mental status with decreased responsiveness.

## 2018-06-10 NOTE — PLAN OF CARE
While giving night time medication patient appeared to be anxious. Patient began hitting herself in the head and speaking in U[place]. Filipino speaking REYNA Garberar was called to bedside. Patient told him that she was hallucinating. She said that his hair color had changed to blonde and that when people come in her room they their appearance changes. Notified MADELEINE Ferrer.  Ordered to notify of any further changes or of patient continues to be anxious.

## 2018-06-10 NOTE — PROGRESS NOTES
RT went to give breathing treatment to patient who was on RA; 88%.   Put patient on 3 lpm NC- 92% pretreatment.   Patient's eyes were rolling; had trouble lifting her arms, waving them. Could not talk/ properly respond even though was speaking to Nurse jae about 10 minutes later.   Went outside to find somebody.   Nurse and doctor came to examine patient.   Nurse decided to call code stroke.   spo2 after treatment- 96%. Put back on 3 lpm NC.     ABG performed by Marsha, EKG performed by Joanna.   Patient already on way to CT. Will continue to monitor.

## 2018-06-10 NOTE — PROGRESS NOTES
06/10/18 1715   Vital Signs   Pulse 74   Resp 20   SpO2 95 %   BP (!) 192/81   MAP (mmHg) 117   10 mg Labetalol given IV.

## 2018-06-10 NOTE — CONSULTS
[facility]  Vascular Neurology  Comprehensive Stroke Center  Tele-Consultation Note      Consults    Consulting Provider:    Current Providers  No providers found      IP Unit  Spoke hospital nurse at bedside with patient assisting consultant.     Patient information was obtained from relative(s).       Assessment/Plan:     STROKE DOCUMENTATION     Acute Stroke Times:   Acute Stroke Times   Last Known Normal Date: 06/10/18  Last Known Normal Time: 1215  CT Interpretation Time: 1325    NIH Scale:  Interval: baseline (upon arrival/admit)  1a. Level Of Consciousness: 0-->Alert: keenly responsive  1b. LOC Questions: 0-->Answers both questions correctly  1c. LOC Commands: 0-->Performs both tasks correctly  2. Best Gaze: 0-->Normal  3. Visual: 0-->No visual loss  4. Facial Palsy: 0-->Normal symmetrical movements  5a. Motor Arm, Left: 1-->Drift: limb holds 90 (or 45) degrees, but drifts down before full 10 seconds: does not hit bed or other support  5b. Motor Arm, Right: 0-->No drift: limb holds 90 (or 45) degrees for full 10 secs  6a. Motor Leg, Left: 2-->Some effort against gravity: leg falls to bed by 5 secs, but has some effort against gravity  6b. Motor Leg, Right: 1-->Drift: leg falls by the end of the 5-sec period but does not hit bed  7. Limb Ataxia: 0-->Absent  8. Sensory: 2-->Severe to total sensory loss: patient is not aware of being touched in the face, arm, and leg  9. Best Language: 0-->No aphasia: normal  10. Dysarthria: 1-->Mild-to-moderate dysarthria: patient slurs at least some words and, at worst, can be understood with some difficulty  11. Extinction and Inattention (formerly Neglect): 0-->No abnormality  Total (NIH Stroke Scale): 7     Modified [place] Coma Scale:    ABCD2 Score:    [id number] Score:   HAS -BLED Score:   ICH Score:   Hunt & Diehl Classification:       Diagnoses:   Stroke      Antithrombotics for secondary stroke prevention:  "Antiplatelets: None: Hold all Antithrombotics x 24 hours after IV t-PA administration    Statins for secondary stroke prevention and hyperlipidemia, if present:   Statins: Atorvastatin- 40 mg daily    Aggressive risk factor modification: HTN, Obesity     Rehab efforts: Occupational Therapy, PT/OT/SLP to evaluate and treat, PM&R consult     Diagnostics ordered/pending: CTA Head to assess vasculature , HgbA1C to assess blood glucose levels, Lipid Profile to assess cholesterol levels, MRI head without contrast to assess brain parenchyma    VTE prophylaxis: None: Reason for No Pharmacological VTE Prophylaxis: Mechanical prophylaxis: Place SCDs    BP parameters: Infarct: Post tPA, SBP <180                Blood pressure (!) 150/67, pulse 72, temperature 98.4 °F (36.9 °C), temperature source Oral, resp. rate (!) 22, height 5' 2" (1.575 m), weight 112.1 kg (247 lb 2.2 oz), SpO2 (!) 92 %, not currently breastfeeding.  Alteplase Eligible?: Yes  Alteplase Recommendation:   Alteplase Total Dose:   Total dose: Alteplase 0.9mg/kg (max dose:90mg)                      ** based on acquired weight from facility   Bolus Dose:   10% of total Alteplase dose given intravenously over 1 minute   Continuous Infusion Dose:   Remaining 90% of total Alteplase dose infused intravenously over 60 minutes    **infusion must start at the same time as the bolus dose     Additional Recommendations:   1. Neurological assessment and vital signs (except temperature) every 15 minutes during Altaplase infusion.  2. Frequency of BP assessments may need to be increased if systolic BP stays >= 180 mm Hg or diastolic BP stays >= 105 mm Hg. Administer antihypertensive meds as ordered  3. Continue to monitor and control blood pressure and monitor for neurological deterioration every 15 minutes for the first hour after the infusion is stopped. Then every 30 minutes for the next 6 hours. Perform hourly monitoring from the 8th post-infusion hour until 24 hours " post-infusion.  4. Temperature every 4 hours or as required.  5. Follow hospital protocol for further orders re: post tPA infusion patient management.  6. No antithrombotics or anticoagulants (including but not limited to: heparin, warfarin, aspirin, clopidigrel, or dipyridamole) for 24 hours, then start antithrombotics as ordered by treating physician    Adapted from the American Heart Association/American Stroke Association (AHA/ASA) and American Association of Neuroscience Nurses (AANN) Guidelines.   Possible Interventional Revascularization Candidate? Yes    Disposition Recommendation: transfer to [facility] by  ground  stat      Subjective:     History of Present Illness:  LKN 1215pm. telestroke called due to anisocoria following duonebs. Preceded by episode of hypoxia which improved with supplemental oxygen. Reported development of altered mental status with decreased responsiveness.     No new subjective & objective note has been filed under this hospital service since the last note was generated.      Recommended the emergency room physician to have a brief discussion with the patient and/or family if available regarding the risks and benefits of treatment, and to briefly document the occurrence of that discussion in his clinical encounter note.     The attending portion of this evaluation, treatment, and documentation was performed per Valeriano Khalil MD via audiovisual.    Billing code:  (time dependent stroke, complex case, unstable patient, hemorrhages, any intervention, some mimics)    · This patient has a very critical neurological condition/illness, with very high morbidity and mortality.  · There is a very high probability for acute neurological change leading to clinical and possibly life-threatening deterioration requiring highest level of physician preparedness for urgent intervention.  · There is possibility that this condition will require treatment with high risk medications  as quickly as possible.  · There is also a possibility that the patient may benefit from further, more advance complex therapies (e.g. endovascular therapy) that will require prompt diagnosis and care.  · Care was coordinated with other physicians involved in the patient's care.  · Radiologic studies and laboratory data were reviewed and interpreted, and plan of care was re-assessed based on the results.  · Diagnosis, treatment options and prognosis may have been discussed with the patient and/or family members or caregiver.  · Further advanced medical management and further evaluation is warranted for his care.      Consult End Time: 1:37 PM     Valeriano Khalil MD  Comprehensive Stroke Center  Vascular Neurology   [facility]

## 2018-06-11 PROBLEM — G93.6 CYTOTOXIC CEREBRAL EDEMA: Status: ACTIVE | Noted: 2018-06-11

## 2018-06-11 PROBLEM — J44.1 COPD EXACERBATION: Status: ACTIVE | Noted: 2018-06-07

## 2018-06-11 PROBLEM — R13.19 ESOPHAGEAL DYSPHAGIA: Status: ACTIVE | Noted: 2018-06-11

## 2018-06-11 PROBLEM — R91.1 PULMONARY NODULE: Status: ACTIVE | Noted: 2018-06-11

## 2018-06-11 PROBLEM — E78.2 MIXED HYPERLIPIDEMIA: Status: ACTIVE | Noted: 2018-06-11

## 2018-06-11 LAB
ALBUMIN SERPL BCP-MCNC: 2.6 G/DL
ALP SERPL-CCNC: 93 U/L
ALT SERPL W/O P-5'-P-CCNC: 14 U/L
ANION GAP SERPL CALC-SCNC: 11 MMOL/L
AST SERPL-CCNC: 13 U/L
BACTERIA UR CULT: NORMAL
BASOPHILS # BLD AUTO: 0.06 K/UL
BASOPHILS NFR BLD: 0.3 %
BILIRUB SERPL-MCNC: 0.5 MG/DL
BUN SERPL-MCNC: 35 MG/DL
CALCIUM SERPL-MCNC: 9 MG/DL
CHLORIDE SERPL-SCNC: 106 MMOL/L
CO2 SERPL-SCNC: 27 MMOL/L
CREAT SERPL-MCNC: 1.3 MG/DL
DIASTOLIC DYSFUNCTION: NO
DIFFERENTIAL METHOD: ABNORMAL
EOSINOPHIL # BLD AUTO: 0 K/UL
EOSINOPHIL NFR BLD: 0.1 %
ERYTHROCYTE [DISTWIDTH] IN BLOOD BY AUTOMATED COUNT: 13.4 %
EST. GFR  (AFRICAN AMERICAN): 48 ML/MIN/1.73 M^2
EST. GFR  (NON AFRICAN AMERICAN): 41.7 ML/MIN/1.73 M^2
ESTIMATED PA SYSTOLIC PRESSURE: 22.47
GLUCOSE SERPL-MCNC: 125 MG/DL
HCT VFR BLD AUTO: 42.4 %
HGB BLD-MCNC: 13.3 G/DL
IMM GRANULOCYTES # BLD AUTO: 0.32 K/UL
IMM GRANULOCYTES NFR BLD AUTO: 1.7 %
LYMPHOCYTES # BLD AUTO: 1.6 K/UL
LYMPHOCYTES NFR BLD: 8.6 %
MAGNESIUM SERPL-MCNC: 2 MG/DL
MCH RBC QN AUTO: 30 PG
MCHC RBC AUTO-ENTMCNC: 31.4 G/DL
MCV RBC AUTO: 96 FL
MONOCYTES # BLD AUTO: 0.9 K/UL
MONOCYTES NFR BLD: 4.5 %
NEUTROPHILS # BLD AUTO: 16 K/UL
NEUTROPHILS NFR BLD: 84.8 %
NRBC BLD-RTO: 0 /100 WBC
PHOSPHATE SERPL-MCNC: 3 MG/DL
PLATELET # BLD AUTO: 268 K/UL
PMV BLD AUTO: 11.3 FL
POCT GLUCOSE: 137 MG/DL (ref 70–110)
POCT GLUCOSE: 73 MG/DL (ref 70–110)
POCT GLUCOSE: 75 MG/DL (ref 70–110)
POCT GLUCOSE: 77 MG/DL (ref 70–110)
POCT GLUCOSE: 80 MG/DL (ref 70–110)
POCT GLUCOSE: 93 MG/DL (ref 70–110)
POTASSIUM SERPL-SCNC: 4.4 MMOL/L
PROCALCITONIN SERPL IA-MCNC: 0.14 NG/ML
PROT SERPL-MCNC: 6.2 G/DL
RBC # BLD AUTO: 4.44 M/UL
RETIRED EF AND QEF - SEE NOTES: 55 (ref 55–65)
SODIUM SERPL-SCNC: 144 MMOL/L
TRICUSPID VALVE REGURGITATION: NORMAL
WBC # BLD AUTO: 18.8 K/UL

## 2018-06-11 PROCEDURE — 97535 SELF CARE MNGMENT TRAINING: CPT

## 2018-06-11 PROCEDURE — G8987 SELF CARE CURRENT STATUS: HCPCS | Mod: CL

## 2018-06-11 PROCEDURE — 93010 ELECTROCARDIOGRAM REPORT: CPT | Mod: ,,, | Performed by: INTERNAL MEDICINE

## 2018-06-11 PROCEDURE — 97166 OT EVAL MOD COMPLEX 45 MIN: CPT

## 2018-06-11 PROCEDURE — 25000242 PHARM REV CODE 250 ALT 637 W/ HCPCS: Performed by: PHYSICIAN ASSISTANT

## 2018-06-11 PROCEDURE — 92610 EVALUATE SWALLOWING FUNCTION: CPT

## 2018-06-11 PROCEDURE — 80053 COMPREHEN METABOLIC PANEL: CPT

## 2018-06-11 PROCEDURE — 76937 US GUIDE VASCULAR ACCESS: CPT

## 2018-06-11 PROCEDURE — 36569 INSJ PICC 5 YR+ W/O IMAGING: CPT

## 2018-06-11 PROCEDURE — 84100 ASSAY OF PHOSPHORUS: CPT

## 2018-06-11 PROCEDURE — 83735 ASSAY OF MAGNESIUM: CPT

## 2018-06-11 PROCEDURE — 25000242 PHARM REV CODE 250 ALT 637 W/ HCPCS: Performed by: PSYCHIATRY & NEUROLOGY

## 2018-06-11 PROCEDURE — A4216 STERILE WATER/SALINE, 10 ML: HCPCS | Performed by: STUDENT IN AN ORGANIZED HEALTH CARE EDUCATION/TRAINING PROGRAM

## 2018-06-11 PROCEDURE — 27000221 HC OXYGEN, UP TO 24 HOURS

## 2018-06-11 PROCEDURE — 25000003 PHARM REV CODE 250: Performed by: STUDENT IN AN ORGANIZED HEALTH CARE EDUCATION/TRAINING PROGRAM

## 2018-06-11 PROCEDURE — 93306 TTE W/DOPPLER COMPLETE: CPT

## 2018-06-11 PROCEDURE — 87205 SMEAR GRAM STAIN: CPT

## 2018-06-11 PROCEDURE — G8988 SELF CARE GOAL STATUS: HCPCS | Mod: CI

## 2018-06-11 PROCEDURE — 97163 PT EVAL HIGH COMPLEX 45 MIN: CPT

## 2018-06-11 PROCEDURE — G8997 SWALLOW GOAL STATUS: HCPCS | Mod: CK

## 2018-06-11 PROCEDURE — 97530 THERAPEUTIC ACTIVITIES: CPT

## 2018-06-11 PROCEDURE — 99233 SBSQ HOSP IP/OBS HIGH 50: CPT | Mod: ,,, | Performed by: PSYCHIATRY & NEUROLOGY

## 2018-06-11 PROCEDURE — G8996 SWALLOW CURRENT STATUS: HCPCS | Mod: CM

## 2018-06-11 PROCEDURE — 94640 AIRWAY INHALATION TREATMENT: CPT

## 2018-06-11 PROCEDURE — 85025 COMPLETE CBC W/AUTO DIFF WBC: CPT

## 2018-06-11 PROCEDURE — G8989 SELF CARE D/C STATUS: HCPCS | Mod: CL

## 2018-06-11 PROCEDURE — 93306 TTE W/DOPPLER COMPLETE: CPT | Mod: 26,,, | Performed by: INTERNAL MEDICINE

## 2018-06-11 PROCEDURE — 87070 CULTURE OTHR SPECIMN AEROBIC: CPT

## 2018-06-11 PROCEDURE — 84145 PROCALCITONIN (PCT): CPT

## 2018-06-11 PROCEDURE — 94761 N-INVAS EAR/PLS OXIMETRY MLT: CPT

## 2018-06-11 PROCEDURE — 87070 CULTURE OTHR SPECIMN AEROBIC: CPT | Mod: 59

## 2018-06-11 PROCEDURE — 93005 ELECTROCARDIOGRAM TRACING: CPT

## 2018-06-11 PROCEDURE — C1751 CATH, INF, PER/CENT/MIDLINE: HCPCS

## 2018-06-11 PROCEDURE — 63600175 PHARM REV CODE 636 W HCPCS: Performed by: PSYCHIATRY & NEUROLOGY

## 2018-06-11 PROCEDURE — 97802 MEDICAL NUTRITION INDIV IN: CPT

## 2018-06-11 PROCEDURE — 99900026 HC AIRWAY MAINTENANCE (STAT)

## 2018-06-11 PROCEDURE — 63600175 PHARM REV CODE 636 W HCPCS: Performed by: STUDENT IN AN ORGANIZED HEALTH CARE EDUCATION/TRAINING PROGRAM

## 2018-06-11 PROCEDURE — 20000000 HC ICU ROOM

## 2018-06-11 RX ORDER — ASPIRIN 325 MG
325 TABLET ORAL DAILY
Status: DISCONTINUED | OUTPATIENT
Start: 2018-06-11 | End: 2018-06-12

## 2018-06-11 RX ORDER — HEPARIN SODIUM 5000 [USP'U]/ML
5000 INJECTION, SOLUTION INTRAVENOUS; SUBCUTANEOUS EVERY 8 HOURS
Status: DISCONTINUED | OUTPATIENT
Start: 2018-06-11 | End: 2018-06-12

## 2018-06-11 RX ORDER — IBUPROFEN 200 MG
16 TABLET ORAL
Status: DISCONTINUED | OUTPATIENT
Start: 2018-06-11 | End: 2018-06-14 | Stop reason: HOSPADM

## 2018-06-11 RX ORDER — INSULIN ASPART 100 [IU]/ML
1-10 INJECTION, SOLUTION INTRAVENOUS; SUBCUTANEOUS EVERY 8 HOURS PRN
Status: DISCONTINUED | OUTPATIENT
Start: 2018-06-11 | End: 2018-06-12

## 2018-06-11 RX ORDER — HYDRALAZINE HYDROCHLORIDE 25 MG/1
25 TABLET, FILM COATED ORAL EVERY 8 HOURS
Status: DISCONTINUED | OUTPATIENT
Start: 2018-06-11 | End: 2018-06-11

## 2018-06-11 RX ORDER — AMOXICILLIN 250 MG
1 CAPSULE ORAL DAILY
Status: DISCONTINUED | OUTPATIENT
Start: 2018-06-12 | End: 2018-06-14 | Stop reason: HOSPADM

## 2018-06-11 RX ORDER — IBUPROFEN 200 MG
24 TABLET ORAL
Status: DISCONTINUED | OUTPATIENT
Start: 2018-06-11 | End: 2018-06-14 | Stop reason: HOSPADM

## 2018-06-11 RX ORDER — FUROSEMIDE 10 MG/ML
20 INJECTION INTRAMUSCULAR; INTRAVENOUS DAILY
Status: DISCONTINUED | OUTPATIENT
Start: 2018-06-11 | End: 2018-06-13

## 2018-06-11 RX ORDER — BENZONATATE 100 MG/1
100 CAPSULE ORAL 3 TIMES DAILY PRN
Status: DISCONTINUED | OUTPATIENT
Start: 2018-06-11 | End: 2018-06-14 | Stop reason: HOSPADM

## 2018-06-11 RX ORDER — IPRATROPIUM BROMIDE AND ALBUTEROL SULFATE 2.5; .5 MG/3ML; MG/3ML
3 SOLUTION RESPIRATORY (INHALATION) EVERY 6 HOURS
Status: DISCONTINUED | OUTPATIENT
Start: 2018-06-11 | End: 2018-06-13

## 2018-06-11 RX ORDER — GLUCAGON 1 MG
1 KIT INJECTION
Status: DISCONTINUED | OUTPATIENT
Start: 2018-06-11 | End: 2018-06-14 | Stop reason: HOSPADM

## 2018-06-11 RX ORDER — HYDRALAZINE HYDROCHLORIDE 25 MG/1
25 TABLET, FILM COATED ORAL EVERY 8 HOURS
Status: DISCONTINUED | OUTPATIENT
Start: 2018-06-11 | End: 2018-06-12

## 2018-06-11 RX ORDER — IPRATROPIUM BROMIDE AND ALBUTEROL SULFATE 2.5; .5 MG/3ML; MG/3ML
3 SOLUTION RESPIRATORY (INHALATION) EVERY 4 HOURS
Status: DISCONTINUED | OUTPATIENT
Start: 2018-06-11 | End: 2018-06-11

## 2018-06-11 RX ORDER — FLUTICASONE PROPIONATE 50 MCG
2 SPRAY, SUSPENSION (ML) NASAL DAILY
Status: DISCONTINUED | OUTPATIENT
Start: 2018-06-11 | End: 2018-06-14 | Stop reason: HOSPADM

## 2018-06-11 RX ORDER — ATORVASTATIN CALCIUM 20 MG/1
40 TABLET, FILM COATED ORAL DAILY
Status: DISCONTINUED | OUTPATIENT
Start: 2018-06-12 | End: 2018-06-14 | Stop reason: HOSPADM

## 2018-06-11 RX ADMIN — IPRATROPIUM BROMIDE AND ALBUTEROL SULFATE 3 ML: .5; 3 SOLUTION RESPIRATORY (INHALATION) at 09:06

## 2018-06-11 RX ADMIN — FLUTICASONE PROPIONATE 100 MCG: 50 SPRAY, METERED NASAL at 12:06

## 2018-06-11 RX ADMIN — Medication 3 ML: at 02:06

## 2018-06-11 RX ADMIN — ASPIRIN 325 MG ORAL TABLET 325 MG: 325 PILL ORAL at 04:06

## 2018-06-11 RX ADMIN — FUROSEMIDE 20 MG: 10 INJECTION, SOLUTION INTRAMUSCULAR; INTRAVENOUS at 12:06

## 2018-06-11 RX ADMIN — Medication 3 ML: at 10:06

## 2018-06-11 RX ADMIN — NICARDIPINE HYDROCHLORIDE 5 MG/HR: 0.2 INJECTION, SOLUTION INTRAVENOUS at 03:06

## 2018-06-11 RX ADMIN — IPRATROPIUM BROMIDE AND ALBUTEROL SULFATE 3 ML: .5; 3 SOLUTION RESPIRATORY (INHALATION) at 02:06

## 2018-06-11 RX ADMIN — NICARDIPINE HYDROCHLORIDE 5 MG/HR: 0.2 INJECTION, SOLUTION INTRAVENOUS at 09:06

## 2018-06-11 RX ADMIN — HEPARIN SODIUM 5000 UNITS: 5000 INJECTION, SOLUTION INTRAVENOUS; SUBCUTANEOUS at 09:06

## 2018-06-11 RX ADMIN — IPRATROPIUM BROMIDE AND ALBUTEROL SULFATE 3 ML: .5; 3 SOLUTION RESPIRATORY (INHALATION) at 12:06

## 2018-06-11 RX ADMIN — HEPARIN SODIUM 5000 UNITS: 5000 INJECTION, SOLUTION INTRAVENOUS; SUBCUTANEOUS at 04:06

## 2018-06-11 RX ADMIN — HYDRALAZINE HYDROCHLORIDE 25 MG: 25 TABLET, FILM COATED ORAL at 09:06

## 2018-06-11 RX ADMIN — HYDRALAZINE HYDROCHLORIDE 25 MG: 25 TABLET, FILM COATED ORAL at 02:06

## 2018-06-11 RX ADMIN — IPRATROPIUM BROMIDE AND ALBUTEROL SULFATE 3 ML: .5; 3 SOLUTION RESPIRATORY (INHALATION) at 04:06

## 2018-06-11 RX ADMIN — IPRATROPIUM BROMIDE AND ALBUTEROL SULFATE 3 ML: .5; 3 SOLUTION RESPIRATORY (INHALATION) at 07:06

## 2018-06-11 RX ADMIN — Medication 3 ML: at 05:06

## 2018-06-11 NOTE — ASSESSMENT & PLAN NOTE
--JOSE, lasix started for pulmonary congestion and elevated bnp, but dc'd before transfer due to jose.

## 2018-06-11 NOTE — PLAN OF CARE
Problem: Patient Care Overview  Goal: Plan of Care Review  Outcome: Ongoing (interventions implemented as appropriate)  Nutrition assessment completed. Please see RD note for details.    Recommendation/Intervention:   1. If able to advance diet, recommend diabetic with texture per SLP recommendations. Education to be provided as appropriate.    2. If unable to advance diet and enteral access gained, recommend starting TF.   Glucerna 1.5 @ 55mL/hr.  - Initiate @ 15mL/hr and increase by 10mL q4hrs, or as tolerated, until goal rate is reached.   - Hold for residuals >500mL.       RD to monitor.    Goals: Pt to receive and tolerate >50% of meals.  Nutrition Goal Status: new  Communication of RD Recs: reviewed with RN

## 2018-06-11 NOTE — CONSULTS
Midline placed  in right cephalic, size [id number] Lot# [id number] Removal date on or before 7/10/18. Needle advanced into vessel with real time ultrasound guidance. Image recorded and saved.

## 2018-06-11 NOTE — PT/OT/SLP EVAL
Occupational Therapy   Evaluation    Name: Franklin Christian  MRN: [id number]  Admitting Diagnosis:  Received tissue plasminogen activator (t-PA) less than 24 hours prior to arrival      Recommendations:     Discharge Recommendations: SNF  Discharge Equipment Recommendations:   (TBD)  Barriers to discharge:  Decreased caregiver support    History:     Occupational Profile:  Living Environment: Pt lives in a duplex c one ISAIAH and has a tub/shower combo.  Previous level of function: I PTA  Roles and Routines: N/A  Equipment Owned:  none (Owns  and BSC)  Assistance upon Discharge: Pt lives c her roommate who works during the day.    Past Medical History:   Diagnosis Date    Diabetes mellitus     Hypertension     MI, old     Renal disorder        Past Surgical History:   Procedure Laterality Date    CARDIAC PACEMAKER PLACEMENT      CARDIAC PACEMAKER PLACEMENT      HYSTERECTOMY      SHOULDER SURGERY         Subjective     Chief Complaint: Pt was admitted c embolic stroke of L MCA.  Patient/Family stated goals: To get better.  Communicated with: RN prior to session.  Pain/Comfort:  · Pain Rating 1: 0/10    Patients cultural, spiritual, Baptist conflicts given the current situation: None    Objective:     Patient found with: blood pressure cuff, oxygen, PureWick, peripheral IV, PRAFO, pulse ox (continuous), SCD, telemetry    General Precautions: Standard, aspiration, fall   Orthopedic Precautions:N/A   Braces: N/A     Occupational Performance:    Bed Mobility:    · Patient completed Supine to Sit with stand by assistance    Functional Mobility/Transfers:  · Patient completed Sit <> Stand Transfer with minimum assistance  with  no assistive device   · Patient completed Bed <> Chair Transfer using Stand Pivot technique with minimum assistance with no assistive device  · Functional Mobility: Pt c poor static/dynamic standing balance.  Has a tendency to lean posteriorly.  Also reported dizziness when sitting  up.    Activities of Daily Living:  · LB Dressing: total assistance to don socks.    Cognitive/Visual Perceptual:  Cognitive/Psychosocial Skills:     -       Oriented to: Person, Time and Situation   -       Follows Commands/attention:Follows multistep  commands  -       Communication: clear/fluent  -       Memory: Impaired STM and Impaired LTM  -       Safety awareness/insight to disability: impaired   -       Mood/Affect/Coping skills/emotional control: Appropriate to situation  Visual/Perceptual:      -Pt c/o blurry vision.    Physical Exam:  Sensation:    -       Impaired  light/touch L UE. Pt c no c/o numbness or tingling but had difficulty c sensation tests.  Upper Extremity Range of Motion:     -       Right Upper Extremity: WFL  -       Left Upper Extremity: WFL  Upper Extremity Strength:    -       Right Upper Extremity: 4/5  -       Left Upper Extremity: 3+/5   Strength:    -       Right Upper Extremity: 4/5  -       Left Upper Extremity: 3/5  Fine Motor Coordination:    -       Impaired  Left hand, finger to nose fair, Right hand, finger to nose good, Left hand thumb/finger opposition skills fair and Right hand thumb/finger opposition skills good  Gross motor coordination:   WFL    Patient left up in chair with all lines intact, call button in reach, RN notified and family present    [facility] 6 Click:  [facility] Total Score: 14  Education:    Assessment:     Franklin Christian is a 72 y.o. female with a medical diagnosis of Received tissue plasminogen activator (t-PA) less than 24 hours prior to arrival.  She was able to perform supine/sit T/F c CGA and sit/stand and bed/chair T/F c min A.  Pt has poor static/dynamic standing balance and leans posteriorly when standing.  Had c/o dizziness when sitting up.  Was Ox2 and follows simple 1 step commands consistently.  Able to perform LB dressing c total assist.  Pt had c/o blurry vision but was able to track.  Has 4/5 R UE strength and 3+/5 L UE strength.  L UE also  "c noted decreased sensation and decreased FMC.  Performance deficits affecting function are weakness, impaired endurance, impaired self care skills, impaired functional mobilty, impaired balance, visual deficits, impaired cognition, decreased coordination, decreased upper extremity function, impaired fine motor.      Rehab Prognosis:  Good; patient would benefit from acute skilled OT services to address these deficits and reach maximum level of function.         Clinical Decision Makin.  OT Mod:  "Pt evaluation falls under moderate complexity for evaluation coding due to identification of 3-5 performance deficits noted as stated above. Eval required Min/Mod assistance to complete on this date and detailed assessment(s) were utilized. Moreover, an expanded review of history and occupational profile obtained with additional review of cognitive, physical and psychosocial hx."     Plan:     Patient to be seen 6 x/week to address the above listed problems via self-care/home management, therapeutic activities, therapeutic exercises, neuromuscular re-education, cognitive retraining, sensory integration  · Plan of Care Expires: 18  · Plan of Care Reviewed with: patient, family    This Plan of care has been discussed with the patient who was involved in its development and understands and is in agreement with the identified goals and treatment plan    GOALS:    Occupational Therapy Goals        Problem: Occupational Therapy Goal    Goal Priority Disciplines Outcome Interventions   Occupational Therapy Goal     OT, PT/OT     Description:  Goals to be met by: 18     Patient will increase functional independence with ADLs by performing:    UE Dressing with [place].  LE Dressing with Modified [place].  Grooming while standing at sink with Modified [place].  Toileting from toilet with Modified [place] for hygiene and clothing management.   Bathing from  shower chair/bench with Modified " [place].  Toilet transfer to toilet with Modified [place].  Increased functional strength to WFL for B UE.  Upper extremity exercise program x15 reps per handout, with independence.  Pt will be Ox4.  Pt will state what to do in an emergency c 100% accuracy.                        Time Tracking:     OT Date of Treatment: 06/11/18  OT Start Time: 1023  OT Stop Time: 1053  OT Total Time (min): 30 min    Billable Minutes:Evaluation 10  Self Care/Home Management 20    CHIOMA Hill  6/11/2018

## 2018-06-11 NOTE — SUBJECTIVE & OBJECTIVE
Past Medical History:   Diagnosis Date    Diabetes mellitus     Hypertension     MI, old     Renal disorder      Past Surgical History:   Procedure Laterality Date    CARDIAC PACEMAKER PLACEMENT      CARDIAC PACEMAKER PLACEMENT      HYSTERECTOMY      SHOULDER SURGERY       No family history on file.  Social History   Substance Use Topics    Smoking status: Former Smoker    Smokeless tobacco: Never Used    Alcohol use No     Review of patient's allergies indicates:  No Known Allergies    Medications: I have reviewed the current medication administration record.    Prescriptions Prior to Admission   Medication Sig Dispense Refill Last Dose    albuterol 90 mcg/actuation inhaler Inhale 1-2 puffs into the lungs every 6 (six) hours as needed for Wheezing. Rescue 1 Inhaler 0 6/7/2018 at Unknown time    albuterol-ipratropium (DUO-NEB) 2.5 mg-0.5 mg/3 mL nebulizer solution Take 3 mLs by nebulization every 4 (four) hours. Rescue  0     baclofen (LIORESAL) 20 MG tablet Take 1 tablet (20 mg total) by mouth 3 (three) times daily. 90 tablet 11     benzonatate (TESSALON) 100 MG capsule Take 1 capsule (100 mg total) by mouth 3 (three) times daily as needed for Cough.       buPROPion (WELLBUTRIN SR) 100 MG TBSR 12 hr tablet Take 1 tablet (100 mg total) by mouth 2 (two) times daily. 60 tablet 11     carvedilol (COREG) 12.5 MG tablet Take 1 tablet (12.5 mg total) by mouth 2 (two) times daily with meals. 60 tablet 11 6/7/2018 at Unknown time    dextromethorphan-guaifenesin  mg (MUCINEX DM)  mg per 12 hr tablet Take 1 tablet by mouth 2 (two) times daily. 20 tablet 0     [START ON 6/11/2018] famotidine (PEPCID) 20 MG tablet Take 1 tablet (20 mg total) by mouth once daily.       gabapentin (NEURONTIN) 300 MG capsule Take 1 capsule (300 mg total) by mouth every evening. 20 capsule 0 6/7/2018 at Unknown time    hydrocodone-acetaminophen 5-325mg (NORCO) 5-325 mg per tablet Take 1 tablet by mouth every 6  (six) hours as needed for Pain. 12 tablet 0 6/7/2018 at Unknown time    insulin glargine (LANTUS) 100 unit/mL injection Inject 10 Units into the skin once daily. 3 mL 11 6/7/2018 at Unknown time    oxybutynin (DITROPAN) 5 MG Tab Take 1 tablet (5 mg total) by mouth 2 (two) times daily. 60 tablet 11     [START ON 6/11/2018] pantoprazole (PROTONIX) 40 MG tablet Take 1 tablet (40 mg total) by mouth once daily.          Review of Systems   Unable to perform ROS: Acuity of condition   Constitutional: Negative for fever.   HENT: Negative for drooling.    Respiratory: Positive for cough, shortness of breath and wheezing.    Cardiovascular: Positive for leg swelling.   Gastrointestinal: Negative for diarrhea and vomiting.   Musculoskeletal: Negative for neck stiffness.   Skin: Negative for rash.   Neurological: Positive for facial asymmetry, speech difficulty, weakness and numbness.   Psychiatric/Behavioral: Positive for confusion.     Objective:     Vital Signs (Most Recent):       Vital Signs Range (Last 24H):  Temp:  [96.2 °F (35.7 °C)-98.4 °F (36.9 °C)]   Pulse:  [64-87]   Resp:  [16-34]   BP: (150-213)/()   SpO2:  [87 %-96 %]     Physical Exam   Constitutional: She appears well-developed and well-nourished. She appears lethargic. No distress.   HENT:   Head: Normocephalic and atraumatic.   Right Ear: External ear normal.   Left Ear: External ear normal.   Nose: Nose normal.   Mouth/Throat: Oropharynx is clear and moist. No oropharyngeal exudate.   Eyes: Conjunctivae are normal. Pupils are equal, round, and reactive to light. Right eye exhibits no discharge. Left eye exhibits no discharge. No scleral icterus.   Neck: Normal range of motion. Neck supple. No thyromegaly present.   Cardiovascular: Normal rate and regular rhythm.    Pulmonary/Chest: Effort normal. No respiratory distress. She has no rhonchi.   Abdominal: Soft. Bowel sounds are normal. She exhibits no distension. There is no hepatosplenomegaly. There  is no tenderness.   Musculoskeletal: She exhibits no edema.   Lymphadenopathy:     She has no cervical adenopathy.   Neurological: She appears lethargic. She is disoriented. GCS eye subscore is 4. GCS verbal subscore is 4. GCS motor subscore is 6.   Skin: Skin is warm and dry. Capillary refill takes less than 2 seconds. She is not diaphoretic.   Psychiatric: She has a normal mood and affect.   Nursing note and vitals reviewed.      Neurological Exam:   LOC: drowsy  Attention Span: poor  Sensation: Tray-anesthesia right      Laboratory:  CMP:   Recent Labs  Lab 06/10/18  1322   CALCIUM 9.3   ALBUMIN 2.6*   PROT 6.3      K 4.6   CO2 30*      BUN 41*   CREATININE 1.6*   ALKPHOS 86   ALT 15   AST 12   BILITOT 0.4     CBC:   Recent Labs  Lab 06/10/18  1322   WBC 13.28*   RBC 4.00   HGB 11.3*   HCT 37.9      MCV 95   MCH 28.3   MCHC 29.8*     Lipid Panel:   Recent Labs  Lab 06/10/18  2006   CHOL 148   LDLCALC 83.4   HDL 47   TRIG 88     Coagulation:   Recent Labs  Lab 06/07/18  0733  06/10/18  2006   INR  --   < > 1.1   APTT 28.1  --   --    < > = values in this interval not displayed.  Hgb A1C:   Recent Labs  Lab 06/10/18  2006   HGBA1C 10.5*     TSH:   Recent Labs  Lab 06/10/18  2006   TSH 1.535       Diagnostic Results:      Brain imaging:  CTH 06/10/2018   Inferior left frontal lobe more conspicuous area of hypoattenuation without significant volume loss, which may be related to artifact versus acute/subacute infarct.  Further evaluation/follow-up as warranted.    Patient has a pacemaker.  No MRI.    Vessel Imaging:  CTA MP 06/10/2018   Subtle decrease in prominence of the right MCA vascularity on the 1st phase of imaging in comparison the left with fill-in on the 2nd phase of imaging.  No occlusion is seen.  Right upper lobe 5 mm solid pulmonary nodule.  For a solid nodule <6 mm, Fleischner Society 2017 guidelines recommend no routine follow up for a low risk patient, or follow-up with non-contrast  chest CT at 12 months in a high risk patient.  Mid to upper esophageal distension with intraluminal gas and fluid contents, which may be related to reflux versus soft geode dysmotility, and could place patient at risk for aspiration.  Bilateral upper lobe mild nonspecific peribronchial thickening with centrilobular patchy ground-glass attenuation which can be seen with small vessel disease including pulmonary edema, small airways disease with early changes of bronchopneumonia, or aspiration.  Further evaluation/follow-up as warranted.    Cardiac Evaluation:   2D Echo 06/07/18  CONCLUSIONS     1 - Normal left ventricular systolic function (EF 55-60%).     2 - Indeterminate LV diastolic function.     3 - Normal right ventricular systolic function .     4 - The estimated PA systolic pressure is 27 mmHg.

## 2018-06-11 NOTE — ASSESSMENT & PLAN NOTE
72 y.o. female with significant past medical history of COPD, DM II, HTN, CKD III, and hx of MI presented to hospital as a transfer from [facility] for evaluation of R MCA stroke symptoms. Received tPA. No LVO, no intervention.    CT with Left inferior frontal area of hypoattenuation which may represent acute or remote infarction. Pt with PM, so unable to obtain MRI to further characterize. Weakness is improved on exam 6/11. NPO per SLP.  Etiology of pt's presentation currently remains unclear.      Antithrombotics for secondary stroke prevention: Antiplatelets: Aspirin: 325 mg daily  Statins for secondary stroke prevention and hyperlipidemia, if present:   Statins: Atorvastatin- 40 mg daily  Aggressive risk factor modification: HTN, DM, HLD, Diet, Exercise, Obesity  Rehab efforts: PT/OT/SLP to evaluate and treat   Dispo: SNF  Diagnostics ordered/pending: None   VTE prophylaxis: Heparin 5000 units SQ every 8 hours  BP parameters: Infarct: Post tPA, SBP <180

## 2018-06-11 NOTE — CONSULTS
[facility]  Vascular Neurology  Comprehensive Stroke Center  Consult Note    Inpatient consult to Vascular (Stroke) Neurology  Consult performed by: KALIA SHIPMAN  Consult ordered by: ABBIE MARS  Reason for consult: transfer, post tPA        Assessment/Plan:     * Received tissue plasminogen activator (t-PA) less than 24 hours prior to arrival    -Admitted to [facility] for close monitoring.        Embolic stroke involving left middle cerebral artery    72 y.o. female with significant past medical history of COPD, DM II, HTN, CKD III, and hx of MI presented to hospital as a transfer from [facility] for evaluation of R MCA stroke symptoms.  Received tPA.  No LVO, no intervention.    Antithrombotics for secondary stroke prevention: Antiplatelets: None: Hold all Antithrombotics x 24 hours after IV t-PA administration and start ASA 81 mg when 24 hr tPA monitoring is over if no contraindication.    Statins for secondary stroke prevention and hyperlipidemia, if present:   Statins: Atorvastatin- 40 mg daily    Aggressive risk factor modification: HTN, DM, Obesity     Rehab efforts: PT/OT/SLP to evaluate and treat    Diagnostics ordered/pending: CT scan of head without contrast to asses brain parenchyma, Lipid Profile to assess cholesterol levels, TSH to assess thyroid function    VTE prophylaxis: None: Reason for No Pharmacological VTE Prophylaxis: Mechanical prophylaxis: Place SCDs, hold pharmacologic prophylaxis for 24 hrs post tPA administration    BP parameters: Infarct: Post tPA, SBP <180            Type 2 diabetes mellitus, with long-term current use of insulin    -Stroke risk factor.  A1c pending.  -Recommend tight glucose control.  Managed by primary team.        Essential hypertension    -Stroke risk factor.  SBP<180.        Acute asthma exacerbation    -Admitted to OSH for asthma exacerbation.  Remains w/intermittent, productive cough.  -Managed by primary team.            STROKE  DOCUMENTATION     Acute Stroke Times   Last Known Normal Date: 06/10/18  Last Known Normal Time: 1215  Symptom Onset Date: 06/10/18  Symptom Onset Time: 1215  Stroke Team Called Date: 06/10/18  Stroke Team Arrival Date: 06/10/18  CT Interpretation Time: 1325  Decision to Treat Time for Alteplase: 1345    NIH Scale:  1a. Level Of Consciousness: 1-->Not alert: but arousable by minor stimulation to obey, answer, or respond  1b. LOC Questions: 1-->Answers one question correctly  1c. LOC Commands: 1-->Performs one task correctly  2. Best Gaze: 0-->Normal  3. Visual: 0-->No visual loss  4. Facial Palsy: 1-->Minor paralysis (flattened nasolabial fold, asymmetry on smiling)  5a. Motor Arm, Left: 0-->No drift: limb holds 90 (or 45) degrees for full 10 secs  5b. Motor Arm, Right: 2-->Some effort against gravity: limb cannot get to or maintain (if cued) 90 (or 45) degrees, drifts down to bed, but has some effort against gravity  6a. Motor Leg, Left: 0-->No drift: leg holds 30 degree position for full 5 secs  6b. Motor Leg, Right: 3-->No effort against gravity: leg falls to bed immediately  7. Limb Ataxia: 0-->Absent  8. Sensory: 1-->Mild-to-moderate sensory loss: patient feels pinprick is less sharp or is dull on the affected side: or there is a loss of superficial pain with pinprick, but patient is aware of being touched  9. Best Language: 1-->Mild-to-moderate aphasia: some obvious loss of fluency or facility of comprehension, without significant limitation on ideas expressed or form of expression. Reduction of speech and/or comprehension, however, makes conversation. . . (see row details)  10. Dysarthria: 0-->Normal  11. Extinction and Inattention (formerly Neglect): 0-->No abnormality  Total (NIH Stroke Scale): 11    Modified [place] Score: 1  [place] Coma Scale:14   ABCD2 Score:    [id number] Score:   HAS -BLED Score:   ICH Score:   Hunt & Diehl Classification:       Thrombolysis Candidate? Yes, given prior to arrival at  outside hospital      Interventional Revascularization Candidate?   Is the patient eligible for mechanical endovascular reperfusion (DASHAWN)?  No; No large vessel occlusion      Hemorrhagic change of an Ischemic Stroke: Does this patient have an ischemic stroke with hemorrhagic changes? No     Subjective:     History of Present Illness:  Patient is a 72 y.o. female with significant past medical history of COPD, DM II, HTN, CKD III, and hx of MI presented to hospital as a transfer from [facility] for evaluation of R MCA stroke symptoms.  HPI information gathered from a review of the medical record due to the patient's condition, no family at the bedside.  The patient was admitted to the OSH on 6/7 for acute asthma exacerbation.  Today, she acutely developed anisocoria, AMS after a duoneb tx. Patient was LKN ~1215.  There was no reported precipitating symptoms.  Nothing was reported to have exacerbated or alleviated the patient's symptoms.  A CTH was obtained and was negative for acute findings.  Telestroke consult performed by Dr. Khalil.  tPA administered.  Transferred to [facility] for possible intervention, higher level of care.  On arrival the patient was placed in [facility].  She is lethargic but following commands.  Taken to CT for CTA MP.  Dr. Piña reviewed images as acquired.  No LVO.  Patient not a candidate for IR intervention.  Will be admitted to [facility] for close monitoring.          Past Medical History:   Diagnosis Date    Diabetes mellitus     Hypertension     MI, old     Renal disorder      Past Surgical History:   Procedure Laterality Date    CARDIAC PACEMAKER PLACEMENT      CARDIAC PACEMAKER PLACEMENT      HYSTERECTOMY      SHOULDER SURGERY       No family history on file.  Social History   Substance Use Topics    Smoking status: Former Smoker    Smokeless tobacco: Never Used    Alcohol use No     Review of patient's allergies indicates:  No Known Allergies    Medications: I have  reviewed the current medication administration record.    Prescriptions Prior to Admission   Medication Sig Dispense Refill Last Dose    albuterol 90 mcg/actuation inhaler Inhale 1-2 puffs into the lungs every 6 (six) hours as needed for Wheezing. Rescue 1 Inhaler 0 6/7/2018 at Unknown time    albuterol-ipratropium (DUO-NEB) 2.5 mg-0.5 mg/3 mL nebulizer solution Take 3 mLs by nebulization every 4 (four) hours. Rescue  0     baclofen (LIORESAL) 20 MG tablet Take 1 tablet (20 mg total) by mouth 3 (three) times daily. 90 tablet 11     benzonatate (TESSALON) 100 MG capsule Take 1 capsule (100 mg total) by mouth 3 (three) times daily as needed for Cough.       buPROPion (WELLBUTRIN SR) 100 MG TBSR 12 hr tablet Take 1 tablet (100 mg total) by mouth 2 (two) times daily. 60 tablet 11     carvedilol (COREG) 12.5 MG tablet Take 1 tablet (12.5 mg total) by mouth 2 (two) times daily with meals. 60 tablet 11 6/7/2018 at Unknown time    dextromethorphan-guaifenesin  mg (MUCINEX DM)  mg per 12 hr tablet Take 1 tablet by mouth 2 (two) times daily. 20 tablet 0     [START ON 6/11/2018] famotidine (PEPCID) 20 MG tablet Take 1 tablet (20 mg total) by mouth once daily.       gabapentin (NEURONTIN) 300 MG capsule Take 1 capsule (300 mg total) by mouth every evening. 20 capsule 0 6/7/2018 at Unknown time    hydrocodone-acetaminophen 5-325mg (NORCO) 5-325 mg per tablet Take 1 tablet by mouth every 6 (six) hours as needed for Pain. 12 tablet 0 6/7/2018 at Unknown time    insulin glargine (LANTUS) 100 unit/mL injection Inject 10 Units into the skin once daily. 3 mL 11 6/7/2018 at Unknown time    oxybutynin (DITROPAN) 5 MG Tab Take 1 tablet (5 mg total) by mouth 2 (two) times daily. 60 tablet 11     [START ON 6/11/2018] pantoprazole (PROTONIX) 40 MG tablet Take 1 tablet (40 mg total) by mouth once daily.          Review of Systems   Unable to perform ROS: Acuity of condition   Constitutional: Negative for fever.    HENT: Negative for drooling.    Respiratory: Positive for cough, shortness of breath and wheezing.    Cardiovascular: Positive for leg swelling.   Gastrointestinal: Negative for diarrhea and vomiting.   Musculoskeletal: Negative for neck stiffness.   Skin: Negative for rash.   Neurological: Positive for facial asymmetry, speech difficulty, weakness and numbness.   Psychiatric/Behavioral: Positive for confusion.     Objective:     Vital Signs (Most Recent):       Vital Signs Range (Last 24H):  Temp:  [96.2 °F (35.7 °C)-98.4 °F (36.9 °C)]   Pulse:  [64-87]   Resp:  [16-34]   BP: (150-213)/()   SpO2:  [87 %-96 %]     Physical Exam   Constitutional: She appears well-developed and well-nourished. She appears lethargic. No distress.   HENT:   Head: Normocephalic and atraumatic.   Right Ear: External ear normal.   Left Ear: External ear normal.   Nose: Nose normal.   Mouth/Throat: Oropharynx is clear and moist. No oropharyngeal exudate.   Eyes: Conjunctivae are normal. Pupils are equal, round, and reactive to light. Right eye exhibits no discharge. Left eye exhibits no discharge. No scleral icterus.   Neck: Normal range of motion. Neck supple. No thyromegaly present.   Cardiovascular: Normal rate and regular rhythm.    Pulmonary/Chest: Effort normal. No respiratory distress. She has no rhonchi.   Abdominal: Soft. Bowel sounds are normal. She exhibits no distension. There is no hepatosplenomegaly. There is no tenderness.   Musculoskeletal: She exhibits no edema.   Lymphadenopathy:     She has no cervical adenopathy.   Neurological: She appears lethargic. She is disoriented. GCS eye subscore is 4. GCS verbal subscore is 4. GCS motor subscore is 6.   Skin: Skin is warm and dry. Capillary refill takes less than 2 seconds. She is not diaphoretic.   Psychiatric: She has a normal mood and affect.   Nursing note and vitals reviewed.      Neurological Exam:   LOC: drowsy  Attention Span: poor  Sensation: Tray-anesthesia  right      Laboratory:  CMP:   Recent Labs  Lab 06/10/18  1322   CALCIUM 9.3   ALBUMIN 2.6*   PROT 6.3      K 4.6   CO2 30*      BUN 41*   CREATININE 1.6*   ALKPHOS 86   ALT 15   AST 12   BILITOT 0.4     CBC:   Recent Labs  Lab 06/10/18  1322   WBC 13.28*   RBC 4.00   HGB 11.3*   HCT 37.9      MCV 95   MCH 28.3   MCHC 29.8*     Lipid Panel:   Recent Labs  Lab 06/10/18  2006   CHOL 148   LDLCALC 83.4   HDL 47   TRIG 88     Coagulation:   Recent Labs  Lab 06/07/18  0733  06/10/18  2006   INR  --   < > 1.1   APTT 28.1  --   --    < > = values in this interval not displayed.  Hgb A1C:   Recent Labs  Lab 06/10/18  2006   HGBA1C 10.5*     TSH:   Recent Labs  Lab 06/10/18  2006   TSH 1.535       Diagnostic Results:      Brain imaging:  CTH 06/10/2018   Inferior left frontal lobe more conspicuous area of hypoattenuation without significant volume loss, which may be related to artifact versus acute/subacute infarct.  Further evaluation/follow-up as warranted.    Patient has a pacemaker.  No MRI.    Vessel Imaging:  CTA  06/10/2018   Subtle decrease in prominence of the right MCA vascularity on the 1st phase of imaging in comparison the left with fill-in on the 2nd phase of imaging.  No occlusion is seen.  Right upper lobe 5 mm solid pulmonary nodule.  For a solid nodule <6 mm, Fleischner Society 2017 guidelines recommend no routine follow up for a low risk patient, or follow-up with non-contrast chest CT at 12 months in a high risk patient.  Mid to upper esophageal distension with intraluminal gas and fluid contents, which may be related to reflux versus soft geode dysmotility, and could place patient at risk for aspiration.  Bilateral upper lobe mild nonspecific peribronchial thickening with centrilobular patchy ground-glass attenuation which can be seen with small vessel disease including pulmonary edema, small airways disease with early changes of bronchopneumonia, or aspiration.  Further  evaluation/follow-up as warranted.    Cardiac Evaluation:   2D Echo 06/07/18  CONCLUSIONS     1 - Normal left ventricular systolic function (EF 55-60%).     2 - Indeterminate LV diastolic function.     3 - Normal right ventricular systolic function .     4 - The estimated PA systolic pressure is 27 mmHg.       Liz Lyles, MIR, NP  Comprehensive Stroke Center  Department of Vascular Neurology   [facility]

## 2018-06-11 NOTE — PT/OT/SLP EVAL
"Physical Therapy Evaluation and Treatment    Patient Name:  Franklin Christian   MRN:  [id number]    Recommendations:     Discharge Recommendations:  nursing facility, skilled   Discharge Equipment Recommendations:  (TBD)   Barriers to discharge: Inaccessible home (dtr lives in 2nd floor apt) and Decreased caregiver support    Plan:     During this hospitalization, patient to be seen 5 x/week to address the above listed problems via gait training, therapeutic activities, therapeutic exercises, neuromuscular re-education  · Plan of Care Expires:  07/11/18   Plan of Care Reviewed with: patient, daughter      History:     Past Medical History:   Diagnosis Date    Diabetes mellitus     Hypertension     MI, old     Renal disorder        Past Surgical History:   Procedure Laterality Date    CARDIAC PACEMAKER PLACEMENT      CARDIAC PACEMAKER PLACEMENT      HYSTERECTOMY      SHOULDER SURGERY       Subjective     Communicated with RN prior to session.  Patient found sitting up in chair upon PT entry to room, agreeable to evaluation.      Chief Complaint: "I don't feel clean"  Pain/Comfort:  · Pain Rating 1: 0/10  · Pain Rating Post-Intervention 1: 0/10    Living Environment:  Patient was living alone in a Duplex with 1 step to enter. Roommate works and is not able to assist.  The patient's dtr recently moved out (to ) and the dtr states she noticed a decline in her mother's mobility and mood since this time. Immediately prior to admit, she was hospitalized for CHF exacerbation (6/7) then began exhibiting s/s acute stroke.  Her dtr is available to provide 24 hr assistance at her residence in [facility], 2nd floor walk up.  Patient has the following equipment:  (SW and BSC).      Objective:     Patient found with: blood pressure cuff, peripheral IV, pulse ox (continuous), telemetry, SCD, oxygen, PureWick, pressure relief boots     General Precautions: Standard, aspiration, fall (TPA)   Patient was found sitting up in " chair in NAD, sleeping with family present. Purewick not working.  Therapy session interrupted by urinary incontinence.   was offered. The patient's dtr declined and offered to translate for her mother.     PHYSICAL EXAMINATION  Cognitive Function:  - Oriented to: person, hospital (not city), and situation   - Level of Alertness: Somnolent but arousable  - Follows Commands/attention: Follows two-step commands  - Communication: Bengali speaking  - Safety awareness/insight to disability: intact  Musculoskeletal System  Upper Extremities:   ROM: WFL  Strength: grossly WFL  Lower Extremities:  ROM: WFL, limited by body habitus   Strength: grossly WFL, no knee buckling in standing  Cardiopulmonary System:   - Edema: NT  - SpO2: 94 on supplemental oxygen   - HR/BP: 82; 146/68  Neuromuscular System:  - Sensation: c/o R hand numbness  Posture and gross symmetry: retropulsion in initial standing  Vision:  NT    BALANCE:  Sitting: SBA  Standing:moderate assistance initially d/t retropulsion, but improved to min assistance with prolonged standing and no AD    FUNCTIONAL MOBILITY ASSESSMENT:  Bed Mobility: performed with HOB flat  - Rolling/Turning R: modified independent using bed rails  - Rolling/Turning L: modified independent using bed rails  - Supine <> sit: moderate assistance d/t bed height     Transfers:  - Sit <> stand transfer: min assistance    - Bed <> chair transfer: min assistance stand pivot transfer    Gait:   Gait limited to 2-3 steps from chair to bed, min assistance with no AD.   Additional gait NT d/t TPA precautions, acute c/o worsening R hand numbness, and recent urinary incontinence with acute hygiene needs.     Stairs:  NT d/t TPA precautions    THERAPEUTIC ACTIVITIES AND EXERCISES:  PT instructed patient in use of UEs and positioning to improve independence with bed mobility while dtr performed myesha care and hygiene. Pt independent with rolling when using bed rails.  Min assistance  for hooklying position and verbal cues for hand placement to perform scooting up in bed.     Therapist educated patient and dtr on the role of PT, POC, and therapy recommendations of SNF to improve mobility and progress to stair training.  Therapist discussed the patients current mobility status and level of assistance with patient, dtr and RN.  Therapist answered questions to patient/familys satisfaction within scope of practice.  White board updated to reflect current level of assistance.      Patient left supine with all lines intact, call button in reach and RN present.     AM-PAC 6 CLICK MOBILITY  Total Score:15     GOALS:    Physical Therapy Goals        Problem: Physical Therapy Goal    Goal Priority Disciplines Outcome Goal Variances Interventions   Physical Therapy Goal     PT/OT, PT Ongoing (interventions implemented as appropriate)     Description:    Goals to be met by 6/22/2018    1. Pt will perform supine to sit from both sides of the bed with SBA.  2. Pt will perform sit to supine with SBA.  3. Pt will stand x 5 minutes with CGA and no LOB while performing dynamic UE tasks to prepare for functional tasks in standing  4. Pt will perform sit to stand transfers with CGA.    5. Pt will perform bed <> chair transfers with SBA.  6. Pt will perform gait x 50 feet with SBA using appropriate AD.  7. Pt will ascend and descend 2 flights of stairs with hand rail in order to access her dtr's home (desired d/c destination)                    Assessment:     rFanklin Christian is a 72 y.o. female admitted with a medical diagnosis of acute stroke who Received tissue plasminogen activator (t-PA) less than 24 hours prior to arrival.  She was admitted to the hospital from an outside facility where she was being treated for a CHF exacerbation immediately prior to admit. She now presents with the following impairments/functional limitations:  weakness, gait instability, decreased upper extremity function, decreased lower  extremity function, impaired balance, impaired endurance, impaired sensation, impaired self care skills, impaired functional mobilty, impaired fine motor, impaired cardiopulmonary response to activity.  Gait assessment was limited on this visit dt TPA precautions and fall risk.  Her daughter is available to assist the patient in her own (dtr's) home, however it is on the 2nd floor with no elevator access.  Pt will require additional skilled PT services to progress balance and assess safety with gait and stairs prior to discharge.  Recommend SNF to safely progress mobility, reach mobility goals and safely move to dtr's 2nd floor residence.     Rehab Prognosis:  good; patient would benefit from acute skilled PT services to address these deficits and reach maximum level of function.      Recent Surgery: * No surgery found *        Clinical Decision Making:   COMPLEXITY OF PT EXAMINATION:  HISTORY  - Comorbidities that affect the PT plan of care or the patient's ability to participate in/progress with therapy:  1. CHF exacerbation   2. Morbid obesity   3. COPD  - Personal Factors:   1. Multiple Medical Problems.  2. status of current condition .  EXAMINATION  - Body Systems:  1. Communication ability, affect, cognition, language, and learning style: the assessment of the ability to make needs known, consciousness, orientation, expected emotional /behavioral responses, and learning preferences  2. Neuromuscular system: a general assessment of gross coordinated movement (eg, balance, gait, locomotion, transfers, and transitions) and motor function (motor control and motor learning)  3. Musculoskeletal system: the assessment of gross symmetry, gross range of motion, gross strength, height, and weight  4. Cardiovascular/pulmonary system: the assessment of heart rate, respiratory rate, blood pressure, SpO2, and edema   - Activity or participation limitations:   Status of current condition  CLINICAL PRESENTATION:  Unstable/unpredictable characteristics  Patient was evaluated in the ICU with continuous monitoring of vitals, within 24 hrs of receiving TPA, and with acute change in R hand sensation   LEVEL OF COMPLEXITY: High Complexity - at least 3 or more personal factors or comorbidities that impact the plan of care; examination addressing 4 or more body structures and functions, activity limitations, and/or participation restrictions; clinical presentation with unstable or unpredictable characteristics    Time Tracking:     PT Received On: 06/11/18  PT Start Time: 1440     PT Stop Time: 1515  PT Total Time (min): 35 min     Billable Minutes: Evaluation 25 and Therapeutic Activity 8      Feli Lizama, PT  06/11/2018

## 2018-06-11 NOTE — ASSESSMENT & PLAN NOTE
Contributing Nutrition Diagnosis  Inadequate energy intake    Related to (etiology):   NPO    Signs and Symptoms (as evidenced by):   Pt receiving <85% EEN and EPN.     Interventions/Recommendations (treatment strategy):  Please see RD recs above.    Nutrition Diagnosis Status:   New

## 2018-06-11 NOTE — ASSESSMENT & PLAN NOTE
-Admitted to OSH for asthma exacerbation.  Remains w/intermittent, productive cough.  -Managed by primary team.

## 2018-06-11 NOTE — PT/OT/SLP EVAL
"Speech Language Pathology Evaluation  Bedside Swallow    Patient Name:  Franklin Christian   MRN:  [id number]  Admitting Diagnosis: Received tissue plasminogen activator (t-PA) less than 24 hours prior to arrival    Recommendations:                 General Recommendations:  Dysphagia therapy, Speech language evaluation and Cognitive-linguistic evaluation  Diet recommendations:  NPO, NPO   Aspiration Precautions: Strict aspiration precautions   General Precautions: Standard, aspiration, fall  Communication strategies:  Tanzanian speaking    History:     Past Medical History:   Diagnosis Date    Diabetes mellitus     Hypertension     MI, old     Renal disorder        Past Surgical History:   Procedure Laterality Date    CARDIAC PACEMAKER PLACEMENT      CARDIAC PACEMAKER PLACEMENT      HYSTERECTOMY      SHOULDER SURGERY         Subjective     "antonina, dos, radha" (pt counted for vocal assessment post po trial)  Pt seen this am with no family present.  ST returned later post session for education to pt/ family.  Pt participated in swallow assessment well given basic directions in Tanzanian.  Lethargy noted requiring frequent cues to rouse between bolus trials.    Pain/Comfort:  Pain Rating 1: 0/10  Pain Rating Post-Intervention 1: 0/10    Objective:     Oral Musculature Evaluation  Oral Musculature: WFL  Mucosal Quality: adequate  Mandibular Strength and Mobility: WFL  Oral Labial Strength and Mobility: WFL  Lingual Strength and Mobility: WFL  Volitional Cough: fair in strength  Voice Prior to PO Intake: clear; milldly hoarse    Bedside Swallow Eval:   Consistencies Assessed:  · Thin liquids (ice x2, tsp water x2, cup sip water x2)  · Puree (1/2 tsp x1)     Oral Phase:   · WFL    Pharyngeal Phase:   · decreased hyolaryngeal excursion to palpation  · delayed swallow initation   · No overt coughing/choking observed; however, pt with drop in O2 sats x3 (lowest reaching upper 70's)during limited po trials resulting in inability to " rule out silent aspiration. O2 sats recovered quickly and po trials were halted as a result of risk for aspiration. Drop occurred post ice chip x1, post cup sip water x1 and post puree trial x1.  · Note pt had to be roused between most bolus trials resulting in increased risk for aspiration as well.     Treatment:   Education provided to pt during assessment and to pt/daughter/grandaughter post session.   offered; however, pt's daughter expressed comfort interpreting information to pt.  Reviewed role of ST; swallow assessment results/recommendations, risk factors for aspiration, s/s of possible aspiration and ST plan of care.  Pt's daughter states cognition/language to be functioning at about 75% of pt's baseline.  ST will continue to follow.   Whiteboard updated.  Results / recommendations reviewed with nursing.     Assessment:     Franklin Christian is a 72 y.o. female with an SLP diagnosis of Dysphagia.      Goals:    SLP Goals        Problem: SLP Goal    Goal Priority Disciplines Outcome   SLP Goal     SLP    Description:  Speech Language Pathology Goals  Goals expected to be met by 6/18  1. Pt will participate in ongoing swallow assessment.  2. Pt will participate in speech/language/cognitive evaluation s/p stroke.                          Plan:     · Patient to be seen:  5 x/week   · Plan of Care expires:  07/10/18  · Plan of Care reviewed with:  patient   · SLP Follow-Up:  Yes       Discharge recommendations:  rehabilitation facility       Time Tracking:     SLP Treatment Date:   06/11/18  Speech Start Time:  1120  Speech Stop Time:  1135     Speech Total Time (min):  15 min    Billable Minutes: Eval Swallow and Oral Function 15    MARIXA Segovia, CCC-SLP  Speech Language Pathologist  [phone]  6/11/2018

## 2018-06-11 NOTE — PLAN OF CARE
Problem: Physical Therapy Goal  Goal: Physical Therapy Goal  Outcome: Ongoing (interventions implemented as appropriate)  Initial eval completed.  Results, POC, and therapy recommendations discussed with patient and dtr.  Complete evaluation documentation to follow.     Recommend transfer to chair with nursing assist (1 person assistance).  Gait and stair climbing unable to be assessed 2* TPA within 24 hrs.     Feli Lizama, PT  6/11/2018  [phone] (pager)

## 2018-06-11 NOTE — HOSPITAL COURSE
6/11: some asterixis noted left hand by nurse as well as paresthesia right arm likely reflecting left subcortical parietal/thalamic involvement by stroke, asa started  6/12- off cardene, CT head at 8 am with evolving left MCA territory stroke, no hemorrhage - exam remains improved and sputum production less frequent. On minimal home o2. Stable for ttf to stroke service  6/13- Boarding CVA service, needs Rehab for discharge placment  6/14: discharge pending. Will clarify O2 needs before discharge

## 2018-06-11 NOTE — PLAN OF CARE
Pt is a 72 year old female, lives alone, was indpeendent with ADLS prior to d/c. Refused HH at previous hospitalization. Pt has a standard walker and BSC, but does not use.    Pt's dtg, Mary Anne provided assessment info as pt is Romanian speaking. Mary Anne reports that she and her mother lived together until 2 weeks ago. After Mary Anne moved out, pt's friends and neighbors have reported that pt has declined in physical health. Mary Anne would like pt to go to her home in  upon d/c or go to a facility (SNF/rehab) in .    PAYOR: [employer]    PCP: Rom Jacques MD  [address] [facility], [address]  [phone]    PHARMACY: [facility]  [address] [place] [address]   [phone]       06/11/18 1207   Discharge Assessment   Assessment Type Discharge Planning Assessment   Confirmed/corrected address and phone number on facesheet? Yes  (Phone [phone])   Assessment information obtained from? Other  (Pts dtg, Mary Anne Valente [phone])   Communicated expected length of stay with patient/caregiver no   Prior to hospitilization cognitive status: Alert/Oriented   Prior to hospitalization functional status: Independent   Current cognitive status: Alert/Oriented   Current Functional Status: Needs Assistance   [place] [facility] [place]   Lives With alone   Able to Return to Prior Arrangements no   Is patient able to care for self after discharge? Unable to determine at this time (comments)   Patient's perception of discharge disposition skilled nursing facility   Readmission Within The Last 30 Days no previous admission in last 30 days   Patient currently being followed by outpatient case management? No   Patient currently receives any other outside agency services? No   Equipment Currently Used at Home none  (Pt has standard walker and BSC, but does not use at this time)   Do you have any problems affording any of your prescribed medications? No   Is the patient taking medications as prescribed?  yes   Does the patient have transportation home? Yes   Does the patient receive services at [facility]? Yes   Discharge Plan A Skilled Nursing Facility   Discharge Plan B Home Health;Home with family   Patient/Family In Agreement With Plan yes

## 2018-06-11 NOTE — SUBJECTIVE & OBJECTIVE
Neurologic Chief Complaint: AMS    Subjective:     Interval History: Patient is seen for follow-up neurological assessment and treatment recommendations: NAEON. Pt desatted to 70s% during swallow/SLP evaluation; remains NPO. NGT to be placed so pt can get today's ASA dose. On Cardene.    HPI, Past Medical, Family, and Social History remains the same as documented in the initial encounter.     Review of Systems   Constitutional: Negative for chills and fever.   Eyes: Negative for discharge and visual disturbance.   Neurological: Positive for facial asymmetry, weakness and numbness.   Psychiatric/Behavioral: Negative for agitation and behavioral problems.     Scheduled Meds:   albuterol-ipratropium  3 mL Nebulization Q6H    aspirin  325 mg Per NG tube Daily    [START ON 6/12/2018] atorvastatin  40 mg Per NG tube Daily    fluticasone  2 spray Each Nare Daily    furosemide  20 mg Intravenous Daily    heparin (porcine)  5,000 Units Subcutaneous Q8H    hydrALAZINE  25 mg Per NG tube Q8H    [START ON 6/12/2018] senna-docusate 8.6-50 mg  1 tablet Per NG tube Daily    sodium chloride 0.9%  3 mL Intravenous Q8H     Continuous Infusions:   sodium chloride 0.9% 75 mL/hr at 06/11/18 1800    nicardipine 5 mg/hr (06/11/18 1800)     PRN Meds:acetaminophen, benzonatate, dextrose 50%, dextrose 50%, glucagon (human recombinant), glucose, glucose, insulin aspart U-100, labetalol    Objective:     Vital Signs (Most Recent):  Temp: 98.6 °F (37 °C) (06/11/18 1100)  Pulse: 73 (06/11/18 1700)  Resp: 15 (06/11/18 1800)  BP: (!) 176/74 (06/11/18 1700)  SpO2: 98 % (06/11/18 1700)  BP Location: Right arm    Vital Signs Range (Last 24H):  Temp:  [97.4 °F (36.3 °C)-98.6 °F (37 °C)]   Pulse:  [72-85]   Resp:  [15-43]   BP: (129-187)/()   SpO2:  [93 %-98 %]   BP Location: Right arm    Physical Exam   Constitutional: She is oriented to person, place, and time. She appears well-developed and well-nourished. No distress.   HENT:    Head: Normocephalic and atraumatic.   Eyes: Conjunctivae and EOM are normal.   Cardiovascular: Normal rate.    Pulmonary/Chest: Effort normal. No respiratory distress.   Musculoskeletal: She exhibits no tenderness or deformity.   Neurological: She is alert and oriented to person, place, and time. A cranial nerve deficit and sensory deficit (R hand numbness) is present.   Mild intermittent asterixis of L hand   Skin: Skin is warm and dry.   Psychiatric: She has a normal mood and affect. Her behavior is normal.       Neurological Exam:   LOC: drowsy  Attention Span: Good   Language: No aphasia, Exam somewhat limited by language deficit  Articulation: No obvious dysarthria noted, accounting for different language spoken  Orientation: Person, Age, Time  Visual Fields: Full  EOM (CN III, IV, VI): Full/intact  Facial Movement (CN VII): Lower facial weakness on the Right  Motor: Arm left  Normal 5/5  Leg left  Paresis: 3/5  Arm right  Paresis: 4/5  Leg right Paresis: 3/5  Sensation: Intact to light touch, temp; Reported tingling in the R hand  Tone: Normal tone throughout    Laboratory:  CMP:   Recent Labs  Lab 06/11/18  0246   CALCIUM 9.0   ALBUMIN 2.6*   PROT 6.2      K 4.4   CO2 27      BUN 35*   CREATININE 1.3   ALKPHOS 93   ALT 14   AST 13   BILITOT 0.5     CBC:   Recent Labs  Lab 06/11/18  0246   WBC 18.80*   RBC 4.44   HGB 13.3   HCT 42.4      MCV 96   MCH 30.0   MCHC 31.4*     Lipid Panel:   Recent Labs  Lab 06/10/18  2006   CHOL 148   LDLCALC 83.4   HDL 47   TRIG 88     Coagulation:   Recent Labs  Lab 06/07/18  0733  06/10/18  2006   INR  --   < > 1.1   APTT 28.1  --   --    < > = values in this interval not displayed.  Platelet Aggregation Study: No results for input(s): PLTAGG, PLTAGINTERP, PLTAGREGLACO, ADPPLTAGGREG in the last 168 hours.  Hgb A1C:   Recent Labs  Lab 06/10/18  2006   HGBA1C 10.5*     TSH:   Recent Labs  Lab 06/10/18  2006   TSH 1.535       Diagnostic Results     Brain  Imaging     CT Head 6/10/18 -- OSH  Inferior left frontal lobe more conspicuous area of hypoattenuation without significant volume loss, which may be related to artifact versus acute/subacute infarct. Further evaluation/follow-up as warranted.  Age-related mild senescent changes as above.  Right sphenoid sinus disease.      Vessel Imaging     CTA Multiphase 6/10/18  Redemonstration of a left inferior frontal area of hypoattenuation similar to the prior exam without evidence for significant progression.  This may represent a acute or remote lacunar infarction.  Further evaluation with MRI if clinically warranted.  Subtle decrease in prominence of the right MCA vascularity on the 1st phase of imaging in comparison the left with fill-in on the 2nd phase of imaging.  No occlusion is seen.  Right upper lobe 5 mm solid pulmonary nodule.  For a solid nodule <6 mm, Fleischner Society 2017 guidelines recommend no routine follow up for a low risk patient, or follow-up with non-contrast chest CT at 12 months in a high risk patient.  Mid to upper esophageal distension with intraluminal gas and fluid contents, which may be related to reflux versus soft geode dysmotility, and could place patient at risk for aspiration.  Bilateral upper lobe mild nonspecific peribronchial thickening with centrilobular patchy ground-glass attenuation which can be seen with small vessel disease including pulmonary edema, small airways disease with early changes of bronchopneumonia, or aspiration.  Further evaluation/follow-up as warranted.      Cardiac Imaging     Echo w/ CFD 6/11/18  CONCLUSIONS  Normal [place]     1 - Normal left ventricular systolic function (EF 55-60%).     2 - Normal left ventricular diastolic function.     3 - Normal right ventricular systolic function.

## 2018-06-11 NOTE — DISCHARGE SUMMARY
[facility] Hospital Medicine  Discharge Summary      Patient Name: Franklin Christian  MRN: [id number]  Admission Date: 6/7/2018  Hospital Length of Stay: 1 days  Discharge Date and Time: 6/10/2018  6:19 PM  Attending Physician: No att. providers found   Discharging Provider: Carissa Barney PA-C  Primary Care Provider: Rom Jacques MD      HPI:   Ms. Christian is a 73 yo  female with COPD, DM 2, HTN, hx MI, and CKD 3 who presents today with SOB. She reports she has been having difficulty breathing for one week, accompanied by abdominal pain, chest pain, and productive cough. She reports burning with urination as well. The morning of 6/7 she awoke with severe SOB that was not relieved with her inhaler. She presented to the ED and was found to have a BNP of 873 and pulmonary vascular congestion on CXR. She was given 40 mg IV lasix, duonebs, and solumedrol, and placed on supplemental oxygen. She was admitted to hospital medicine.   Other notable complaint includes 1 month of vaginal bleeding. She has a gynecologist appt on June 20. She reports that she previously had uterine cancer of some sort.   Ms. Christian lives at home. She has oxygen at home but it belongs to someone else. She uses a CPAP.     * No surgery found *      Hospital Course:   Pt remains hypoxic on supplemental O2, SpO2 92-94% consistently. Becomes SOB with cough. BG increased over 600; improved with increased insulin. Rise in creatinine to 2.1 so lasix d/kandi, improved to 1.6. Seen by PT/OT d/t marked debility and recommend SNF placement. On 6/10 called to bedside by RN for AMS; pt obtunded and not responding as usual, anisocoria noted. Code Stroke called. Per VN start tPA and stat transfer to main campus. Pt received tPA without incident. Pt will d/c and transfer.      Consults:   Consults         Status Ordering Provider     Inpatient consult to Telemedicine-Stroke  Once     Provider:  (Not yet assigned)     Acknowledged TRINIDAD YUEN.          * Acute asthma exacerbation    SOB (shortness of breath)  Pt with one week history of SOB and productive cough. Former smoker. Some wheezing on decreased breath sounds on exam. Remains on O2.   Continue scheduled duonebs. Supplemental oxygen.         Pulmonary edema    Elevated serum creatinine  CXR showing bilateral interstitial infiltrates diffusely involving lungs w new limited pulmonary vascular congestion. Pt with cardiac history and s/p pacemaker placement. Echo showing normal EF, indeterminate LV diastolic dysfunction, and PA pressure 27. No edema visible on exam. Cr elevated to 2.1 > 1.6.   Hold lasix 40 mg daily. Continue home amlodipine. Supplemental oxygen.         Type 2 diabetes mellitus, with long-term current use of insulin    A1C 10.1.   Check blood glucose AC and HS and use SSI.  Diabetic diet. Home insulin appears to be 65u before breakfast and 55 u before dinner. Will order 40 units levemir BID.         Essential hypertension    Home meds appear to be amlodipine 10 mg daily, lasix 40 mg daily, lisinopril 10 mg daily.  Continue amlodipine.         Stroke    Given tPA and transferred to [facility].         Debility    PT/OT consulted d/t pt's reported inability to care for herself. Recommend SNF.             Final Active Diagnoses:    Diagnosis Date Noted POA    PRINCIPAL PROBLEM:  Acute asthma exacerbation [J45.901] 06/07/2018 Yes    Type 2 diabetes mellitus, with long-term current use of insulin [E11.9, Z79.4] 06/07/2018 Not Applicable    Pulmonary edema [J81.1] 06/07/2018 Yes    Essential hypertension [I10] 06/07/2018 Yes    Stroke [I63.9] 06/10/2018 No    Elevated serum creatinine [R79.89] 06/08/2018 No    Debility [R53.81] 06/08/2018 No    SOB (shortness of breath) [R06.02] 06/07/2018 Yes      Problems Resolved During this Admission:    Diagnosis Date Noted Date Resolved POA       Discharged Condition: stable    Disposition: Discharged to Other  Faci*    Follow Up:    Patient Instructions:   No discharge procedures on file.    Significant Diagnostic Studies: Labs:   BMP:   Recent Labs  Lab 06/09/18  0405 06/10/18  0323 06/10/18  1322   * 219* 132*    137 142   K 4.5 4.7 4.6    104 106   CO2 28 25 30*   BUN 54* 47* 41*   CREATININE 2.4* 1.9* 1.6*   CALCIUM 9.1 9.3 9.3   MG 1.8 1.9  --    , CBC   Recent Labs  Lab 06/10/18  1322   WBC 13.28*   HGB 11.3*   HCT 37.9       and A1C:   Recent Labs  Lab 06/08/18  0323   HGBA1C 10.1*  10.1*       Pending Diagnostic Studies:     None         Medications:  Reconciled Home Medications:      Medication List      START taking these medications    albuterol-ipratropium 2.5 mg-0.5 mg/3 mL nebulizer solution  Commonly known as:  DUO-NEB  Take 3 mLs by nebulization every 4 (four) hours. Rescue     baclofen 20 MG tablet  Commonly known as:  LIORESAL  Take 1 tablet (20 mg total) by mouth 3 (three) times daily.     benzonatate 100 MG capsule  Commonly known as:  TESSALON  Take 1 capsule (100 mg total) by mouth 3 (three) times daily as needed for Cough.     buPROPion 100 MG TBSR 12 hr tablet  Commonly known as:  WELLBUTRIN SR  Take 1 tablet (100 mg total) by mouth 2 (two) times daily.     dextromethorphan-guaifenesin  mg  mg per 12 hr tablet  Commonly known as:  MUCINEX DM  Take 1 tablet by mouth 2 (two) times daily.     famotidine 20 MG tablet  Commonly known as:  PEPCID  Take 1 tablet (20 mg total) by mouth once daily.  Start taking on:  6/11/2018     oxybutynin 5 MG Tab  Commonly known as:  DITROPAN  Take 1 tablet (5 mg total) by mouth 2 (two) times daily.     pantoprazole 40 MG tablet  Commonly known as:  PROTONIX  Take 1 tablet (40 mg total) by mouth once daily.  Start taking on:  6/11/2018        CONTINUE taking these medications    albuterol 90 mcg/actuation inhaler  Inhale 1-2 puffs into the lungs every 6 (six) hours as needed for Wheezing. Rescue     carvedilol 12.5 MG  tablet  Commonly known as:  COREG  Take 1 tablet (12.5 mg total) by mouth 2 (two) times daily with meals.     gabapentin 300 MG capsule  Commonly known as:  NEURONTIN  Take 1 capsule (300 mg total) by mouth every evening.     HYDROcodone-acetaminophen 5-325 mg per tablet  Commonly known as:  NORCO  Take 1 tablet by mouth every 6 (six) hours as needed for Pain.     insulin glargine 100 unit/mL injection  Commonly known as:  LANTUS  Inject 10 Units into the skin once daily.        STOP taking these medications    methylPREDNISolone 4 mg tablet  Commonly known as:  MEDROL DOSEPACK            Indwelling Lines/Drains at time of discharge:   Lines/Drains/Airways          No matching active lines, drains, or airways          Time spent on the discharge of patient: 55 minutes  Patient was seen and examined on the date of discharge and determined to be suitable for discharge.    Critical care time spent on the evaluation and treatment of severe organ dysfunction, review of pertinent labs and imaging studies, discussions with consulting providers and discussions with patient/family: 45 minutes.     Carissa Barney PA-C  Department of Hospital Medicine  [facility]

## 2018-06-11 NOTE — SUBJECTIVE & OBJECTIVE
Review of Systems   All other systems reviewed and are negative.      Objective:     Vitals:  Temp: 98.6 °F (37 °C)  Pulse: 80  Rhythm: paced rhythm  BP: (!) 144/77  MAP (mmHg): 98  Resp: (!) 29  SpO2: 95 %  O2 Device (Oxygen Therapy): nasal cannula    Temp  Min: 97.4 °F (36.3 °C)  Max: 98.6 °F (37 °C)  Pulse  Min: 64  Max: 83  BP  Min: 129/60  Max: 213/91  MAP (mmHg)  Min: 87  Max: 131  Resp  Min: 15  Max: 43  SpO2  Min: 87 %  Max: 97 %    06/10 0701 - 06/11 0700  In: 975 [I.V.:975]  Out: 1140 [Urine:1140]           Physical Exam   Constitutional: She is oriented to person, place, and time.   Mildly increased BMI   HENT:   Head: Normocephalic.   Eyes: Pupils are equal, round, and reactive to light.   Neck: No JVD present.   Cardiovascular: Normal heart sounds.    Pulmonary/Chest: Breath sounds normal. Tachypnea noted.   Abdominal: Soft. Bowel sounds are normal.   Musculoskeletal: She exhibits no edema.   Neurological: She is alert and oriented to person, place, and time. A sensory deficit is present. No cranial nerve deficit.   Skin: Skin is warm. Capillary refill takes less than 2 seconds.         Medications:  Continuous  sodium chloride 0.9% Last Rate: 75 mL/hr at 06/11/18 1300   nicardipine Last Rate: 5 mg/hr (06/11/18 1300)   Scheduled  albuterol-ipratropium 3 mL Q4H   aspirin 325 mg Daily   [START ON 6/12/2018] atorvastatin 40 mg Daily   fluticasone 2 spray Daily   furosemide 20 mg Daily   hydrALAZINE 25 mg Q8H   [START ON 6/12/2018] senna-docusate 8.6-50 mg 1 tablet Daily   sodium chloride 0.9% 3 mL Q8H   PRN  acetaminophen 650 mg Q6H PRN   benzonatate 100 mg TID PRN   dextrose 50% 12.5 g PRN   dextrose 50% 25 g PRN   glucagon (human recombinant) 1 mg PRN   glucose 16 g PRN   glucose 24 g PRN   insulin aspart U-100 1-10 Units Q8H PRN   labetalol 10 mg Q15 Min PRN     Today I personally reviewed pertinent medications, lines/drains/airways, imaging, lab results, notably:    Diet  Diet NPO  Diet NPO

## 2018-06-11 NOTE — PLAN OF CARE
Problem: Occupational Therapy Goal  Goal: Occupational Therapy Goal  Goals to be met by: 6/25/18     Patient will increase functional independence with ADLs by performing:    UE Dressing with [place].  LE Dressing with Modified [place].  Grooming while standing at sink with Modified [place].  Toileting from toilet with Modified [place] for hygiene and clothing management.   Bathing from  shower chair/bench with Modified [place].  Toilet transfer to toilet with Modified [place].  Increased functional strength to WFL for B UE.  Upper extremity exercise program x15 reps per handout, with independence.  Pt will be Ox4.  Pt will state what to do in an emergency c 100% accuracy.      POC initiated.

## 2018-06-11 NOTE — PLAN OF CARE
Problem: Occupational Therapy Goal  Goal: Occupational Therapy Goal  Goals to be met by: 6/25/18     Patient will increase functional independence with ADLs by performing:    UE Dressing with [place].  LE Dressing with Modified [place].  Grooming while standing at sink with Modified [place].  Toileting from toilet with Modified [place] for hygiene and clothing management.   Bathing from  shower chair/bench with Modified [place].  Toilet transfer to toilet with Modified [place].  Increased functional strength to WFL for B UE.  Upper extremity exercise program x15 reps per handout, with independence.    POC initiated.

## 2018-06-11 NOTE — ASSESSMENT & PLAN NOTE
A1C 10.1.   Check blood glucose AC and HS and use SSI.  Diabetic diet. Home insulin appears to be 65u before breakfast and 55 u before dinner. Will order 40 units levemir BID.

## 2018-06-11 NOTE — ASSESSMENT & PLAN NOTE
Elevated serum creatinine  CXR showing bilateral interstitial infiltrates diffusely involving lungs w new limited pulmonary vascular congestion. Pt with cardiac history and s/p pacemaker placement. Echo showing normal EF, indeterminate LV diastolic dysfunction, and PA pressure 27. No edema visible on exam. Cr elevated to 2.1 > 1.6.   Hold lasix 40 mg daily. Continue home amlodipine. Supplemental oxygen.

## 2018-06-11 NOTE — PLAN OF CARE
Problem: SLP Goal  Goal: SLP Goal  Speech Language Pathology Goals  Goals expected to be met by 6/18  1. Pt will participate in ongoing swallow assessment.  2. Pt will participate in speech/language/cognitive evaluation s/p stroke.        Bedside swallow eval completed.  Recommend safest for pt to remain NPO at this time secondary to lethargy and drop in O2 sats x3 during po trials resulting in inability to rule out silent aspiration.  ST will continue to follow.    MARIXA Segovia, CCC-SLP  Speech Language Pathologist  [phone]  6/11/2018

## 2018-06-11 NOTE — HPI
Ms. Christian is a 73 yo  female with COPD, DM 2, HTN, hx MI, CKD 3 and reported pacemaker per chart, who was admitted at [facility] for SOB  accompanied by abdominal pain, chest pain, and productive cough and dysuria as well as vaginal bleeding x 1 month (hx of uterine cancer, follows gynecologist outpatient). Admitted for what sounds like CHF as well as copd exacerbation and treated accordingly. (see medicine note from [place] for more details in this treatment).     Sent to us after tpa given when she had acute onset of drowsiness and aphasia earlier today. (Time of tpa not found in documentation and nursing report given to ED RN who has not communicated transition of care to NCC RN).    On exam she is arousable to voice, follows commands intermittently and has RUE drift. CTAMP without LVO. Admitted to NCC for post tpa monitoring .

## 2018-06-11 NOTE — ASSESSMENT & PLAN NOTE
--admit to NCC post tpa  --no LVO  --vascular neuro following  --PTOTSlp  --atorvastatin, f/u lipid panel  --sbp <180  --NS IVF  --f/u echo, most recent EF 55%  --patient has pacemaker, no MRI!

## 2018-06-11 NOTE — ASSESSMENT & PLAN NOTE
Home meds appear to be amlodipine 10 mg daily, lasix 40 mg daily, lisinopril 10 mg daily.  Continue amlodipine.

## 2018-06-11 NOTE — PROGRESS NOTES
[facility]  Neurocritical Care  Progress Note    Admit Date: 6/10/2018  Service Date: 06/11/2018  Length of Stay: 1    Subjective:     Chief Complaint: Received tissue plasminogen activator (t-PA) less than 24 hours prior to arrival    History of Present Illness: Ms. Christian is a 73 yo  female with COPD, DM 2, HTN, hx MI, CKD 3 and reported pacemaker per chart, who was admitted at [facility] for SOB  accompanied by abdominal pain, chest pain, and productive cough and dysuria as well as vaginal bleeding x 1 month (hx of uterine cancer, follows gynecologist outpatient). Admitted for what sounds like CHF as well as copd exacerbation and treated accordingly. (see medicine note from [place] for more details in this treatment).     Sent to us after tpa given when she had acute onset of drowsiness and aphasia earlier today. (Time of tpa not found in documentation and nursing report given to ED RN who has not communicated transition of care to NCC RN).    On exam she is arousable to voice, follows commands intermittently and has RUE drift. CTAMP without LVO. Admitted to [facility] for post tpa monitoring .               Hospital Course: 6/11: some asterixis noted left hand by nurse as well as paresthesia right arm likely reflecting left subcortical parietal/thalamic involvement by stroke, asa started        Review of Systems   All other systems reviewed and are negative.      Objective:     Vitals:  Temp: 98.6 °F (37 °C)  Pulse: 80  Rhythm: paced rhythm  BP: (!) 144/77  MAP (mmHg): 98  Resp: (!) 29  SpO2: 95 %  O2 Device (Oxygen Therapy): nasal cannula    Temp  Min: 97.4 °F (36.3 °C)  Max: 98.6 °F (37 °C)  Pulse  Min: 64  Max: 83  BP  Min: 129/60  Max: 213/91  MAP (mmHg)  Min: 87  Max: 131  Resp  Min: 15  Max: 43  SpO2  Min: 87 %  Max: 97 %    06/10 0701 - 06/11 0700  In: 975 [I.V.:975]  Out: 1140 [Urine:1140]           Physical Exam   Constitutional: She is oriented to person, place, and time.    Mildly increased BMI   HENT:   Head: Normocephalic.   Eyes: Pupils are equal, round, and reactive to light.   Neck: No JVD present.   Cardiovascular: Normal heart sounds.    Pulmonary/Chest: Breath sounds normal. Tachypnea noted.   Abdominal: Soft. Bowel sounds are normal.   Musculoskeletal: She exhibits no edema.   Neurological: She is alert and oriented to person, place, and time. A sensory deficit is present. No cranial nerve deficit.   Skin: Skin is warm. Capillary refill takes less than 2 seconds.         Medications:  Continuous  sodium chloride 0.9% Last Rate: 75 mL/hr at 06/11/18 1300   nicardipine Last Rate: 5 mg/hr (06/11/18 1300)   Scheduled  albuterol-ipratropium 3 mL Q4H   aspirin 325 mg Daily   [START ON 6/12/2018] atorvastatin 40 mg Daily   fluticasone 2 spray Daily   furosemide 20 mg Daily   hydrALAZINE 25 mg Q8H   [START ON 6/12/2018] senna-docusate 8.6-50 mg 1 tablet Daily   sodium chloride 0.9% 3 mL Q8H   PRN  acetaminophen 650 mg Q6H PRN   benzonatate 100 mg TID PRN   dextrose 50% 12.5 g PRN   dextrose 50% 25 g PRN   glucagon (human recombinant) 1 mg PRN   glucose 16 g PRN   glucose 24 g PRN   insulin aspart U-100 1-10 Units Q8H PRN   labetalol 10 mg Q15 Min PRN     Today I personally reviewed pertinent medications, lines/drains/airways, imaging, lab results, notably:    Diet  Diet NPO  Diet NPO        Assessment/Plan:     Pulmonary   COPD exacerbation    --continue duonebs and add ICS if needed          Cardiac/Vascular   Essential hypertension    Wean cardene, add scheduled bp meds        Endocrine   Type 2 diabetes mellitus, with long-term current use of insulin    --SSI while npo  --a1c 10  Begin diabetasource down ngt        Other   Elevated serum creatinine    --JOSE, lasix started for pulmonary congestion and elevated bnp, but dc'd before transfer due to jose.   creatinene continuing trend downward            Prophylaxis:  Venous Thromboembolism: chemical  Stress Ulcer: None  Ventilator  Pneumonia: not applicable     Activity Orders          None        Full Code    Augusto mE MD  Neurocritical Care  [facility]

## 2018-06-11 NOTE — H&P
[facility]  Neurocritical Care  History & Physical    Admit Date: 6/10/2018  Service Date: 06/11/2018  Length of Stay: 1    Subjective:     Chief Complaint: Received tissue plasminogen activator (t-PA) less than 24 hours prior to arrival    History of Present Illness: Ms. Christian is a 73 yo  female with COPD, DM 2, HTN, hx MI, CKD 3 and reported pacemaker per chart, who was admitted at [facility] for SOB  accompanied by abdominal pain, chest pain, and productive cough and dysuria as well as vaginal bleeding x 1 month (hx of uterine cancer, follows gynecologist outpatient). Admitted for what sounds like CHF as well as copd exacerbation and treated accordingly. (see medicine note from [place] for more details in this treatment).     Sent to us after tpa given when she had acute onset of drowsiness and aphasia earlier today. (Time of tpa not found in documentation and nursing report given to ED RN who has not communicated transition of care to NCC RN).    On exam she is arousable to voice, follows commands intermittently and has RUE drift. CTAMP without LVO. Admitted to [facility] for post tpa monitoring .               Vitals:   Temp: 97.4 °F (36.3 °C)  Pulse: 76  BP: (!) 151/68  MAP (mmHg): 98  Resp: 19  SpO2: 96 %  O2 Device (Oxygen Therapy): nasal cannula    Temp  Min: 96.2 °F (35.7 °C)  Max: 98.4 °F (36.9 °C)  Pulse  Min: 64  Max: 83  BP  Min: 146/66  Max: 213/91  MAP (mmHg)  Min: 95  Max: 135  Resp  Min: 16  Max: 43  SpO2  Min: 87 %  Max: 97 %    06/10 0701 - 06/11 0700  In: 300 [I.V.:300]  Out: -          Examination:   Constitutional: Well-nourished and -developed. No apparent distress.   Eyes: Conjunctiva clear, anicteric. Lids no lesions.  Head/Ears/Nose/Mouth/Throat/Neck: Moist mucous membranes. External ears, nose atraumatic.   Cardiovascular: Regular rhythm. No murmurs. 1+ edema  Respiratory: Comfortable respirations. Wheezes bilaterally, expiratory  Gastrointestinal: No hernia.  Soft, nondistended, nontender. + bowel sounds.    Neurologic:   -[id number]  --Right facial droop  --no gaze deviation  --4/5 RUE, 2/5 RLE  --intermittently follows commands  --expressive aphasia      Today I independently reviewed pertinent medications, lines/drains/airways, imaging, lab results,   Assessment/Plan:     Neuro   Embolic stroke involving left middle cerebral artery    --admit to [facility] post tpa  --no LVO  --vascular neuro following  --PTOTSlp  --atorvastatin, f/u lipid panel  --sbp <180  --NS IVF  --f/u echo, most recent EF 55%  --patient has pacemaker, no MRI!        Pulmonary   COPD exacerbation    --continue duonebs and add ICS if needed          Endocrine   Type 2 diabetes mellitus, with long-term current use of insulin    --SSI while npo  --a1c 10        Other   * Received tissue plasminogen activator (t-PA) less than 24 hours prior to arrival    --see stroke        Elevated serum creatinine    --JOSE, lasix started for pulmonary congestion and elevated bnp, but dc'd before transfer due to jose.             Prophylaxis:  Venous Thromboembolism: mechanical  Stress Ulcer: None  Ventilator Pneumonia: not applicable     Activity Orders          None        Full Code    Mara Fraga PA-C  Neurocritical Care  [facility]

## 2018-06-11 NOTE — HPI
Patient is a 72 y.o. female with significant past medical history of COPD, DM II, HTN, CKD III, and hx of MI presented to hospital as a transfer from [facility] for evaluation of R MCA stroke symptoms.  HPI information gathered from a review of the medical record due to the patient's condition, no family at the bedside.  The patient was admitted to the OSH on 6/7 for acute asthma exacerbation.  Today, she acutely developed anisocoria, AMS after a duoneb tx. Patient was LKN ~1215.  There was no reported precipitating symptoms.  Nothing was reported to have exacerbated or alleviated the patient's symptoms.  A CTH was obtained and was negative for acute findings.  Telestroke consult performed by Dr. Khalil.  tPA administered.  Transferred to [facility] for possible intervention, higher level of care.  On arrival the patient was placed in [facility].  She is lethargic but following commands.  Taken to CT for CTA MP.  Dr. Piña reviewed images as acquired.  No LVO.  Patient not a candidate for IR intervention.  Will be admitted to [facility] for close monitoring.

## 2018-06-11 NOTE — ASSESSMENT & PLAN NOTE
72 y.o. female with significant past medical history of COPD, DM II, HTN, CKD III, and hx of MI presented to hospital as a transfer from [facility] for evaluation of R MCA stroke symptoms.  Received tPA.  No LVO, no intervention.    Antithrombotics for secondary stroke prevention: Antiplatelets: None: Hold all Antithrombotics x 24 hours after IV t-PA administration and start ASA 81 mg when 24 hr tPA monitoring is over if no contraindication.    Statins for secondary stroke prevention and hyperlipidemia, if present:   Statins: Atorvastatin- 40 mg daily    Aggressive risk factor modification: HTN, DM, Obesity     Rehab efforts: PT/OT/SLP to evaluate and treat    Diagnostics ordered/pending: CT scan of head without contrast to asses brain parenchyma, Lipid Profile to assess cholesterol levels, TSH to assess thyroid function    VTE prophylaxis: None: Reason for No Pharmacological VTE Prophylaxis: Mechanical prophylaxis: Place SCDs, hold pharmacologic prophylaxis for 24 hrs post tPA administration    BP parameters: Infarct: Post tPA, SBP <180

## 2018-06-11 NOTE — ASSESSMENT & PLAN NOTE
Upon review of brain imaging, small area of cytotoxic cerebral edema identified in the territory of the Left anterior cerebral artery. There is not associated mass effect.   We will continue to monitor the patients clinical exam for any worsening of symptoms which may indicate expansion of the stroke or the area of edema resulting in such clinical change.   Pattern is suggestive of an etiology that remains unclear at this time.

## 2018-06-11 NOTE — CONSULTS
Inpatient consult to Physical Medicine Rehab  Consult performed by: MANUEL NAVA  Consult ordered by: ABBIE MARS  Reason for consult: assess rehab needs        Reviewed patient history and current admission.  Rehab team following.  Full consult to follow.    KEYA Maloney, FNP-C  Physical Medicine & Rehabilitation   06/11/2018  Spectralink: [id number]

## 2018-06-11 NOTE — PROGRESS NOTES
Patient arrived to [facility] from [facility] by [facility] ambulance service    Type of stroke/diagnosis:  Ischemic    TPA start and end time: Start 1455 End 1555    Thrombectomy start and end time (if applicable)    Skin assessment done: Y  Wounds noted: none    NCC notified: MADELEINE Castaneda

## 2018-06-11 NOTE — PROGRESS NOTES
0830- Echo being performed at bedside    0900- Medications were held (senna & atorvastatin) due to drowsiness and inability to stay alert. NCC is aware of medications being held.     0945- [facility] ([facility]) called, requested new respiratory culture.     0950- Occupational therapy at bedside; will come back when daughter is present.     1000- NCC rounding at bedside    1020- 12 Lead EKG obtained, sent to muse and printed    1030- Occupational therapy at bedside with patient, daughter and granddaughter; Patient ambulated to chair.     1125- speech at bedside evaluating Patient's swallowing tolerance. Patient's oxygen saturation decreased to 70s with each sip of water taking place 3 times. Patient is not tolerating anything taken by mouth.    1200- PICC team at bedside placing midline.    1206- 18g R cephalic midline placed; Patient tolerated well.     1300- Patient is having inconsistent jerking of Left arm and new tingling on Right hand. MD Rojas is aware of new finding. No new orders placed at this time. Will continue to monitor.     1340- Stroke team at bedside assessing patient.     1348- Speech is back at bedside following up with family from this am's assessment.     1400- PT came and assisted patient to get back into bed with gaitbelt, patient had small BM, was cleaned and resituated into bed.     1500- patient complaining of whole R hand tingling, Md Cisco at bedside assessing patient. No new orders, will continue to monitor.     1500- Plan of care reviewed with patient and family at 1500. Patient verbalized understanding. All questions and concerns addressed today. Patient remains on NS at 75ml/hr and cardene at 5mg/hr.  Patient remains free from injury and falls. No acute events. Patient progressing towards goal. Will continue to monitor. See flowsheet for full assessment and vitals throughout shift.     1600- LISA bedside; able to tolerate medications crushed in pudding. Patient was alert for 10  minutes. Will monitor for drowsiness when patient is eating/drinking.

## 2018-06-11 NOTE — ASSESSMENT & PLAN NOTE
--JOSE, lasix started for pulmonary congestion and elevated bnp, but dc'd before transfer due to jose.   creatinene continuing trend downward

## 2018-06-11 NOTE — ASSESSMENT & PLAN NOTE
-Stroke risk factor   A1c 10.5%  -Recommend tight glucose control  Consider Endocrinology consult for d/c planning

## 2018-06-11 NOTE — HOSPITAL COURSE
Patient presented with left MCA stroke. She received tPA. Initially she failed swallow evaluation and had an NGT placed. Over time her symptoms improved and she is able to eat. She was off the nicardipine drip on the 2nd day of admission. She did very well with PT that they recommended home health PT.   Patient uses oxygen at home sometimes. Nursing did a walk test which she passed without dropping her O2 below 88%. Her insulin regimen was adjusted as well for better BS control. An appointment with her PCP, Dr. Tellez, was arranged as well.

## 2018-06-11 NOTE — CONSULTS
[facility]  Adult Nutrition  Progress Note    SUMMARY       Recommendations    Recommendation/Intervention:   1. If able to advance diet, recommend diabetic with texture per SLP recommendations. Education to be provided as appropriate.    2. If unable to advance diet and enteral access gained, recommend starting TF.   Glucerna 1.5 @ 55mL/hr.  - Initiate @ 15mL/hr and increase by 10mL q4hrs, or as tolerated, until goal rate is reached.   - Hold for residuals >500mL.       RD to monitor.    Goals: Pt to receive and tolerate >50% of meals.  Nutrition Goal Status: new  Communication of RD Recs: reviewed with RN    Reason for Assessment    Reason for Assessment: consult  Diagnosis: stroke/CVA  Relevant Medical History: COPD, T2DM, MI, COPD, CKD St [address], uterine cancer  Interdisciplinary Rounds: attended  General Information Comments: Pt remains NPO. Passed LISA. Inappropriate for education at this time 2/2 NPO (HgbA1c 10.5). SLP recs for NPO.  Nutrition Discharge Planning: adequate po intake for optimal nutrition with diabetic restriction    Nutrition Risk Screen    Nutrition Risk Screen: no indicators present    Nutrition/Diet History    Do you have any cultural, spiritual, Methodist conflicts, given your current situation?: none  Factors Affecting Nutritional Intake: NPO    Anthropometrics    Temp: 97.8 °F (36.6 °C)  Height: 5' (152.4 cm)  Height (inches): 60 in  Weight Method: Bed Scale  Weight: 120 kg (264 lb 8.8 oz)  Weight (lb): 264.55 lb  Ideal Body Weight (IBW), Female: 100 lb  % Ideal Body Weight, Female (lb): 264.55 lb  BMI (Calculated): 51.8  BMI Grade: greater than 40 - morbid obesity       Lab/Procedures/Meds    Pertinent Labs Reviewed: reviewed  Pertinent Labs Comments: HgbA1c 10.5, POCT Glu   Pertinent Medications Reviewed: reviewed  Pertinent Medications Comments: IVF, cardene, statin    Physical Findings/Assessment    Overall Physical Appearance: nourished, obese, on oxygen  therapy  Skin: intact    Estimated/Assessed Needs    Weight Used For Calorie Calculations: 120 kg (264 lb 8.8 oz)  Energy Calorie Requirements (kcal): 2051  Energy Need Method: [place]-St Jeor (PAL 1.25)  Protein Requirements: 120-144g (1.0-1.2g/kg)  Weight Used For Protein Calculations: 120 kg (264 lb 8.8 oz)  Fluid Requirements (mL): 1mL/kcal or per MD     RDA Method (mL): 2051  CHO Requirement: 50% of total kcals      Nutrition Prescription Ordered    Current Diet Order: NPO    Evaluation of Received Nutrient/Fluid Intake    IV Fluid (mL): 1800  % Intake of Estimated Energy Needs: 0 - 25 %  % Meal Intake: NPO    Nutrition Risk    Level of Risk/Frequency of Follow-up:  (f/u 2x/week)     Assessment and Plan    Embolic stroke involving left middle cerebral artery    Contributing Nutrition Diagnosis  Inadequate energy intake    Related to (etiology):   NPO    Signs and Symptoms (as evidenced by):   Pt receiving <85% EEN and EPN.     Interventions/Recommendations (treatment strategy):  Please see RD recs above.    Nutrition Diagnosis Status:   New               Monitor and Evaluation    Food and Nutrient Intake: energy intake, food and beverage intake  Food and Nutrient Adminstration: diet order  Knowledge/Beliefs/Attitudes: food and nutrition knowledge/skill  Anthropometric Measurements: weight, weight change, body mass index  Biochemical Data, Medical Tests and Procedures: electrolyte and renal panel, gastrointestinal profile, glucose/endocrine profile, inflammatory profile, lipid profile  Nutrition-Focused Physical Findings: overall appearance     Nutrition Follow-Up    RD Follow-up?: Yes

## 2018-06-12 LAB
ALBUMIN SERPL BCP-MCNC: 2.5 G/DL
ALP SERPL-CCNC: 83 U/L
ALT SERPL W/O P-5'-P-CCNC: 11 U/L
ANION GAP SERPL CALC-SCNC: 9 MMOL/L
AST SERPL-CCNC: 12 U/L
BACTERIA SPEC AEROBE CULT: NORMAL
BASOPHILS # BLD AUTO: 0.04 K/UL
BASOPHILS NFR BLD: 0.3 %
BILIRUB SERPL-MCNC: 0.6 MG/DL
BUN SERPL-MCNC: 25 MG/DL
CALCIUM SERPL-MCNC: 8.6 MG/DL
CHLORIDE SERPL-SCNC: 107 MMOL/L
CO2 SERPL-SCNC: 30 MMOL/L
CREAT SERPL-MCNC: 1.1 MG/DL
DIFFERENTIAL METHOD: ABNORMAL
EOSINOPHIL # BLD AUTO: 0.1 K/UL
EOSINOPHIL NFR BLD: 0.7 %
ERYTHROCYTE [DISTWIDTH] IN BLOOD BY AUTOMATED COUNT: 13.7 %
EST. GFR  (AFRICAN AMERICAN): 58.8 ML/MIN/1.73 M^2
EST. GFR  (NON AFRICAN AMERICAN): 51 ML/MIN/1.73 M^2
GLUCOSE SERPL-MCNC: 81 MG/DL
GRAM STN SPEC: NORMAL
GRAM STN SPEC: NORMAL
HCT VFR BLD AUTO: 37.7 %
HGB BLD-MCNC: 11.7 G/DL
IMM GRANULOCYTES # BLD AUTO: 0.25 K/UL
IMM GRANULOCYTES NFR BLD AUTO: 1.7 %
LYMPHOCYTES # BLD AUTO: 1.8 K/UL
LYMPHOCYTES NFR BLD: 12.3 %
MAGNESIUM SERPL-MCNC: 1.5 MG/DL
MCH RBC QN AUTO: 30.2 PG
MCHC RBC AUTO-ENTMCNC: 31 G/DL
MCV RBC AUTO: 97 FL
MONOCYTES # BLD AUTO: 0.7 K/UL
MONOCYTES NFR BLD: 5.2 %
NEUTROPHILS # BLD AUTO: 11.5 K/UL
NEUTROPHILS NFR BLD: 79.8 %
NRBC BLD-RTO: 0 /100 WBC
PHOSPHATE SERPL-MCNC: 2.8 MG/DL
PLATELET # BLD AUTO: 277 K/UL
PMV BLD AUTO: 10.9 FL
POCT GLUCOSE: 121 MG/DL (ref 70–110)
POCT GLUCOSE: 127 MG/DL (ref 70–110)
POCT GLUCOSE: 137 MG/DL (ref 70–110)
POCT GLUCOSE: 170 MG/DL (ref 70–110)
POCT GLUCOSE: 179 MG/DL (ref 70–110)
POCT GLUCOSE: 78 MG/DL (ref 70–110)
POCT GLUCOSE: 93 MG/DL (ref 70–110)
POTASSIUM SERPL-SCNC: 4.2 MMOL/L
PROT SERPL-MCNC: 5.8 G/DL
RBC # BLD AUTO: 3.87 M/UL
SODIUM SERPL-SCNC: 146 MMOL/L
WBC # BLD AUTO: 14.34 K/UL

## 2018-06-12 PROCEDURE — 63600175 PHARM REV CODE 636 W HCPCS: Performed by: PHYSICIAN ASSISTANT

## 2018-06-12 PROCEDURE — 92526 ORAL FUNCTION THERAPY: CPT

## 2018-06-12 PROCEDURE — 20000000 HC ICU ROOM

## 2018-06-12 PROCEDURE — 83735 ASSAY OF MAGNESIUM: CPT

## 2018-06-12 PROCEDURE — 85025 COMPLETE CBC W/AUTO DIFF WBC: CPT

## 2018-06-12 PROCEDURE — 25000003 PHARM REV CODE 250: Performed by: STUDENT IN AN ORGANIZED HEALTH CARE EDUCATION/TRAINING PROGRAM

## 2018-06-12 PROCEDURE — 99233 SBSQ HOSP IP/OBS HIGH 50: CPT | Mod: ,,, | Performed by: PHYSICIAN ASSISTANT

## 2018-06-12 PROCEDURE — 80053 COMPREHEN METABOLIC PANEL: CPT

## 2018-06-12 PROCEDURE — 63600175 PHARM REV CODE 636 W HCPCS: Performed by: STUDENT IN AN ORGANIZED HEALTH CARE EDUCATION/TRAINING PROGRAM

## 2018-06-12 PROCEDURE — 25000003 PHARM REV CODE 250: Performed by: PHYSICIAN ASSISTANT

## 2018-06-12 PROCEDURE — 63600175 PHARM REV CODE 636 W HCPCS: Performed by: PSYCHIATRY & NEUROLOGY

## 2018-06-12 PROCEDURE — 84100 ASSAY OF PHOSPHORUS: CPT

## 2018-06-12 PROCEDURE — 99222 1ST HOSP IP/OBS MODERATE 55: CPT | Mod: ,,, | Performed by: NURSE PRACTITIONER

## 2018-06-12 PROCEDURE — 94640 AIRWAY INHALATION TREATMENT: CPT

## 2018-06-12 PROCEDURE — 27000221 HC OXYGEN, UP TO 24 HOURS

## 2018-06-12 PROCEDURE — 94761 N-INVAS EAR/PLS OXIMETRY MLT: CPT

## 2018-06-12 PROCEDURE — 99233 SBSQ HOSP IP/OBS HIGH 50: CPT | Mod: ,,, | Performed by: PSYCHIATRY & NEUROLOGY

## 2018-06-12 PROCEDURE — 25000003 PHARM REV CODE 250: Performed by: PSYCHIATRY & NEUROLOGY

## 2018-06-12 PROCEDURE — 92523 SPEECH SOUND LANG COMPREHEN: CPT

## 2018-06-12 PROCEDURE — 25000242 PHARM REV CODE 250 ALT 637 W/ HCPCS: Performed by: PSYCHIATRY & NEUROLOGY

## 2018-06-12 PROCEDURE — A4216 STERILE WATER/SALINE, 10 ML: HCPCS | Performed by: STUDENT IN AN ORGANIZED HEALTH CARE EDUCATION/TRAINING PROGRAM

## 2018-06-12 RX ORDER — NAPROXEN SODIUM 220 MG/1
81 TABLET, FILM COATED ORAL DAILY
Status: DISCONTINUED | OUTPATIENT
Start: 2018-06-13 | End: 2018-06-14 | Stop reason: HOSPADM

## 2018-06-12 RX ORDER — HYDRALAZINE HYDROCHLORIDE 50 MG/1
50 TABLET, FILM COATED ORAL EVERY 8 HOURS
Status: DISCONTINUED | OUTPATIENT
Start: 2018-06-12 | End: 2018-06-14 | Stop reason: HOSPADM

## 2018-06-12 RX ORDER — CARVEDILOL 6.25 MG/1
6.25 TABLET ORAL 2 TIMES DAILY
Status: DISCONTINUED | OUTPATIENT
Start: 2018-06-12 | End: 2018-06-14 | Stop reason: HOSPADM

## 2018-06-12 RX ORDER — HEPARIN SODIUM 5000 [USP'U]/ML
5000 INJECTION, SOLUTION INTRAVENOUS; SUBCUTANEOUS EVERY 12 HOURS
Status: DISCONTINUED | OUTPATIENT
Start: 2018-06-12 | End: 2018-06-14 | Stop reason: HOSPADM

## 2018-06-12 RX ORDER — INSULIN ASPART 100 [IU]/ML
1-10 INJECTION, SOLUTION INTRAVENOUS; SUBCUTANEOUS
Status: DISCONTINUED | OUTPATIENT
Start: 2018-06-12 | End: 2018-06-14 | Stop reason: HOSPADM

## 2018-06-12 RX ORDER — INSULIN ASPART 100 [IU]/ML
4 INJECTION, SOLUTION INTRAVENOUS; SUBCUTANEOUS
Status: DISCONTINUED | OUTPATIENT
Start: 2018-06-12 | End: 2018-06-14 | Stop reason: HOSPADM

## 2018-06-12 RX ORDER — FAMOTIDINE 20 MG/1
20 TABLET, FILM COATED ORAL 2 TIMES DAILY
Status: DISCONTINUED | OUTPATIENT
Start: 2018-06-12 | End: 2018-06-14 | Stop reason: HOSPADM

## 2018-06-12 RX ORDER — BUPROPION HYDROCHLORIDE 75 MG/1
75 TABLET ORAL 3 TIMES DAILY
Status: DISCONTINUED | OUTPATIENT
Start: 2018-06-12 | End: 2018-06-14 | Stop reason: HOSPADM

## 2018-06-12 RX ADMIN — FAMOTIDINE 20 MG: 20 TABLET ORAL at 10:06

## 2018-06-12 RX ADMIN — HYDRALAZINE HYDROCHLORIDE 50 MG: 50 TABLET ORAL at 09:06

## 2018-06-12 RX ADMIN — Medication 3 ML: at 05:06

## 2018-06-12 RX ADMIN — IPRATROPIUM BROMIDE AND ALBUTEROL SULFATE 3 ML: .5; 3 SOLUTION RESPIRATORY (INHALATION) at 08:06

## 2018-06-12 RX ADMIN — BUPROPION HYDROCHLORIDE 75 MG: 75 TABLET, FILM COATED ORAL at 03:06

## 2018-06-12 RX ADMIN — IPRATROPIUM BROMIDE AND ALBUTEROL SULFATE 3 ML: .5; 3 SOLUTION RESPIRATORY (INHALATION) at 12:06

## 2018-06-12 RX ADMIN — FLUTICASONE PROPIONATE 100 MCG: 50 SPRAY, METERED NASAL at 09:06

## 2018-06-12 RX ADMIN — Medication 3 ML: at 09:06

## 2018-06-12 RX ADMIN — HEPARIN SODIUM 5000 UNITS: 5000 INJECTION, SOLUTION INTRAVENOUS; SUBCUTANEOUS at 05:06

## 2018-06-12 RX ADMIN — INSULIN ASPART 4 UNITS: 100 INJECTION, SOLUTION INTRAVENOUS; SUBCUTANEOUS at 07:06

## 2018-06-12 RX ADMIN — CARVEDILOL 6.25 MG: 6.25 TABLET, FILM COATED ORAL at 10:06

## 2018-06-12 RX ADMIN — NICARDIPINE HYDROCHLORIDE 5 MG/HR: 0.2 INJECTION, SOLUTION INTRAVENOUS at 05:06

## 2018-06-12 RX ADMIN — HYDRALAZINE HYDROCHLORIDE 50 MG: 50 TABLET ORAL at 01:06

## 2018-06-12 RX ADMIN — BUPROPION HYDROCHLORIDE 75 MG: 75 TABLET, FILM COATED ORAL at 09:06

## 2018-06-12 RX ADMIN — IPRATROPIUM BROMIDE AND ALBUTEROL SULFATE 3 ML: .5; 3 SOLUTION RESPIRATORY (INHALATION) at 07:06

## 2018-06-12 RX ADMIN — INSULIN ASPART 4 UNITS: 100 INJECTION, SOLUTION INTRAVENOUS; SUBCUTANEOUS at 11:06

## 2018-06-12 RX ADMIN — ATORVASTATIN CALCIUM 40 MG: 20 TABLET, FILM COATED ORAL at 09:06

## 2018-06-12 RX ADMIN — ASPIRIN 325 MG ORAL TABLET 325 MG: 325 PILL ORAL at 09:06

## 2018-06-12 RX ADMIN — INSULIN ASPART 2 UNITS: 100 INJECTION, SOLUTION INTRAVENOUS; SUBCUTANEOUS at 12:06

## 2018-06-12 RX ADMIN — INSULIN ASPART 2 UNITS: 100 INJECTION, SOLUTION INTRAVENOUS; SUBCUTANEOUS at 07:06

## 2018-06-12 RX ADMIN — IPRATROPIUM BROMIDE AND ALBUTEROL SULFATE 3 ML: .5; 3 SOLUTION RESPIRATORY (INHALATION) at 01:06

## 2018-06-12 RX ADMIN — FUROSEMIDE 20 MG: 10 INJECTION, SOLUTION INTRAMUSCULAR; INTRAVENOUS at 09:06

## 2018-06-12 RX ADMIN — HEPARIN SODIUM 5000 UNITS: 5000 INJECTION, SOLUTION INTRAVENOUS; SUBCUTANEOUS at 09:06

## 2018-06-12 RX ADMIN — INSULIN ASPART 4 UNITS: 100 INJECTION, SOLUTION INTRAVENOUS; SUBCUTANEOUS at 12:06

## 2018-06-12 RX ADMIN — BUPROPION HYDROCHLORIDE 75 MG: 75 TABLET, FILM COATED ORAL at 10:06

## 2018-06-12 RX ADMIN — FAMOTIDINE 20 MG: 20 TABLET ORAL at 09:06

## 2018-06-12 RX ADMIN — Medication 3 ML: at 01:06

## 2018-06-12 RX ADMIN — BENZONATATE 100 MG: 100 CAPSULE ORAL at 10:06

## 2018-06-12 RX ADMIN — CARVEDILOL 6.25 MG: 6.25 TABLET, FILM COATED ORAL at 09:06

## 2018-06-12 RX ADMIN — HYDRALAZINE HYDROCHLORIDE 25 MG: 25 TABLET, FILM COATED ORAL at 05:06

## 2018-06-12 NOTE — ASSESSMENT & PLAN NOTE
--continue duonebs and add ICS if needed  --sputum production less today, comfortable on home o2 NC

## 2018-06-12 NOTE — ASSESSMENT & PLAN NOTE
72 y.o. female with significant past medical history of COPD, DM II, HTN, CKD III, and hx of MI presented to hospital as a transfer from [facility] for evaluation of R MCA stroke symptoms. Received tPA. No LVO, no intervention.    CTH with evolving hypoattenuation in the Left inferior frontal lobe which likely represents age-indeterminate infarction. Pt with PM, unable to obtain MRI to further characterize. Etiology likely small vessel disease (A1c 10.5%). Weakness much improved on exam 6/12.    Pt approved for diet per SLP. Cardene off. Pt will step down if SBP remains stable this afternoon.      Antithrombotics for secondary stroke prevention: Antiplatelets: Aspirin: 325 mg daily  Statins for secondary stroke prevention and hyperlipidemia, if present:   Statins: Atorvastatin- 40 mg daily  Aggressive risk factor modification: HTN, DM, HLD, Diet, Exercise, Obesity  Rehab efforts: PT/OT/SLP to evaluate and treat   Dispo: SNF vs home with daughter (CNA) and HH  Diagnostics ordered/pending: None   VTE prophylaxis: Heparin 5000 units SQ every 8 hours  BP parameters: Infarct: Post tPA, SBP <180

## 2018-06-12 NOTE — ASSESSMENT & PLAN NOTE
-  CTH concerning for L frontal infarct  -  S/p tPA  -  CTA multiphase without LVO--> intervention not indicated  -  Unable to obtain MRI 2/2 pacemaker  -  Repeat CTH pending  -  Etiology unclear  See hospital course for functional, cognitive/speech/language, and nutrition/swallow status.      Recommendations  -  Encourage mobility, OOB in chair, and early ambulation as appropriate   -  PT/OT evaluate and treat  -  SLP speech and cognitive evaluate and treat  -  Monitor mood and sleep disturbances  -  Establish consistent sleep-wake cycle  -  Monitor for bowel and bladder dysfunction  -  Monitor for shoulder pain and subluxation  -  Monitor for spasticity  -  Monitor for and prevent skin breakdown and pressure ulcers  · Early mobility, repositioning/weight shifting every 20-30 minutes when sitting, turn patient every 2 hours, proper mattress/overlay and chair cushioning, pressure relief/heel protector boots  -  DVT prophylaxis:  ROBBIN, SCD

## 2018-06-12 NOTE — ASSESSMENT & PLAN NOTE
Mid to upper esophageal distension seen on CTA  Could place patient at risk for aspiration  Pt currently NPO  SLP following

## 2018-06-12 NOTE — SUBJECTIVE & OBJECTIVE
Past Medical History:   Diagnosis Date    Diabetes mellitus     Hypertension     MI, old     Renal disorder      Past Surgical History:   Procedure Laterality Date    CARDIAC PACEMAKER PLACEMENT      CARDIAC PACEMAKER PLACEMENT      HYSTERECTOMY      SHOULDER SURGERY       Review of patient's allergies indicates:  No Known Allergies    Scheduled Medications:    albuterol-ipratropium  3 mL Nebulization Q6H    [START ON 6/13/2018] aspirin  81 mg Oral Daily    atorvastatin  40 mg Per NG tube Daily    buPROPion  75 mg Oral TID    carvedilol  6.25 mg Oral BID    famotidine  20 mg Oral BID    fluticasone  2 spray Each Nare Daily    furosemide  20 mg Intravenous Daily    heparin (porcine)  5,000 Units Subcutaneous Q12H    hydrALAZINE  50 mg Per NG tube Q8H    insulin aspart U-100  4 Units Subcutaneous TIDWM    senna-docusate 8.6-50 mg  1 tablet Per NG tube Daily    sodium chloride 0.9%  3 mL Intravenous Q8H       PRN Medications: acetaminophen, benzonatate, dextrose 50%, dextrose 50%, glucagon (human recombinant), glucose, glucose, insulin aspart U-100, labetalol    Family History     None        Social History Main Topics    Smoking status: Former Smoker    Smokeless tobacco: Never Used    Alcohol use No    Drug use: No    Sexual activity: Not on file     Review of Systems   Constitutional: Negative for chills, fatigue and fever.   HENT: Negative for drooling, hearing loss, trouble swallowing and voice change.    Eyes: Negative for pain and visual disturbance.   Respiratory: Negative for cough, shortness of breath and wheezing.    Cardiovascular: Negative for chest pain and palpitations.   Gastrointestinal: Negative for abdominal distention, nausea and vomiting.   Genitourinary: Negative for difficulty urinating and flank pain.   Musculoskeletal: Positive for gait problem. Negative for arthralgias, back pain, myalgias and neck pain.   Skin: Negative for rash and wound.   Neurological: Positive  for weakness and numbness. Negative for dizziness and headaches.   Psychiatric/Behavioral: Negative for agitation and hallucinations. The patient is not nervous/anxious.      Objective:     Vital Signs (Most Recent):  Temp: 99 °F (37.2 °C) (18 0701)  Pulse: 77 (18 1236)  Resp: 18 (18 1236)  BP: 136/71 (18 1100)  SpO2: 95 % (18 1236)    Vital Signs (24h Range):  Temp:  [97.8 °F (36.6 °C)-99 °F (37.2 °C)] 99 °F (37.2 °C)  Pulse:  [73-88] 77  Resp:  [11-25] 18  SpO2:  [94 %-99 %] 95 %  BP: (135-178)/(62-82) 136/71     Body mass index is 51.67 kg/m².    Physical Exam   Constitutional: She is oriented to person, place, and time. She appears well-developed and well-nourished. No distress.   HENT:   Head: Normocephalic and atraumatic.   Right Ear: External ear normal.   Left Ear: External ear normal.   Nose: Nose normal.   Eyes: Right eye exhibits no discharge. Left eye exhibits no discharge. No scleral icterus.   Neck: Normal range of motion.   Cardiovascular: Normal rate, regular rhythm and intact distal pulses.    Pulmonary/Chest: Effort normal. No respiratory distress. She has no wheezes.   Abdominal: Soft. She exhibits no distension. There is no tenderness.   Musculoskeletal: Normal range of motion. She exhibits no edema or tenderness.   BLE edema   Neurological: She is alert and oriented to person, place, and time. She exhibits normal muscle tone.   -  Mental Status:  AAOx3.  Follows commands.  Answers correct age and .  -  Speech and language:  no aphasia or dysarthria.      -  Facial movement (CN VII): symmetrical.   -  Motor:  RUE: 4/5.  LUE: /.  RLE: /.  LLE: /.  -  Sensory:  Intact to light touch and pin prick.   Skin: Skin is warm and dry. No rash noted.   Psychiatric: She has a normal mood and affect. Her behavior is normal. Thought content normal.   Vitals reviewed.    NEUROLOGICAL EXAMINATION:     MENTAL STATUS   Oriented to person, place, and time.       Diagnostic  Results:   Labs: Reviewed  X-Ray: Reviewed  CT: Reviewed  CTA: Reviewed

## 2018-06-12 NOTE — PLAN OF CARE
Problem: SLP Goal  Goal: SLP Goal  Speech Language Pathology Goals  Goals expected to be met by 6/18  1. Pt will participate in ongoing swallow assessment. -Goal Met  2. Pt will participate in speech/language/cognitive evaluation s/p stroke. -Goal Met       Outcome: Outcome(s) achieved Date Met: 06/12/18  Speech Language Cognitive Evaluation completed.  Pt presents at baseline function per SLP eval and family report.  Pt also w/ improved oropharyngeal swallow.  REC:  Pt resume po intake w/ Dental soft diet w/ thin liquids, oral meds whole, aspiration precautions.  Further Skilled Speech services are not indicated at this time.  ST to s/o.  Thank you.    Maggie Pfeiffer CCC-SLP  6/12/2018

## 2018-06-12 NOTE — ASSESSMENT & PLAN NOTE
-Stroke risk factor   A1c 10.5%  Needs tight glucose control  Recommend Endocrinology consult prior to d/c for regimen planning

## 2018-06-12 NOTE — SUBJECTIVE & OBJECTIVE
Neurologic Chief Complaint: AMS    Subjective:     Interval History: Patient is seen for follow-up neurological assessment and treatment recommendations: ROBEON. Pt approved for diet; reporting improvement in previous R hand numbness. Cardene off. Pt will step down if SBP remains stable this afternoon.    HPI, Past Medical, Family, and Social History remains the same as documented in the initial encounter.     Review of Systems   Constitutional: Negative for chills and fever.   Eyes: Negative for discharge and visual disturbance.   Neurological: Positive for facial asymmetry. Negative for speech difficulty, weakness and numbness.   Psychiatric/Behavioral: Negative for agitation and behavioral problems.     Scheduled Meds:   albuterol-ipratropium  3 mL Nebulization Q6H    [START ON 6/13/2018] aspirin  81 mg Oral Daily    atorvastatin  40 mg Per NG tube Daily    buPROPion  75 mg Oral TID    carvedilol  6.25 mg Oral BID    famotidine  20 mg Oral BID    fluticasone  2 spray Each Nare Daily    furosemide  20 mg Intravenous Daily    heparin (porcine)  5,000 Units Subcutaneous Q12H    hydrALAZINE  50 mg Per NG tube Q8H    insulin aspart U-100  4 Units Subcutaneous TIDWM    senna-docusate 8.6-50 mg  1 tablet Per NG tube Daily    sodium chloride 0.9%  3 mL Intravenous Q8H     Continuous Infusions:    PRN Meds:acetaminophen, benzonatate, dextrose 50%, dextrose 50%, glucagon (human recombinant), glucose, glucose, insulin aspart U-100, labetalol    Objective:     Vital Signs (Most Recent):  Temp: 99.1 °F (37.3 °C) (06/12/18 1100)  Pulse: 78 (06/12/18 1545)  Resp: (!) 26 (06/12/18 1545)  BP: 137/63 (06/12/18 1300)  SpO2: 95 % (06/12/18 1545)  BP Location: Left arm    Vital Signs Range (Last 24H):  Temp:  [97.8 °F (36.6 °C)-99.1 °F (37.3 °C)]   Pulse:  [73-88]   Resp:  [11-26]   BP: (135-178)/(62-77)   SpO2:  [94 %-99 %]   BP Location: Left arm    Physical Exam   Constitutional: She is oriented to person, place, and  time. She appears well-developed and well-nourished. No distress.   HENT:   Head: Normocephalic and atraumatic.   Eyes: Conjunctivae and EOM are normal.   Cardiovascular: Normal rate.    Pulmonary/Chest: Effort normal. No respiratory distress.   Musculoskeletal: She exhibits no tenderness or deformity.   Neurological: She is alert and oriented to person, place, and time. A cranial nerve deficit is present. No sensory deficit.   Mild intermittent asterixis of L hand   Skin: Skin is warm and dry.   Psychiatric: She has a normal mood and affect. Her behavior is normal.       Neurological Exam:   LOC: alert  Attention Span: Good   Language: No aphasia  Articulation: No obvious dysarthria noted, in setting of pt's native language/accent  Orientation: Person, Age, Time  Visual Fields: Full  EOM (CN III, IV, VI): Full/intact  Facial Movement (CN VII): Lower facial weakness on the Right  Motor: Arm left  Normal 5/5  Leg left  Paresis: 4/5  Arm right  Normal 5/5  Leg right Paresis: 4/5  Sensation: Intact to light touch, temp; R hand tingling resolved  Tone: Normal tone throughout    Laboratory:  CMP:     Recent Labs  Lab 06/12/18  0205   CALCIUM 8.6*   ALBUMIN 2.5*   PROT 5.8*   *   K 4.2   CO2 30*      BUN 25*   CREATININE 1.1   ALKPHOS 83   ALT 11   AST 12   BILITOT 0.6     CBC:     Recent Labs  Lab 06/12/18  0205   WBC 14.34*   RBC 3.87*   HGB 11.7*   HCT 37.7      MCV 97   MCH 30.2   MCHC 31.0*     Lipid Panel:     Recent Labs  Lab 06/10/18  2006   CHOL 148   LDLCALC 83.4   HDL 47   TRIG 88     Coagulation:   Recent Labs  Lab 06/07/18  0733  06/10/18  2006   INR  --   < > 1.1   APTT 28.1  --   --    < > = values in this interval not displayed.  Platelet Aggregation Study: No results for input(s): PLTAGG, PLTAGINTERP, PLTAGREGLACO, ADPPLTAGGREG in the last 168 hours.  Hgb A1C:     Recent Labs  Lab 06/10/18  2006   HGBA1C 10.5*     TSH:     Recent Labs  Lab 06/10/18  2006   TSH 1.535       Diagnostic  Results     Brain Imaging     CT Head 6/12/18  Evolving hypoattenuation left inferior frontal lobe somewhat more apparent on today's examination cannot exclude area vaulting infarction overall age indeterminate somewhat limited by motion.  There is no evidence for acute intracranial hemorrhage or is hydrocephalus.    CT Head 6/10/18 -- [facility]  Inferior left frontal lobe more conspicuous area of hypoattenuation without significant volume loss, which may be related to artifact versus acute/subacute infarct. Further evaluation/follow-up as warranted.  Age-related mild senescent changes as above.  Right sphenoid sinus disease.      Vessel Imaging     CTA Multiphase 6/10/18  Redemonstration of a left inferior frontal area of hypoattenuation similar to the prior exam without evidence for significant progression.  This may represent a acute or remote lacunar infarction.  Further evaluation with MRI if clinically warranted.  Subtle decrease in prominence of the right MCA vascularity on the 1st phase of imaging in comparison the left with fill-in on the 2nd phase of imaging.  No occlusion is seen.  Right upper lobe 5 mm solid pulmonary nodule.  For a solid nodule <6 mm, Fleischner Society 2017 guidelines recommend no routine follow up for a low risk patient, or follow-up with non-contrast chest CT at 12 months in a high risk patient.  Mid to upper esophageal distension with intraluminal gas and fluid contents, which may be related to reflux versus soft geode dysmotility, and could place patient at risk for aspiration.  Bilateral upper lobe mild nonspecific peribronchial thickening with centrilobular patchy ground-glass attenuation which can be seen with small vessel disease including pulmonary edema, small airways disease with early changes of bronchopneumonia, or aspiration.  Further evaluation/follow-up as warranted.      Cardiac Imaging     Echo w/ CFD 6/11/18  CONCLUSIONS  Normal LA     1 - Normal left ventricular systolic  function (EF 55-60%).     2 - Normal left ventricular diastolic function.     3 - Normal right ventricular systolic function.

## 2018-06-12 NOTE — PROGRESS NOTES
[facility]  Vascular Neurology  Comprehensive Stroke Center  Progress Note    Assessment/Plan:     * Embolic stroke involving left middle cerebral artery    72 y.o. female with significant past medical history of COPD, DM II, HTN, CKD III, and hx of MI presented to hospital as a transfer from [facility] for evaluation of R MCA stroke symptoms. Received tPA. No LVO, no intervention.    CT with Left inferior frontal area of hypoattenuation which may represent acute or remote infarction. Pt with PM, so unable to obtain MRI to further characterize. Weakness is improved on exam 6/11. NPO per SLP.  Etiology of pt's presentation currently remains unclear.      Antithrombotics for secondary stroke prevention: Antiplatelets: Aspirin: 325 mg daily  Statins for secondary stroke prevention and hyperlipidemia, if present:   Statins: Atorvastatin- 40 mg daily  Aggressive risk factor modification: HTN, DM, HLD, Diet, Exercise, Obesity  Rehab efforts: PT/OT/SLP to evaluate and treat   Dispo: SNF  Diagnostics ordered/pending: None   VTE prophylaxis: Heparin 5000 units SQ every 8 hours  BP parameters: Infarct: Post tPA, SBP <180        Received tissue plasminogen activator (t-PA) less than 24 hours prior to arrival    -Admitted to [facility] for close monitoring.        Esophageal dysphagia    Mid to upper esophageal distension seen on CTA  Could place patient at risk for aspiration  Pt currently NPO  SLP following        Cytotoxic cerebral edema    Upon review of brain imaging, small area of cytotoxic cerebral edema identified in the territory of the Left anterior cerebral artery. There is not associated mass effect.   We will continue to monitor the patients clinical exam for any worsening of symptoms which may indicate expansion of the stroke or the area of edema resulting in such clinical change.   Pattern is suggestive of an etiology that remains unclear at this time.        Mixed hyperlipidemia    Stroke risk  factor  LDL 83  Atorvastatin 40        Type 2 diabetes mellitus, with long-term current use of insulin    -Stroke risk factor   A1c 10.5%  -Recommend tight glucose control  Consider Endocrinology consult for d/c planning        Essential hypertension    -Stroke risk factor  SBP<180 s/p tPA  Pt remains on Cardene        COPD exacerbation    -Admitted to OSH for asthma exacerbation.  Remains w/intermittent, productive cough.  -Managed by primary team.        Pulmonary nodule    Pt with hx COPD  RUL 5 mm solid pulmonary nodule  For a solid nodule <6 mm, Fleischner Society 2017 guidelines recommend  follow-up with non-contrast chest CT at 12 months in a high risk patient             6/11/18: Pt desatted to 70s% during swallow/SLP evaluation; remains NPO. NGT to be placed so pt can get today's ASA dose. On Cardene.    STROKE DOCUMENTATION   Acute Stroke Times   Last Known Normal Date: 06/10/18  Last Known Normal Time: 1215  Symptom Onset Date: 06/10/18  Symptom Onset Time: 1215  Stroke Team Called Date: 06/10/18  Stroke Team Arrival Date: 06/10/18  CT Interpretation Time: 1325  Decision to Treat Time for Alteplase: 1345    NIH Scale:  1a. Level Of Consciousness: 1-->Not alert: but arousable by minor stimulation to obey, answer, or respond  1b. LOC Questions: 0-->Answers both questions correctly  1c. LOC Commands: 0-->Performs both tasks correctly  2. Best Gaze: 0-->Normal  3. Visual: 0-->No visual loss  4. Facial Palsy: 1-->Minor paralysis (flattened nasolabial fold, asymmetry on smiling)  5a. Motor Arm, Left: 0-->No drift: limb holds 90 (or 45) degrees for full 10 secs  5b. Motor Arm, Right: 0-->No drift: limb holds 90 (or 45) degrees for full 10 secs  6a. Motor Leg, Left: 1-->Drift: leg falls by the end of the 5-sec period but does not hit bed  6b. Motor Leg, Right: 1-->Drift: leg falls by the end of the 5-sec period but does not hit bed  7. Limb Ataxia: 0-->Absent  8. Sensory: 1-->Mild-to-moderate sensory loss:  patient feels pinprick is less sharp or is dull on the affected side: or there is a loss of superficial pain with pinprick, but patient is aware of being touched  9. Best Language: 0-->No aphasia: normal  10. Dysarthria: 0-->Normal  11. Extinction and Inattention (formerly Neglect): 0-->No abnormality  Total (NIH Stroke Scale): 5       Modified Pinellas Score: 1  Dresden Coma Scale:    ABCD2 Score:    [id number] Score:   HAS -BLED Score:   ICH Score:   Hunt & Diehl Classification:      Hemorrhagic change of an Ischemic Stroke: Does this patient have an ischemic stroke with hemorrhagic changes? No     Neurologic Chief Complaint: AMS    Subjective:     Interval History: Patient is seen for follow-up neurological assessment and treatment recommendations: NAEON. Pt desatted to 70s% during swallow/SLP evaluation; remains NPO. NGT to be placed so pt can get today's ASA dose. On Cardene.    HPI, Past Medical, Family, and Social History remains the same as documented in the initial encounter.     Review of Systems   Constitutional: Negative for chills and fever.   Eyes: Negative for discharge and visual disturbance.   Neurological: Positive for facial asymmetry, weakness and numbness.   Psychiatric/Behavioral: Negative for agitation and behavioral problems.     Scheduled Meds:   albuterol-ipratropium  3 mL Nebulization Q6H    aspirin  325 mg Per NG tube Daily    [START ON 6/12/2018] atorvastatin  40 mg Per NG tube Daily    fluticasone  2 spray Each Nare Daily    furosemide  20 mg Intravenous Daily    heparin (porcine)  5,000 Units Subcutaneous Q8H    hydrALAZINE  25 mg Per NG tube Q8H    [START ON 6/12/2018] senna-docusate 8.6-50 mg  1 tablet Per NG tube Daily    sodium chloride 0.9%  3 mL Intravenous Q8H     Continuous Infusions:   sodium chloride 0.9% 75 mL/hr at 06/11/18 1800    nicardipine 5 mg/hr (06/11/18 1800)     PRN Meds:acetaminophen, benzonatate, dextrose 50%, dextrose 50%, glucagon (human recombinant),  glucose, glucose, insulin aspart U-100, labetalol    Objective:     Vital Signs (Most Recent):  Temp: 98.6 °F (37 °C) (06/11/18 1100)  Pulse: 73 (06/11/18 1700)  Resp: 15 (06/11/18 1800)  BP: (!) 176/74 (06/11/18 1700)  SpO2: 98 % (06/11/18 1700)  BP Location: Right arm    Vital Signs Range (Last 24H):  Temp:  [97.4 °F (36.3 °C)-98.6 °F (37 °C)]   Pulse:  [72-85]   Resp:  [15-43]   BP: (129-187)/()   SpO2:  [93 %-98 %]   BP Location: Right arm    Physical Exam   Constitutional: She is oriented to person, place, and time. She appears well-developed and well-nourished. No distress.   HENT:   Head: Normocephalic and atraumatic.   Eyes: Conjunctivae and EOM are normal.   Cardiovascular: Normal rate.    Pulmonary/Chest: Effort normal. No respiratory distress.   Musculoskeletal: She exhibits no tenderness or deformity.   Neurological: She is alert and oriented to person, place, and time. A cranial nerve deficit and sensory deficit (R hand numbness) is present.   Mild intermittent asterixis of L hand   Skin: Skin is warm and dry.   Psychiatric: She has a normal mood and affect. Her behavior is normal.       Neurological Exam:   LOC: drowsy  Attention Span: Good   Language: No aphasia, Exam somewhat limited by language deficit  Articulation: No obvious dysarthria noted, accounting for different language spoken  Orientation: Person, Age, Time  Visual Fields: Full  EOM (CN III, IV, VI): Full/intact  Facial Movement (CN VII): Lower facial weakness on the Right  Motor: Arm left  Normal 5/5  Leg left  Paresis: 3/5  Arm right  Paresis: 4/5  Leg right Paresis: 3/5  Sensation: Intact to light touch, temp; Reported tingling in the R hand  Tone: Normal tone throughout    Laboratory:  CMP:   Recent Labs  Lab 06/11/18  0246   CALCIUM 9.0   ALBUMIN 2.6*   PROT 6.2      K 4.4   CO2 27      BUN 35*   CREATININE 1.3   ALKPHOS 93   ALT 14   AST 13   BILITOT 0.5     CBC:   Recent Labs  Lab 06/11/18  0246   WBC 18.80*   RBC  4.44   HGB 13.3   HCT 42.4      MCV 96   MCH 30.0   MCHC 31.4*     Lipid Panel:   Recent Labs  Lab 06/10/18  2006   CHOL 148   LDLCALC 83.4   HDL 47   TRIG 88     Coagulation:   Recent Labs  Lab 06/07/18  0733  06/10/18  2006   INR  --   < > 1.1   APTT 28.1  --   --    < > = values in this interval not displayed.  Platelet Aggregation Study: No results for input(s): PLTAGG, PLTAGINTERP, PLTAGREGLACO, ADPPLTAGGREG in the last 168 hours.  Hgb A1C:   Recent Labs  Lab 06/10/18  2006   HGBA1C 10.5*     TSH:   Recent Labs  Lab 06/10/18  2006   TSH 1.535       Diagnostic Results     Brain Imaging     CT Head 6/10/18 -- OSH  Inferior left frontal lobe more conspicuous area of hypoattenuation without significant volume loss, which may be related to artifact versus acute/subacute infarct. Further evaluation/follow-up as warranted.  Age-related mild senescent changes as above.  Right sphenoid sinus disease.      Vessel Imaging     CTA Multiphase 6/10/18  Redemonstration of a left inferior frontal area of hypoattenuation similar to the prior exam without evidence for significant progression.  This may represent a acute or remote lacunar infarction.  Further evaluation with MRI if clinically warranted.  Subtle decrease in prominence of the right MCA vascularity on the 1st phase of imaging in comparison the left with fill-in on the 2nd phase of imaging.  No occlusion is seen.  Right upper lobe 5 mm solid pulmonary nodule.  For a solid nodule <6 mm, Fleischner Society 2017 guidelines recommend no routine follow up for a low risk patient, or follow-up with non-contrast chest CT at 12 months in a high risk patient.  Mid to upper esophageal distension with intraluminal gas and fluid contents, which may be related to reflux versus soft geode dysmotility, and could place patient at risk for aspiration.  Bilateral upper lobe mild nonspecific peribronchial thickening with centrilobular patchy ground-glass attenuation which can be  seen with small vessel disease including pulmonary edema, small airways disease with early changes of bronchopneumonia, or aspiration.  Further evaluation/follow-up as warranted.      Cardiac Imaging     Echo w/ CFD 6/11/18  CONCLUSIONS  Normal LA     1 - Normal left ventricular systolic function (EF 55-60%).     2 - Normal left ventricular diastolic function.     3 - Normal right ventricular systolic function.      Norah Gautam PA-C  Comprehensive Stroke Center  Department of Vascular Neurology   [facility]

## 2018-06-12 NOTE — PROGRESS NOTES
[facility]  Neurocritical Care  Progress Note    Admit Date: 6/10/2018  Service Date: 06/12/2018  Length of Stay: 2    Subjective:     Chief Complaint: Embolic stroke involving left middle cerebral artery    History of Present Illness: Ms. Christian is a 71 yo  female with COPD, DM 2, HTN, hx MI, CKD 3 and reported pacemaker per chart, who was admitted at [facility] for SOB  accompanied by abdominal pain, chest pain, and productive cough and dysuria as well as vaginal bleeding x 1 month (hx of uterine cancer, follows gynecologist outpatient). Admitted for what sounds like CHF as well as copd exacerbation and treated accordingly. (see medicine note from [facility] for more details in this treatment).     Sent to us after tpa given when she had acute onset of drowsiness and aphasia earlier today. (Time of tpa not found in documentation and nursing report given to ED RN who has not communicated transition of care to NCC RN).    On exam she is arousable to voice, follows commands intermittently and has RUE drift. CTAMP without LVO. Admitted to [facility] for post tpa monitoring .               Hospital Course: 6/11: some asterixis noted left hand by nurse as well as paresthesia right arm likely reflecting left subcortical parietal/thalamic involvement by stroke, asa started  6/12- off cardene, CT head at 8 am with evolving left MCA territory stroke, no hemorrhage - exam remains improved and sputum production less frequent. On minimal home o2. Stable for ttf to stroke service      Interval History: CT head , evolving L inferior frontal stroke , no new mass effect.     Review of Systems: Constitutional: Denies fevers or chills.  Pulmonary: Denies shortness of breath or cough.  Cardiology: Denies chest pain or palpitations.  GI: Denies abdominal pain or constipation.  Neurologic: Denies new weakness, headache, or paresthesias.    Vitals:   Temp: 99 °F (37.2 °C)  Pulse: 77  Rhythm: paced rhythm  BP:  136/71  MAP (mmHg): 95  Resp: 18  SpO2: 95 %  O2 Device (Oxygen Therapy): nasal cannula w/ humidification    Temp  Min: 97.8 °F (36.6 °C)  Max: 99 °F (37.2 °C)  Pulse  Min: 73  Max: 88  BP  Min: 135/62  Max: 178/77  MAP (mmHg)  Min: 78  Max: 115  Resp  Min: 11  Max: 25  SpO2  Min: 94 %  Max: 99 %    06/11 0701 - 06/12 0700  In: 2050 [I.V.:2050]  Out: 300 [Urine:300]   Unmeasured Output  Urine Occurrence: 2  Pad Count: 2     Examination:   Constitutional: Well-nourished and -developed. No apparent distress.   Eyes: Conjunctiva clear, anicteric. Lids no lesions.  Head/Ears/Nose/Mouth/Throat/Neck: Moist mucous membranes. External ears, nose atraumatic.   Cardiovascular: Regular rhythm. No murmurs. No leg edema.  Respiratory: Comfortable respirations. Clear to auscultation , wheezes mild  Gastrointestinal: No hernia. Soft, nondistended, nontender. + bowel sounds.    Neurologic:  -GCS [id number]  -Alert. Oriented to person, place, and time. Speech fluent. But intermittent confusion.  Follows commands.  -Cranial nerves intact, except R lower facial weakness  -Motor 5/5 except RUE has slight drift.   -Sensation intact, denies new paresthesias      Medications:   Continuous Scheduled  albuterol-ipratropium 3 mL Q6H   [START ON 6/13/2018] aspirin 81 mg Daily   atorvastatin 40 mg Daily   buPROPion 75 mg TID   carvedilol 6.25 mg BID   famotidine 20 mg BID   fluticasone 2 spray Daily   furosemide 20 mg Daily   heparin (porcine) 5,000 Units Q12H   hydrALAZINE 50 mg Q8H   insulin aspart U-100 4 Units TIDWM   senna-docusate 8.6-50 mg 1 tablet Daily   sodium chloride 0.9% 3 mL Q8H   PRN  acetaminophen 650 mg Q6H PRN   benzonatate 100 mg TID PRN   dextrose 50% 12.5 g PRN   dextrose 50% 25 g PRN   glucagon (human recombinant) 1 mg PRN   glucose 16 g PRN   glucose 24 g PRN   insulin aspart U-100 1-10 Units QID (AC + HS) PRN   labetalol 10 mg Q15 Min PRN      Today I independently reviewed pertinent medications, lines/drains/airways,  imaging, lab results, notably:   CT head this morning :   Evolving hypoattenuation left inferior frontal lobe somewhat more apparent on today's examination cannot exclude area vaulting infarction overall age indeterminate somewhat limited by motion.  Assessment/Plan:     Neuro   * Embolic stroke involving left middle cerebral artery    --admitted to [facility] post tpa  --no LVO  --vascular neuro following  --PTOTSlp  --atorvastatin, aspirin, sqh started  --sbp <180  --NS IVF held, diet started- swallowing well.   --f/u echo, most recent EF 55%  --patient has pacemaker, unable to get MR  --CT head without interval worsening, ttf        Pulmonary   COPD exacerbation    --continue duonebs and add ICS if needed  --sputum production less today, comfortable on home o2 NC          Cardiac/Vascular   Essential hypertension    Cardene off ,continue oral antihypertensives        Endocrine   Type 2 diabetes mellitus, with long-term current use of insulin    --SSI while npo  --a1c 10  -starting scheduled insulin TIDWM now that diet starting.         Other   Elevated serum creatinine    --JOSE, lasix started for pulmonary congestion and elevated bnp, but dc'd before transfer due to jose.   creatinene continuing trend downward  Follow daily chemistry            Prophylaxis:  Venous Thromboembolism: mechanical chemical  Stress Ulcer: H2B  Ventilator Pneumonia: not applicable     Activity Orders          None        Full Code    Mara Fraga PA-C  Neurocritical Care  [facility]

## 2018-06-12 NOTE — HPI
Franklin Christian is a 72-year-old female with PMHx of HTN, HLD, DMII, CAD with remote MI, s/p pacemaker, COPD and CKD III.  Patient presented to [facility] on 6/7/18 for severe SOB unrelieved by inhaler.  On arrival, she reported difficulty breathing x 1 week with associated decreased appetite, productive cough, chest pain, dysuria, and back and neck pain.  On arrival, found to be hypoxic with elevated BNP of 873 and pulmonary vascular congestion on CXR.   Treated with Lasix 40 mg IV, duonebs, and solumedrol and placed on supplemental oxygen.  She was then admitted to hospital medicine for COPD exacerbation and pulmonary edema.  Hospital course complicated by hyperglycemia improved with insulin, JOSE improved when lasix discontinued, and debility.  On 6/10/18, patient found with AMS and anisocoria.  Tele-stroke consult completed.  CTH concerning for L frontal infarct, and IV tPA administered.  She was then transferred to [facility] on 6/10/18 for further evaluation and management.  Upon admission, CTA multiphase without large vessel occlusion; therefore, intervention not indicated.  Unable to obtain MRI 2/2 pacemaker.  Repeat CTH concerning for L inferior frontal lobe infarct.  Etiology likely vessel disease.     Functional History: Patient lives in [place] with a roommate in a duplex story home with one step to enter.  Prior to admission, she was (I) with ADLs and mobility.  DME: [employer], [employer].  Daughter lives in  and is able to assist with care at discharge.

## 2018-06-12 NOTE — PLAN OF CARE
Problem: Patient Care Overview  Goal: Plan of Care Review  Outcome: Ongoing (interventions implemented as appropriate)  POC reviewed with pt and pt's daughter at 0500. Pt verbalized understanding. Daughter used as a  for the patient. Questions and concerns addressed. No acute events overnight. Pt progressing toward goals. Will continue to monitor. See flowsheets for full assessment and VS info

## 2018-06-12 NOTE — PLAN OF CARE
SW met with Pt with interpretor at bedside. Discussed SNF in [place] vs [place]. She reported she is fine with whatever her daughter decides. Left [place] SNF list at bedside.    SW met with Pt and Pt daughter/granddaughter at bedside. Addressed questions about SNF and Dc planning. If possible, she would like to go to Pt home with her and HH. Pt reported she is looking for another home also or for help with housing. Discussed that Pt may require a lot of care. Family will be okay with SNF if that is the case. [facility], or the two on [place]. SW to reseach those and send via .    Maryse Ramey LMSW  Neurocritical Care   [facility]  [id number]

## 2018-06-12 NOTE — CONSULTS
[facility]  Physical Medicine & Rehab  Consult Note    Patient Name: Franklin Christian  MRN: [id number]  Admission Date: 6/10/2018  Hospital Length of Stay: 2 days  Attending Physician: Augusto Xie MD     Inpatient consult to Physical Medicine & Rehabilitation  Consult performed by: Nasrin Baez NP  Consult requested by:  Augusto Xie MD    Collaborating Physician: Vesta Mcleod MD  Reason for Consult:  assess rehabilitation needs    Consults  Subjective:     Principal Problem: Embolic stroke involving left middle cerebral artery    HPI: Franklin Christian is a 72-year-old female with PMHx of HTN, HLD, DMII, CAD with remote MI, s/p pacemaker, COPD and CKD III.  Patient presented to [facility] on 6/7/18 for severe SOB unrelieved by inhaler.  On arrival, she reported difficulty breathing x 1 week with associated decreased appetite, productive cough, chest pain, dysuria, and back and neck pain.  On arrival, found to be hypoxic with elevated BNP of 873 and pulmonary vascular congestion on CXR.   Treated with Lasix 40 mg IV, duonebs, and solumedrol and placed on supplemental oxygen.  She was then admitted to hospital medicine for COPD exacerbation and pulmonary edema.  Hospital course complicated by hyperglycemia improved with insulin, JOSE improved when lasix discontinued, and debility.  On 6/10/18, patient found with AMS and anisocoria.  Tele-stroke consult completed.  CTH concerning for L frontal infarct, and IV tPA administered.  She was then transferred to [facility] on 6/10/18 for further evaluation and management.  Upon admission, CTA multiphase without large vessel occlusion; therefore, intervention not indicated.  Unable to obtain MRI 2/2 pacemaker.  Repeat CTH concerning for L inferior frontal lobe infarct.  Etiology likely vessel disease.     Functional History: Patient lives in [place] with a roommate in a duplex story home with one step to enter.  Prior to admission, she was (I) with  ADLs and mobility.  DME: [facility] [employer].  Daughter lives in  and is able to assist with care at discharge.     Hospital Course:   6/11/18:  Evaluated by PT, OT, and SLP.  Found to have dysphagia.  SLP recommendation: NPO.  Bed mobility mod(I)-modA.  Sit to stand Arturo and transfers Arturo.  Ambulated 2-3 steps Arturo.  LBD totalA.  6/12/18:  Participated with SLP.  Speech, language, and cognitive skills WFL/at baseline.  SLP recommendation: dental soft diet and thin liquids.    Past Medical History:   Diagnosis Date    Diabetes mellitus     Hypertension     MI, old     Renal disorder      Past Surgical History:   Procedure Laterality Date    CARDIAC PACEMAKER PLACEMENT      CARDIAC PACEMAKER PLACEMENT      HYSTERECTOMY      SHOULDER SURGERY       Review of patient's allergies indicates:  No Known Allergies    Scheduled Medications:    albuterol-ipratropium  3 mL Nebulization Q6H    [START ON 6/13/2018] aspirin  81 mg Oral Daily    atorvastatin  40 mg Per NG tube Daily    buPROPion  75 mg Oral TID    carvedilol  6.25 mg Oral BID    famotidine  20 mg Oral BID    fluticasone  2 spray Each Nare Daily    furosemide  20 mg Intravenous Daily    heparin (porcine)  5,000 Units Subcutaneous Q12H    hydrALAZINE  50 mg Per NG tube Q8H    insulin aspart U-100  4 Units Subcutaneous TIDWM    senna-docusate 8.6-50 mg  1 tablet Per NG tube Daily    sodium chloride 0.9%  3 mL Intravenous Q8H       PRN Medications: acetaminophen, benzonatate, dextrose 50%, dextrose 50%, glucagon (human recombinant), glucose, glucose, insulin aspart U-100, labetalol    Family History     None        Social History Main Topics    Smoking status: Former Smoker    Smokeless tobacco: Never Used    Alcohol use No    Drug use: No    Sexual activity: Not on file     Review of Systems   Constitutional: Negative for chills, fatigue and fever.   HENT: Negative for drooling, hearing loss, trouble swallowing and voice change.    Eyes: Negative for  pain and visual disturbance.   Respiratory: Negative for cough, shortness of breath and wheezing.    Cardiovascular: Negative for chest pain and palpitations.   Gastrointestinal: Negative for abdominal distention, nausea and vomiting.   Genitourinary: Negative for difficulty urinating and flank pain.   Musculoskeletal: Positive for gait problem. Negative for arthralgias, back pain, myalgias and neck pain.   Skin: Negative for rash and wound.   Neurological: Positive for weakness and numbness. Negative for dizziness and headaches.   Psychiatric/Behavioral: Negative for agitation and hallucinations. The patient is not nervous/anxious.      Objective:     Vital Signs (Most Recent):  Temp: 99 °F (37.2 °C) (18 0701)  Pulse: 77 (18 1236)  Resp: 18 (18 1236)  BP: 136/71 (18 1100)  SpO2: 95 % (18 1236)    Vital Signs (24h Range):  Temp:  [97.8 °F (36.6 °C)-99 °F (37.2 °C)] 99 °F (37.2 °C)  Pulse:  [73-88] 77  Resp:  [11-25] 18  SpO2:  [94 %-99 %] 95 %  BP: (135-178)/(62-82) 136/71     Body mass index is 51.67 kg/m².    Physical Exam   Constitutional: She is oriented to person, place, and time. She appears well-developed and well-nourished. No distress.   HENT:   Head: Normocephalic and atraumatic.   Right Ear: External ear normal.   Left Ear: External ear normal.   Nose: Nose normal.   Eyes: Right eye exhibits no discharge. Left eye exhibits no discharge. No scleral icterus.   Neck: Normal range of motion.   Cardiovascular: Normal rate, regular rhythm and intact distal pulses.    Pulmonary/Chest: Effort normal. No respiratory distress. She has no wheezes.   Abdominal: Soft. She exhibits no distension. There is no tenderness.   Musculoskeletal: Normal range of motion. She exhibits no edema or tenderness.   BLE edema   Neurological: She is alert and oriented to person, place, and time. She exhibits normal muscle tone.   -  Mental Status:  AAOx3.  Follows commands.  Answers correct age and .  -   Speech and language:  no aphasia or dysarthria.      -  Facial movement (CN VII): symmetrical.   -  Motor:  RUE: 4/5.  LUE: 5/5.  RLE: 4/5.  LLE: 5/5.  -  Sensory:  Intact to light touch and pin prick.   Skin: Skin is warm and dry. No rash noted.   Psychiatric: She has a normal mood and affect. Her behavior is normal. Thought content normal.   Vitals reviewed.    Diagnostic Results:   Labs: Reviewed  X-Ray: Reviewed  CT: Reviewed  CTA: Reviewed    Assessment/Plan:     * Embolic stroke involving left middle cerebral artery    -  CTH concerning for L frontal infarct  -  S/p tPA  -  CTA multiphase without LVO--> intervention not indicated  -  Unable to obtain MRI 2/2 pacemaker  -  Repeat CTH concerning for L inferior frontal lobe infarct  -  Etiology likely vessel disease  See hospital course for functional, cognitive/speech/language, and nutrition/swallow status.      Recommendations  -  Encourage mobility, OOB in chair, and early ambulation as appropriate   -  PT/OT evaluate and treat  -  SLP speech and cognitive evaluate and treat  -  Monitor mood and sleep disturbances  -  Establish consistent sleep-wake cycle  -  Monitor for bowel and bladder dysfunction  -  Monitor for shoulder pain and subluxation  -  Monitor for spasticity  -  Monitor for and prevent skin breakdown and pressure ulcers  · Early mobility, repositioning/weight shifting every 20-30 minutes when sitting, turn patient every 2 hours, proper mattress/overlay and chair cushioning, pressure relief/heel protector boots  -  DVT prophylaxis:  ROBBIN, SCD        Esophageal dysphagia    -  SLP following--> recommending NPO        Pulmonary nodule    -  CTA showed RUL 5 mm solid pulmonary nodule        Cytotoxic cerebral edema    -  2/2 stroke        Patient with rehab goals, improving.  Will follow progress for final post-acute/rehab recommendation.    Thank you for your consult.     NOBLE Clinton  Department of Physical Medicine & Rehab  [facility]  [place]

## 2018-06-12 NOTE — ASSESSMENT & PLAN NOTE
Upon review of brain imaging, small area of cytotoxic cerebral edema identified in the territory of the Left anterior cerebral artery. There is not associated mass effect.   We will continue to monitor the patients clinical exam for any worsening of symptoms which may indicate expansion of the stroke or the area of edema resulting in such clinical change.   Pattern is suggestive of a small vessel etiology.

## 2018-06-12 NOTE — ASSESSMENT & PLAN NOTE
Pt with hx COPD  RUL 5 mm solid pulmonary nodule  For a solid nodule <6 mm, Fleischner Society 2017 guidelines recommend  follow-up with non-contrast chest CT at 12 months in a high risk patient

## 2018-06-12 NOTE — PT/OT/SLP EVAL
Speech Language Pathology Evaluation  Cognitive Communication  Discharge Summary    Patient Name:  Franklin Christian   MRN:  [id number]  Admitting Diagnosis: Embolic stroke involving left middle cerebral artery    Recommendations:     Recommendations:                General Recommendations:  Follow-up not indicated  Diet recommendations:  Dental Soft, Thin   Aspiration Precautions: HOB to 90 degrees, Meds whole 1 at a time and Standard aspiration precautions   General Precautions: Standard, aspiration, fall  Communication strategies:  none    History:     Past Medical History:   Diagnosis Date    Diabetes mellitus     Hypertension     MI, old     Renal disorder        Past Surgical History:   Procedure Laterality Date    CARDIAC PACEMAKER PLACEMENT      CARDIAC PACEMAKER PLACEMENT      HYSTERECTOMY      SHOULDER SURGERY         Social History: Patient lives with support from a Scientologist member.    Prior Intubation HX:  None this admission    Modified Barium Swallow: none this admission    Chest X-Rays: 6/12/18:  Monitoring leads overlie the chest.  Left chest wall cardiac pacing device    Patchy bibasilar airspace opacities right greater than left appear similar in comparison the prior exam.  No pneumothorax.  No pleural effusion.    The cardiac silhouette is stable.  The hilar and mediastinal contours are unremarkable.    Regional osseous structures are unchanged.    Prior diet: regular/soft per pt report    Occupation/hobbies/homemaking: Pt enjoys reading her Bible and participating in [employer] activities.      Subjective     Pt was awake and alert in NAD  Patient goals: to eat     Pain/Comfort:  · Pain Rating 1: 0/10  · Pain Rating Post-Intervention 1: 0/10    Objective:   Cognitive Status:    Arousal/Alertness Appropriate response to stimuli  Attention No obvious deficits observed WFL  Orientation Oriented x4  Memory WFL, Immediate Recall 100% and Delayed 66% indep up to 100% given min cues  Problem Solving  Sequencing 100% and Solutions 100%      Receptive Language:   Comprehension:      WFL  Questions Simple yes/no 100%  Complex yes/no 100%  Commands  two step basic commands 100%  multistep basic commands 100%    Pragmatics:    WFL    Expressive Language:  Verbal:    WFL across all domains of expression w/ no evidence of aphasia or word-finding      Motor Speech:  WFL    Voice:   WFL    Visual-Spatial:  WFL    Reading:   WFL     Written Expression:   WFL    Treatment: Pt was seated upright for optimal swallowing safety.  She was offered and accepted po trials of thin liquids by tsp and cup edge, tsp bites of puree and self fed bites of solids.  She tolerated all trials w/ adequate oral clearance and no overt signs of airway compromise.  She was provided education re: SLP role, diet recs, aspiration precautions and SLP POC.  She and her daughter indicated good understanding and agreed w/ recommendations.    Assessment:   Franklin Christian is a 72 y.o. female with an SLP diagnosis of stroke s/p tpa.  She presents with functional cognitive-linguistic and swallowing skills at this time.  No further Skilled Speech services are indicated.    Goals:    SLP Goals     Not on file          Multidisciplinary Problems (Resolved)        Problem: SLP Goal    Goal Priority Disciplines Outcome   SLP Goal   (Resolved)     SLP Outcome(s) achieved   Description:  Speech Language Pathology Goals  Goals expected to be met by 6/18  1. Pt will participate in ongoing swallow assessment. -Goal Met  2. Pt will participate in speech/language/cognitive evaluation s/p stroke. -Goal Met                         Plan:   · Patient to be seen:  5 x/week   · Plan of Care expires:  07/10/18  · Plan of Care reviewed with:  patient, daughter   · SLP Follow-Up:  No       Discharge recommendations:  Discharge Facility/Level Of Care Needs: nursing facility, skilled   Barriers to Discharge:  None    Time Tracking:   SLP Treatment Date:   06/12/18  Speech Start  Time:  0952  Speech Stop Time:  1026     Speech Total Time (min):  34 min    Billable Minutes: Eval 24  and Treatment Swallowing Dysfunction 10    Maggie Pfeiffer CCC-SLP  06/12/2018

## 2018-06-12 NOTE — ASSESSMENT & PLAN NOTE
--JOSE, lasix started for pulmonary congestion and elevated bnp, but dc'd before transfer due to jose.   creatinene continuing trend downward  Follow daily chemistry

## 2018-06-12 NOTE — PROGRESS NOTES
[facility]  Vascular Neurology  Comprehensive Stroke Center  Progress Note    Assessment/Plan:     * Embolic stroke involving left middle cerebral artery    72 y.o. female with significant past medical history of COPD, DM II, HTN, CKD III, and hx of MI presented to hospital as a transfer from [facility] for evaluation of R MCA stroke symptoms. Received tPA. No LVO, no intervention.    CTH with evolving hypoattenuation in the Left inferior frontal lobe which likely represents age-indeterminate infarction. Pt with PM, unable to obtain MRI to further characterize. Etiology likely small vessel disease (A1c 10.5%). Weakness much improved on exam 6/12.    Pt approved for diet per SLP. Cardene off. Pt will step down if SBP remains stable this afternoon.      Antithrombotics for secondary stroke prevention: Antiplatelets: Aspirin: 325 mg daily  Statins for secondary stroke prevention and hyperlipidemia, if present:   Statins: Atorvastatin- 40 mg daily  Aggressive risk factor modification: HTN, DM, HLD, Diet, Exercise, Obesity  Rehab efforts: PT/OT/SLP to evaluate and treat   Dispo: SNF vs home with daughter (CNA) and   Diagnostics ordered/pending: None   VTE prophylaxis: Heparin 5000 units SQ every 8 hours  BP parameters: Infarct: Post tPA, SBP <180        Received tissue plasminogen activator (t-PA) less than 24 hours prior to arrival    -Admitted to [facility] for close monitoring -- Completed        Esophageal dysphagia    Mid to upper esophageal distension seen on CTA  Could place patient at risk for aspiration  Approved for dental soft, thin liquid diet  SLP following        Cytotoxic cerebral edema    Upon review of brain imaging, small area of cytotoxic cerebral edema identified in the territory of the Left anterior cerebral artery. There is not associated mass effect.   We will continue to monitor the patients clinical exam for any worsening of symptoms which may indicate expansion of the stroke  or the area of edema resulting in such clinical change.   Pattern is suggestive of a small vessel etiology.        Mixed hyperlipidemia    Stroke risk factor  LDL 83  Atorvastatin 40        Type 2 diabetes mellitus, with long-term current use of insulin    -Stroke risk factor   A1c 10.5%  Needs tight glucose control  Recommend Endocrinology consult prior to d/c for regimen planning        Essential hypertension    -Stroke risk factor  SBP<180 s/p tPA  Now off Cardene; Will step down if BP remains stable        COPD exacerbation    -Admitted to OSH for asthma exacerbation.  Remains w/intermittent, productive cough.  -Managed by primary team.        Pulmonary nodule    Pt with hx COPD  RUL 5 mm solid pulmonary nodule  For a solid nodule <6 mm, Fleischner Society 2017 guidelines recommend  follow-up with non-contrast chest CT at 12 months in a high risk patient             6/11/18: Pt desatted to 70s% during swallow/SLP evaluation; remains NPO. NGT to be placed so pt can get today's ASA dose. On Cardene.  6/12: Pt approved for diet; reporting improvement in previous R hand numbness. Cardene off. Pt will step down if SBP remains stable this afternoon.    STROKE DOCUMENTATION   Acute Stroke Times   Last Known Normal Date: 06/10/18  Last Known Normal Time: 1215  Symptom Onset Date: 06/10/18  Symptom Onset Time: 1215  Stroke Team Called Date: 06/10/18  Stroke Team Arrival Date: 06/10/18  CT Interpretation Time: 1325  Decision to Treat Time for Alteplase: 1345    NIH Scale:  1a. Level Of Consciousness: 0-->Alert: keenly responsive  1b. LOC Questions: 0-->Answers both questions correctly  1c. LOC Commands: 0-->Performs both tasks correctly  2. Best Gaze: 0-->Normal  3. Visual: 0-->No visual loss  4. Facial Palsy: 1-->Minor paralysis (flattened nasolabial fold, asymmetry on smiling)  5a. Motor Arm, Left: 0-->No drift: limb holds 90 (or 45) degrees for full 10 secs  5b. Motor Arm, Right: 0-->No drift: limb holds 90 (or 45)  degrees for full 10 secs  6a. Motor Leg, Left: 0-->No drift: leg holds 30 degree position for full 5 secs  6b. Motor Leg, Right: 0-->No drift: leg holds 30 degree position for full 5 secs  7. Limb Ataxia: 0-->Absent  8. Sensory: 0-->Normal: no sensory loss  9. Best Language: 0-->No aphasia: normal  10. Dysarthria: 0-->Normal  11. Extinction and Inattention (formerly Neglect): 0-->No abnormality  Total (NIH Stroke Scale): 1       Modified Ada Score: 1  [place] Coma Scale:    ABCD2 Score:    [id number] Score:   HAS -BLED Score:   ICH Score:   Hunt & Diehl Classification:      Hemorrhagic change of an Ischemic Stroke: Does this patient have an ischemic stroke with hemorrhagic changes? No     Neurologic Chief Complaint: AMS    Subjective:     Interval History: Patient is seen for follow-up neurological assessment and treatment recommendations: NAEON. Pt approved for diet; reporting improvement in previous R hand numbness. Cardene off. Pt will step down if SBP remains stable this afternoon.    HPI, Past Medical, Family, and Social History remains the same as documented in the initial encounter.     Review of Systems   Constitutional: Negative for chills and fever.   Eyes: Negative for discharge and visual disturbance.   Neurological: Positive for facial asymmetry. Negative for speech difficulty, weakness and numbness.   Psychiatric/Behavioral: Negative for agitation and behavioral problems.     Scheduled Meds:   albuterol-ipratropium  3 mL Nebulization Q6H    [START ON 6/13/2018] aspirin  81 mg Oral Daily    atorvastatin  40 mg Per NG tube Daily    buPROPion  75 mg Oral TID    carvedilol  6.25 mg Oral BID    famotidine  20 mg Oral BID    fluticasone  2 spray Each Nare Daily    furosemide  20 mg Intravenous Daily    heparin (porcine)  5,000 Units Subcutaneous Q12H    hydrALAZINE  50 mg Per NG tube Q8H    insulin aspart U-100  4 Units Subcutaneous TIDWM    senna-docusate 8.6-50 mg  1 tablet Per NG tube  Daily    sodium chloride 0.9%  3 mL Intravenous Q8H     Continuous Infusions:    PRN Meds:acetaminophen, benzonatate, dextrose 50%, dextrose 50%, glucagon (human recombinant), glucose, glucose, insulin aspart U-100, labetalol    Objective:     Vital Signs (Most Recent):  Temp: 99.1 °F (37.3 °C) (06/12/18 1100)  Pulse: 78 (06/12/18 1545)  Resp: (!) 26 (06/12/18 1545)  BP: 137/63 (06/12/18 1300)  SpO2: 95 % (06/12/18 1545)  BP Location: Left arm    Vital Signs Range (Last 24H):  Temp:  [97.8 °F (36.6 °C)-99.1 °F (37.3 °C)]   Pulse:  [73-88]   Resp:  [11-26]   BP: (135-178)/(62-77)   SpO2:  [94 %-99 %]   BP Location: Left arm    Physical Exam   Constitutional: She is oriented to person, place, and time. She appears well-developed and well-nourished. No distress.   HENT:   Head: Normocephalic and atraumatic.   Eyes: Conjunctivae and EOM are normal.   Cardiovascular: Normal rate.    Pulmonary/Chest: Effort normal. No respiratory distress.   Musculoskeletal: She exhibits no tenderness or deformity.   Neurological: She is alert and oriented to person, place, and time. A cranial nerve deficit is present. No sensory deficit.   Mild intermittent asterixis of L hand   Skin: Skin is warm and dry.   Psychiatric: She has a normal mood and affect. Her behavior is normal.       Neurological Exam:   LOC: alert  Attention Span: Good   Language: No aphasia  Articulation: No obvious dysarthria noted, in setting of pt's native language/accent  Orientation: Person, Age, Time  Visual Fields: Full  EOM (CN III, IV, VI): Full/intact  Facial Movement (CN VII): Lower facial weakness on the Right  Motor: Arm left  Normal 5/5  Leg left  Paresis: 4/5  Arm right  Normal 5/5  Leg right Paresis: 4/5  Sensation: Intact to light touch, temp; R hand tingling resolved  Tone: Normal tone throughout    Laboratory:  CMP:     Recent Labs  Lab 06/12/18  0205   CALCIUM 8.6*   ALBUMIN 2.5*   PROT 5.8*   *   K 4.2   CO2 30*      BUN 25*    CREATININE 1.1   ALKPHOS 83   ALT 11   AST 12   BILITOT 0.6     CBC:     Recent Labs  Lab 06/12/18  0205   WBC 14.34*   RBC 3.87*   HGB 11.7*   HCT 37.7      MCV 97   MCH 30.2   MCHC 31.0*     Lipid Panel:     Recent Labs  Lab 06/10/18  2006   CHOL 148   LDLCALC 83.4   HDL 47   TRIG 88     Coagulation:   Recent Labs  Lab 06/07/18  0733  06/10/18  2006   INR  --   < > 1.1   APTT 28.1  --   --    < > = values in this interval not displayed.  Platelet Aggregation Study: No results for input(s): PLTAGG, PLTAGINTERP, PLTAGREGLACO, ADPPLTAGGREG in the last 168 hours.  Hgb A1C:     Recent Labs  Lab 06/10/18  2006   HGBA1C 10.5*     TSH:     Recent Labs  Lab 06/10/18  2006   TSH 1.535       Diagnostic Results     Brain Imaging     CT Head 6/12/18  Evolving hypoattenuation left inferior frontal lobe somewhat more apparent on today's examination cannot exclude area vaulting infarction overall age indeterminate somewhat limited by motion.  There is no evidence for acute intracranial hemorrhage or is hydrocephalus.    CT Head 6/10/18 -- OSH  Inferior left frontal lobe more conspicuous area of hypoattenuation without significant volume loss, which may be related to artifact versus acute/subacute infarct. Further evaluation/follow-up as warranted.  Age-related mild senescent changes as above.  Right sphenoid sinus disease.      Vessel Imaging     CTA Multiphase 6/10/18  Redemonstration of a left inferior frontal area of hypoattenuation similar to the prior exam without evidence for significant progression.  This may represent a acute or remote lacunar infarction.  Further evaluation with MRI if clinically warranted.  Subtle decrease in prominence of the right MCA vascularity on the 1st phase of imaging in comparison the left with fill-in on the 2nd phase of imaging.  No occlusion is seen.  Right upper lobe 5 mm solid pulmonary nodule.  For a solid nodule <6 mm, Fleischner Society 2017 guidelines recommend no routine  follow up for a low risk patient, or follow-up with non-contrast chest CT at 12 months in a high risk patient.  Mid to upper esophageal distension with intraluminal gas and fluid contents, which may be related to reflux versus soft geode dysmotility, and could place patient at risk for aspiration.  Bilateral upper lobe mild nonspecific peribronchial thickening with centrilobular patchy ground-glass attenuation which can be seen with small vessel disease including pulmonary edema, small airways disease with early changes of bronchopneumonia, or aspiration.  Further evaluation/follow-up as warranted.      Cardiac Imaging     Echo w/ CFD 6/11/18  CONCLUSIONS  Normal LA     1 - Normal left ventricular systolic function (EF 55-60%).     2 - Normal left ventricular diastolic function.     3 - Normal right ventricular systolic function.      Norah Gautam PA-C  Comprehensive Stroke Center  Department of Vascular Neurology   [facility]

## 2018-06-12 NOTE — ASSESSMENT & PLAN NOTE
--admitted to [facility] post tpa  --no LVO  --vascular neuro following  --PTOTSlp  --atorvastatin, aspirin, sqh started  --sbp <180  --NS IVF held, diet started- swallowing well.   --f/u echo, most recent EF 55%  --patient has pacemaker, unable to get MR  --CT head without interval worsening, ttf

## 2018-06-12 NOTE — ASSESSMENT & PLAN NOTE
Mid to upper esophageal distension seen on CTA  Could place patient at risk for aspiration  Approved for dental soft, thin liquid diet  SLP following

## 2018-06-12 NOTE — PLAN OF CARE
SW attempted to complete LOCET for this Pt, but she had one completed on June 8. It is good until July 8th. Faxed PASRR to the state to obtain the 142.    Maryse Ramey, PERICO  Neurocritical Care   [facility]  [id number]

## 2018-06-12 NOTE — PLAN OF CARE
Problem: Patient Care Overview  Goal: Plan of Care Review  Outcome: Ongoing (interventions implemented as appropriate)  Plan of care reviewed with patient and family at 1300. Patient verbalized understanding. All questions and concerns addressed today. Patient remains free from injury and falls. No acute events. Patient progressing towards goal. Will continue to monitor. See flowsheet for full assessment and vitals throughout shift.

## 2018-06-12 NOTE — PROGRESS NOTES
0826- CT ordered    0827- Spoke with CT in ED; Slot open now, on way with patient via 1 RN and 1 PCT, with cardene, portable monitor, oxygen and ambu bag in bed with patient.    0906- Patient transported back to room from CT. Changed patient's soiled sheets; Patient stable, NCC aware of CT completed; awaiting new orders; will continue to monitor.     0915- Patient tolerated swallowing pills, will continue to monitor.     0925- NCC rounding on patient    0945- Order has been placed for diet (diabetic diet, 1500 ronald); patient no longer NPO; NS at 75ml/hr has been discontinued; team has discussed goal to wean patient off of Cardene today.     0946- Ordered meal for patient from dietary.     0953- Speech is at bedside with patient evaluating cognitive function; Patient speaks fluent [place] Islander, does not require a .     1000- Cardene at 2.5ml/hr, patient tolerating; will continue to monitor.     1015- Message sent to pharmacy requesting Wellbutrin dose sent to unit.     1028- speech completed assessment; daughter left to go home and grab some clothes; Meds were given    1100- cardene off, Patient tolerating; Bupropion up from pharmacy; Meal arrived; pt , 4U of insulin given pre-meal.     1300- food arrived, bg 174, 6 u of insulin given     1330- Stroke at bedside performing assessment

## 2018-06-12 NOTE — HOSPITAL COURSE
6/11/18:  Evaluated by PT, OT, and SLP.  Found to have dysphagia.  SLP recommendation: NPO.  Bed mobility mod(I)-modA.  Sit to stand Arturo and transfers Arturo.  Ambulated 2-3 steps Arturo.  LBD totalA.  6/12/18:  Participated with SLP.  Speech, language, and cognitive skills WFL/at baseline.  SLP recommendation: dental soft diet and thin liquids.

## 2018-06-13 ENCOUNTER — TELEPHONE (OUTPATIENT)
Dept: HEPATOLOGY | Facility: HOSPITAL | Age: 73
End: 2018-06-13

## 2018-06-13 PROBLEM — N93.9 VAGINAL BLEEDING: Status: ACTIVE | Noted: 2018-06-13

## 2018-06-13 LAB
ALBUMIN SERPL BCP-MCNC: 2.3 G/DL
ALP SERPL-CCNC: 90 U/L
ALT SERPL W/O P-5'-P-CCNC: 10 U/L
ANION GAP SERPL CALC-SCNC: 9 MMOL/L
AST SERPL-CCNC: 13 U/L
BASOPHILS # BLD AUTO: 0.02 K/UL
BASOPHILS NFR BLD: 0.2 %
BILIRUB SERPL-MCNC: 0.8 MG/DL
BUN SERPL-MCNC: 26 MG/DL
CALCIUM SERPL-MCNC: 8.6 MG/DL
CHLORIDE SERPL-SCNC: 105 MMOL/L
CO2 SERPL-SCNC: 29 MMOL/L
CREAT SERPL-MCNC: 1.3 MG/DL
DIFFERENTIAL METHOD: ABNORMAL
EOSINOPHIL # BLD AUTO: 0.2 K/UL
EOSINOPHIL NFR BLD: 1.9 %
ERYTHROCYTE [DISTWIDTH] IN BLOOD BY AUTOMATED COUNT: 13.6 %
EST. GFR  (AFRICAN AMERICAN): 48 ML/MIN/1.73 M^2
EST. GFR  (NON AFRICAN AMERICAN): 41.7 ML/MIN/1.73 M^2
GLUCOSE SERPL-MCNC: 173 MG/DL
HCT VFR BLD AUTO: 36.3 %
HGB BLD-MCNC: 11.1 G/DL
IMM GRANULOCYTES # BLD AUTO: 0.23 K/UL
IMM GRANULOCYTES NFR BLD AUTO: 2.1 %
LYMPHOCYTES # BLD AUTO: 1.4 K/UL
LYMPHOCYTES NFR BLD: 12.9 %
MAGNESIUM SERPL-MCNC: 1.3 MG/DL
MCH RBC QN AUTO: 29.9 PG
MCHC RBC AUTO-ENTMCNC: 30.6 G/DL
MCV RBC AUTO: 98 FL
MONOCYTES # BLD AUTO: 0.6 K/UL
MONOCYTES NFR BLD: 5.6 %
NEUTROPHILS # BLD AUTO: 8.6 K/UL
NEUTROPHILS NFR BLD: 77.3 %
NRBC BLD-RTO: 0 /100 WBC
PHOSPHATE SERPL-MCNC: 3 MG/DL
PLATELET # BLD AUTO: 242 K/UL
PMV BLD AUTO: 11.2 FL
POCT GLUCOSE: 157 MG/DL (ref 70–110)
POCT GLUCOSE: 167 MG/DL (ref 70–110)
POCT GLUCOSE: 201 MG/DL (ref 70–110)
POTASSIUM SERPL-SCNC: 4.4 MMOL/L
PROT SERPL-MCNC: 5.6 G/DL
RBC # BLD AUTO: 3.71 M/UL
SODIUM SERPL-SCNC: 143 MMOL/L
WBC # BLD AUTO: 11.13 K/UL

## 2018-06-13 PROCEDURE — 25000242 PHARM REV CODE 250 ALT 637 W/ HCPCS: Performed by: PSYCHIATRY & NEUROLOGY

## 2018-06-13 PROCEDURE — 25000003 PHARM REV CODE 250: Performed by: PSYCHIATRY & NEUROLOGY

## 2018-06-13 PROCEDURE — 99233 SBSQ HOSP IP/OBS HIGH 50: CPT | Mod: ,,, | Performed by: NURSE PRACTITIONER

## 2018-06-13 PROCEDURE — 97116 GAIT TRAINING THERAPY: CPT

## 2018-06-13 PROCEDURE — 25000003 PHARM REV CODE 250: Performed by: STUDENT IN AN ORGANIZED HEALTH CARE EDUCATION/TRAINING PROGRAM

## 2018-06-13 PROCEDURE — 63600175 PHARM REV CODE 636 W HCPCS: Performed by: STUDENT IN AN ORGANIZED HEALTH CARE EDUCATION/TRAINING PROGRAM

## 2018-06-13 PROCEDURE — 20000000 HC ICU ROOM

## 2018-06-13 PROCEDURE — A4216 STERILE WATER/SALINE, 10 ML: HCPCS | Performed by: STUDENT IN AN ORGANIZED HEALTH CARE EDUCATION/TRAINING PROGRAM

## 2018-06-13 PROCEDURE — 25000003 PHARM REV CODE 250: Performed by: PHYSICIAN ASSISTANT

## 2018-06-13 PROCEDURE — 83735 ASSAY OF MAGNESIUM: CPT

## 2018-06-13 PROCEDURE — 97535 SELF CARE MNGMENT TRAINING: CPT

## 2018-06-13 PROCEDURE — 99232 SBSQ HOSP IP/OBS MODERATE 35: CPT | Mod: ,,, | Performed by: NURSE PRACTITIONER

## 2018-06-13 PROCEDURE — 97530 THERAPEUTIC ACTIVITIES: CPT

## 2018-06-13 PROCEDURE — 84100 ASSAY OF PHOSPHORUS: CPT

## 2018-06-13 PROCEDURE — 80053 COMPREHEN METABOLIC PANEL: CPT

## 2018-06-13 PROCEDURE — 94640 AIRWAY INHALATION TREATMENT: CPT

## 2018-06-13 PROCEDURE — 63600175 PHARM REV CODE 636 W HCPCS: Performed by: PHYSICIAN ASSISTANT

## 2018-06-13 PROCEDURE — 85025 COMPLETE CBC W/AUTO DIFF WBC: CPT

## 2018-06-13 PROCEDURE — 94761 N-INVAS EAR/PLS OXIMETRY MLT: CPT

## 2018-06-13 PROCEDURE — 99233 SBSQ HOSP IP/OBS HIGH 50: CPT | Mod: ,,, | Performed by: PSYCHIATRY & NEUROLOGY

## 2018-06-13 PROCEDURE — G8980 MOBILITY D/C STATUS: HCPCS | Mod: CK

## 2018-06-13 PROCEDURE — 27000221 HC OXYGEN, UP TO 24 HOURS

## 2018-06-13 RX ORDER — FUROSEMIDE 20 MG/1
20 TABLET ORAL DAILY
Status: DISCONTINUED | OUTPATIENT
Start: 2018-06-14 | End: 2018-06-14 | Stop reason: HOSPADM

## 2018-06-13 RX ORDER — FLUTICASONE PROPIONATE AND SALMETEROL 250; 50 UG/1; UG/1
1 POWDER RESPIRATORY (INHALATION) 2 TIMES DAILY
Status: DISCONTINUED | OUTPATIENT
Start: 2018-06-13 | End: 2018-06-14 | Stop reason: HOSPADM

## 2018-06-13 RX ADMIN — HYDRALAZINE HYDROCHLORIDE 50 MG: 50 TABLET ORAL at 10:06

## 2018-06-13 RX ADMIN — BUPROPION HYDROCHLORIDE 75 MG: 75 TABLET, FILM COATED ORAL at 08:06

## 2018-06-13 RX ADMIN — Medication 3 ML: at 02:06

## 2018-06-13 RX ADMIN — ATORVASTATIN CALCIUM 40 MG: 20 TABLET, FILM COATED ORAL at 08:06

## 2018-06-13 RX ADMIN — Medication 3 ML: at 05:06

## 2018-06-13 RX ADMIN — INSULIN ASPART 4 UNITS: 100 INJECTION, SOLUTION INTRAVENOUS; SUBCUTANEOUS at 08:06

## 2018-06-13 RX ADMIN — Medication 3 ML: at 10:06

## 2018-06-13 RX ADMIN — HEPARIN SODIUM 5000 UNITS: 5000 INJECTION, SOLUTION INTRAVENOUS; SUBCUTANEOUS at 08:06

## 2018-06-13 RX ADMIN — HYDRALAZINE HYDROCHLORIDE 50 MG: 50 TABLET ORAL at 05:06

## 2018-06-13 RX ADMIN — BUPROPION HYDROCHLORIDE 75 MG: 75 TABLET, FILM COATED ORAL at 03:06

## 2018-06-13 RX ADMIN — CARVEDILOL 6.25 MG: 6.25 TABLET, FILM COATED ORAL at 08:06

## 2018-06-13 RX ADMIN — BENZONATATE 100 MG: 100 CAPSULE ORAL at 04:06

## 2018-06-13 RX ADMIN — FLUTICASONE PROPIONATE AND SALMETEROL 1 PUFF: 50; 250 POWDER RESPIRATORY (INHALATION) at 09:06

## 2018-06-13 RX ADMIN — INSULIN ASPART 4 UNITS: 100 INJECTION, SOLUTION INTRAVENOUS; SUBCUTANEOUS at 06:06

## 2018-06-13 RX ADMIN — HYDRALAZINE HYDROCHLORIDE 50 MG: 50 TABLET ORAL at 02:06

## 2018-06-13 RX ADMIN — STANDARDIZED SENNA CONCENTRATE AND DOCUSATE SODIUM 1 TABLET: 8.6; 5 TABLET, FILM COATED ORAL at 08:06

## 2018-06-13 RX ADMIN — FAMOTIDINE 20 MG: 20 TABLET ORAL at 08:06

## 2018-06-13 RX ADMIN — INSULIN ASPART 2 UNITS: 100 INJECTION, SOLUTION INTRAVENOUS; SUBCUTANEOUS at 08:06

## 2018-06-13 RX ADMIN — ACETAMINOPHEN 650 MG: 325 TABLET ORAL at 08:06

## 2018-06-13 RX ADMIN — FLUTICASONE PROPIONATE 100 MCG: 50 SPRAY, METERED NASAL at 08:06

## 2018-06-13 RX ADMIN — BENZONATATE 100 MG: 100 CAPSULE ORAL at 08:06

## 2018-06-13 RX ADMIN — FUROSEMIDE 20 MG: 10 INJECTION, SOLUTION INTRAMUSCULAR; INTRAVENOUS at 08:06

## 2018-06-13 RX ADMIN — IPRATROPIUM BROMIDE AND ALBUTEROL SULFATE 3 ML: .5; 3 SOLUTION RESPIRATORY (INHALATION) at 08:06

## 2018-06-13 RX ADMIN — ASPIRIN 81 MG CHEWABLE TABLET 81 MG: 81 TABLET CHEWABLE at 08:06

## 2018-06-13 RX ADMIN — INSULIN ASPART 4 UNITS: 100 INJECTION, SOLUTION INTRAVENOUS; SUBCUTANEOUS at 01:06

## 2018-06-13 RX ADMIN — IPRATROPIUM BROMIDE AND ALBUTEROL SULFATE 3 ML: .5; 3 SOLUTION RESPIRATORY (INHALATION) at 01:06

## 2018-06-13 NOTE — ASSESSMENT & PLAN NOTE
--SSI while npo  --a1c 10  -starting scheduled insulin TID WM now that diet starting.   -Would question home compliance with diet or insulin as requirements in Hospital minimal

## 2018-06-13 NOTE — ASSESSMENT & PLAN NOTE
72 y.o. female with significant past medical history of COPD, DM II, HTN, CKD III, and hx of MI presented to hospital as a transfer from [facility] for evaluation of R MCA stroke symptoms. Received tPA. No LVO, no intervention.    CTH with evolving hypoattenuation in the Left inferior frontal lobe which likely represents age-indeterminate infarction. Pt with PM, unable to obtain MRI to further characterize. Etiology likely small vessel disease (A1c 10.5%). Weakness much improved on exam    Pt approved for diet per SLP. Cardene off since 11 am on 6/12/18      Antithrombotics for secondary stroke prevention: Antiplatelets: Aspirin: 325 mg daily  Statins for secondary stroke prevention and hyperlipidemia, if present:   Statins: Atorvastatin- 40 mg daily  Aggressive risk factor modification: HTN, DM, HLD, Diet, Exercise, Obesity  Rehab efforts: PT/OT/SLP to evaluate and treat   Dispo: SNF vs home with daughter (CNA) and HH  Diagnostics ordered/pending: None   VTE prophylaxis: Heparin 5000 units SQ every 8 hours  BP parameters: Infarct: Post tPA, SBP <180

## 2018-06-13 NOTE — SUBJECTIVE & OBJECTIVE
Neurologic Chief Complaint: AMS    Subjective:     Interval History: Patient is seen for follow-up neurological assessment and treatment recommendations: off cardene since 11 am yesterday. Planning for step down and snf placement   Daughter at bedside    HPI, Past Medical, Family, and Social History remains the same as documented in the initial encounter.     Review of Systems   Constitutional: Negative for chills and fever.   Respiratory: Positive for cough. Negative for stridor.    Cardiovascular: Positive for chest pain (when talking on the phone).   Genitourinary: Positive for vaginal bleeding.   Neurological: Positive for facial asymmetry. Negative for speech difficulty, weakness and numbness.     Scheduled Meds:   albuterol-ipratropium  3 mL Nebulization Q6H    aspirin  81 mg Oral Daily    atorvastatin  40 mg Per NG tube Daily    buPROPion  75 mg Oral TID    carvedilol  6.25 mg Oral BID    famotidine  20 mg Oral BID    fluticasone  2 spray Each Nare Daily    furosemide  20 mg Intravenous Daily    heparin (porcine)  5,000 Units Subcutaneous Q12H    hydrALAZINE  50 mg Per NG tube Q8H    insulin aspart U-100  4 Units Subcutaneous TIDWM    senna-docusate 8.6-50 mg  1 tablet Per NG tube Daily    sodium chloride 0.9%  3 mL Intravenous Q8H     Continuous Infusions:    PRN Meds:acetaminophen, benzonatate, dextrose 50%, dextrose 50%, glucagon (human recombinant), glucose, glucose, insulin aspart U-100, labetalol    Objective:     Vital Signs (Most Recent):  Temp: 98.5 °F (36.9 °C) (06/13/18 0705)  Pulse: 80 (06/13/18 0900)  Resp: (!) 25 (06/13/18 0900)  BP: 114/65 (06/13/18 0900)  SpO2: 95 % (06/13/18 0900)  BP Location: Left arm    Vital Signs Range (Last 24H):  Temp:  [97.8 °F (36.6 °C)-99.1 °F (37.3 °C)]   Pulse:  [72-85]   Resp:  [12-49]   BP: (114-172)/(63-92)   SpO2:  [93 %-98 %]   BP Location: Left arm    Physical Exam   Constitutional: She is oriented to person, place, and time. She appears  well-developed and well-nourished. No distress.   HENT:   Head: Normocephalic and atraumatic.   Eyes: EOM are normal.   Cardiovascular: Normal rate.    Pulmonary/Chest: Effort normal. No respiratory distress.   Neurological: She is alert and oriented to person, place, and time.   Skin: Skin is warm and dry.   Psychiatric: She has a normal mood and affect. Her behavior is normal.   per nurse, scant bleeding from vaginal area while changing     Neurological Exam:   LOC: alert  Attention Span: Good   Language: No aphasia  Articulation: No obvious dysarthria noted  Visual Fields: Full  EOM (CN III, IV, VI): Full/intact  Facial Movement (CN VII): Lower facial weakness on the Right  Motor: Arm left  Normal 5/5  Leg left  Paresis: 4/5  Arm right  Normal 5/5  Leg right Paresis: 4/5  Tone: Normal tone throughout    Laboratory:  CMP:     Recent Labs  Lab 06/13/18  0251   CALCIUM 8.6*   ALBUMIN 2.3*   PROT 5.6*      K 4.4   CO2 29      BUN 26*   CREATININE 1.3   ALKPHOS 90   ALT 10   AST 13   BILITOT 0.8     CBC:     Recent Labs  Lab 06/13/18  0251   WBC 11.13   RBC 3.71*   HGB 11.1*   HCT 36.3*      MCV 98   MCH 29.9   MCHC 30.6*     Lipid Panel:     Recent Labs  Lab 06/10/18  2006   CHOL 148   LDLCALC 83.4   HDL 47   TRIG 88     Coagulation:   Recent Labs  Lab 06/07/18  0733  06/10/18  2006   INR  --   < > 1.1   APTT 28.1  --   --    < > = values in this interval not displayed.  Platelet Aggregation Study: No results for input(s): PLTAGG, PLTAGINTERP, PLTAGREGLACO, ADPPLTAGGREG in the last 168 hours.  Hgb A1C:     Recent Labs  Lab 06/10/18  2006   HGBA1C 10.5*     TSH:     Recent Labs  Lab 06/10/18  2006   TSH 1.535       Diagnostic Results     Brain Imaging     CT Head 6/12/18  Evolving hypoattenuation left inferior frontal lobe somewhat more apparent on today's examination cannot exclude area vaulting infarction overall age indeterminate somewhat limited by motion.  There is no evidence for acute  intracranial hemorrhage or is hydrocephalus.    CT Head 6/10/18 -- OSH  Inferior left frontal lobe more conspicuous area of hypoattenuation without significant volume loss, which may be related to artifact versus acute/subacute infarct. Further evaluation/follow-up as warranted.  Age-related mild senescent changes as above.  Right sphenoid sinus disease.      Vessel Imaging     CTA Multiphase 6/10/18  Redemonstration of a left inferior frontal area of hypoattenuation similar to the prior exam without evidence for significant progression.  This may represent a acute or remote lacunar infarction.  Further evaluation with MRI if clinically warranted.  Subtle decrease in prominence of the right MCA vascularity on the 1st phase of imaging in comparison the left with fill-in on the 2nd phase of imaging.  No occlusion is seen.  Right upper lobe 5 mm solid pulmonary nodule.  For a solid nodule <6 mm, Fleischner Society 2017 guidelines recommend no routine follow up for a low risk patient, or follow-up with non-contrast chest CT at 12 months in a high risk patient.  Mid to upper esophageal distension with intraluminal gas and fluid contents, which may be related to reflux versus soft geode dysmotility, and could place patient at risk for aspiration.  Bilateral upper lobe mild nonspecific peribronchial thickening with centrilobular patchy ground-glass attenuation which can be seen with small vessel disease including pulmonary edema, small airways disease with early changes of bronchopneumonia, or aspiration.  Further evaluation/follow-up as warranted.      Cardiac Imaging     Echo w/ CFD 6/11/18  CONCLUSIONS  Normal LA     1 - Normal left ventricular systolic function (EF 55-60%).     2 - Normal left ventricular diastolic function.     3 - Normal right ventricular systolic function.

## 2018-06-13 NOTE — ASSESSMENT & PLAN NOTE
[address] [address] 
[phone] Patient: John Campos MRN: [id number] :1932 My Medications TAKE these medications as instructed Instructions Each Dose to Equal  
 Morning Noon Evening Bedtime  
 amLODIPine 5 mg tablet Commonly known as:  Frida Mcclelland Your last dose was: Your next dose is: Take 0.5 Tabs by mouth daily for 30 days. 2.5 mg  
    
   
   
   
  
 aspirin 81 mg tablet Your last dose was: Your next dose is:    
   
   
 take by mouth. atorvastatin 40 mg tablet Commonly known as:  LIPITOR Your last dose was: Your next dose is: Take  by mouth daily. carvedilol 12.5 mg tablet Commonly known as:  Juan Becerra Your last dose was: Your next dose is: Take 12.5 mg by mouth two (2) times daily (with meals). 12.5 mg  
    
   
   
   
  
 cholecalciferol 1,000 unit Cap Commonly known as:  VITAMIN D3 Your last dose was: Your next dose is: Take  by mouth two (2) times a day. cyanocobalamin 1,000 mcg tablet Your last dose was: Your next dose is: Take 2,500 mcg by mouth daily. 2500 mcg  
    
   
   
   
  
 levETIRAcetam 500 mg tablet Commonly known as:  KEPPRA Your last dose was: Your next dose is: Take 1 Tab by mouth two (2) times a day for 30 days. 500 mg  
    
   
   
   
  
 lisinopril 40 mg tablet Commonly known as:  Sammie Coppola Your last dose was: Your next dose is: Take 40 mg by mouth daily. 40 mg  
    
   
   
   
  
 magnesium oxide 400 mg tablet Commonly known as:  MAG-OX Your last dose was: Your next dose is: Take 1 Tab by mouth two (2) times a day for 14 days.   
 400 mg  
    
   
 On Lipitor    metFORMIN 500 mg tablet Commonly known as:  GLUCOPHAGE Your last dose was: Your next dose is: Take 1,000 mg by mouth two (2) times daily (with meals). 1000 mg  
    
   
   
   
  
 nitrofurantoin (macrocrystal-monohydrate) 100 mg capsule Commonly known as:  MACROBID Your last dose was: Your next dose is:    
   
   
      
   
   
   
  
 nystatin powder Commonly known as:  MYCOSTATIN Your last dose was: Your next dose is:    
   
   
 Apply 1 g to affected area two (2) times a day for 14 days. 1 g  
    
   
   
   
  
 omeprazole 40 mg capsule Commonly known as:  PRILOSEC Your last dose was: Your next dose is: Take 40 mg by mouth daily. 40 mg PROBIOTIC 4X 10-15 mg Tbec Generic drug:  B.infantis-B.ani-B.long-B.bifi Your last dose was: Your next dose is: Take  by mouth daily. raNITIdine 150 mg tablet Commonly known as:  ZANTAC Your last dose was: Your next dose is: Take 150 mg by mouth daily. 150 mg  
    
   
   
   
  
 spironolactone 25 mg tablet Commonly known as:  ALDACTONE Your last dose was: Your next dose is: Take 1 Tab by mouth daily. 25 mg  
    
   
   
   
  
 TYLENOL EXTRA STRENGTH 500 mg tablet Generic drug:  acetaminophen Your last dose was: Your next dose is: Take  by mouth every six (6) hours as needed for Pain. URO--10-40.8-36 mg Cap capsule Generic drug:  Mth-Me Blue-Sod Phos-PhSal-Hyo Your last dose was: Your next dose is: ZINC PO Your last dose was: Your next dose is: Take  by mouth. Where to Get Your Medications These medications were sent to [address] [place] [address] [facility], [facility] [place], [place] [id number] Phone:  [phone]  
  amLODIPine 5 mg tablet  
 levETIRAcetam 500 mg tablet  
 magnesium oxide 400 mg tablet  
 nystatin powder

## 2018-06-13 NOTE — PLAN OF CARE
Problem: Occupational Therapy Goal  Goal: Occupational Therapy Goal  Goals to be met by: 6/25/18     Patient will increase functional independence with ADLs by performing:    UE Dressing with Williams.  LE Dressing with Modified Williams.  Grooming while standing at sink with Modified Williams.  Toileting from toilet with Modified Williams for hygiene and clothing management.   Bathing from  shower chair/bench with Modified Williams.  Toilet transfer to toilet with Modified Williams.  Increased functional strength to WFL for B UE.  Upper extremity exercise program x15 reps per handout, with independence.  Pt will be Ox4.  Pt will state what to do in an emergency c 100% accuracy.       Outcome: Ongoing (interventions implemented as appropriate)  Continue with POC.   Dania mccann OT  6/13/2018

## 2018-06-13 NOTE — ASSESSMENT & PLAN NOTE
"Per H&P - " 1 month of vaginal bleeding. She has a gynecologist appt on June 20. She reports that she previously had uterine cancer of some sort. "   H&H remains stable   "

## 2018-06-13 NOTE — ASSESSMENT & PLAN NOTE
"  Per H&P - " 1 month of vaginal bleeding. She has a gynecologist appt on June 20. She reports that she previously had uterine cancer of some sort. "   H&H remains stable      "

## 2018-06-13 NOTE — ASSESSMENT & PLAN NOTE
-  CTH concerning for L frontal infarct  -  S/p tPA  -  CTA multiphase without LVO--> intervention not indicated  -  Unable to obtain MRI 2/2 pacemaker  -  Repeat CTH concerning for L inferior frontal lobe infarct  -  Etiology likely vessel disease  See hospital course for functional, cognitive/speech/language, and nutrition/swallow status.      Recommendations  -  Encourage mobility, OOB in chair, and early ambulation as appropriate   -  PT/OT evaluate and treat  -  SLP speech and cognitive evaluate and treat  -  Monitor mood and sleep disturbances  -  Establish consistent sleep-wake cycle  -  Monitor for bowel and bladder dysfunction  -  Monitor for shoulder pain and subluxation  -  Monitor for spasticity  -  Monitor for and prevent skin breakdown and pressure ulcers  · Early mobility, repositioning/weight shifting every 20-30 minutes when sitting, turn patient every 2 hours, proper mattress/overlay and chair cushioning, pressure relief/heel protector boots  -  DVT prophylaxis:  ROBBIN, SCD

## 2018-06-13 NOTE — PROGRESS NOTES
[facility]  Physical Medicine & Rehab  Progress Note    Patient Name: Franklin Christian  MRN: [id number]  Admission Date: 6/10/2018  Length of Stay: 3 days  Attending Physician: Augusto Xie MD    Subjective:     Principal Problem:Embolic stroke involving left middle cerebral artery    Hospital Course:   6/11/18:  Evaluated by PT, OT, and SLP.  Found to have dysphagia.  SLP recommendation: NPO.  Bed mobility mod(I)-modA.  Sit to stand Arturo and transfers Arturo.  Ambulated 2-3 steps Arturo.  LBD totalA.  6/12/18:  Participated with SLP.  Speech, language, and cognitive skills WFL/at baseline.  SLP recommendation: dental soft diet and thin liquids.    Interval History 6/13/2018:  Patient is seen for follow-up rehab evaluation and recommendations: No acute events over night.  Progressing with therapy.    HPI, Past Medical, Family, and Social History remains the same as documented in the initial encounter.    Scheduled Medications:    albuterol-ipratropium  3 mL Nebulization Q6H    aspirin  81 mg Oral Daily    atorvastatin  40 mg Per NG tube Daily    buPROPion  75 mg Oral TID    carvedilol  6.25 mg Oral BID    famotidine  20 mg Oral BID    fluticasone  2 spray Each Nare Daily    [START ON 6/14/2018] furosemide  20 mg Oral Daily    heparin (porcine)  5,000 Units Subcutaneous Q12H    hydrALAZINE  50 mg Per NG tube Q8H    insulin aspart U-100  4 Units Subcutaneous TIDWM    senna-docusate 8.6-50 mg  1 tablet Per NG tube Daily    sodium chloride 0.9%  3 mL Intravenous Q8H     PRN Medications: acetaminophen, benzonatate, dextrose 50%, dextrose 50%, glucagon (human recombinant), glucose, glucose, insulin aspart U-100, labetalol    Review of Systems   Constitutional: Negative for chills, fatigue and fever.   HENT: Negative for drooling, hearing loss, trouble swallowing and voice change.    Eyes: Negative for pain and visual disturbance.   Respiratory: Negative for cough, shortness of breath and  wheezing.    Cardiovascular: Negative for chest pain and palpitations.   Gastrointestinal: Negative for abdominal distention, nausea and vomiting.   Genitourinary: Negative for difficulty urinating and flank pain.   Musculoskeletal: Positive for gait problem. Negative for arthralgias, back pain, myalgias and neck pain.   Skin: Negative for rash and wound.   Neurological: Positive for weakness and numbness. Negative for dizziness and headaches.   Psychiatric/Behavioral: Negative for agitation and hallucinations. The patient is not nervous/anxious.      Objective:     Vital Signs (Most Recent):  Temp: 98.5 °F (36.9 °C) (18 1105)  Pulse: 71 (18 1400)  Resp: 17 (18 1400)  BP: (!) 163/71 (18 1400)  SpO2: 98 % (18 1400)    Vital Signs (24h Range):  Temp:  [97.8 °F (36.6 °C)-99 °F (37.2 °C)] 98.5 °F (36.9 °C)  Pulse:  [69-85] 71  Resp:  [12-49] 17  SpO2:  [93 %-100 %] 98 %  BP: (105-172)/(56-92) 163/71     Physical Exam   Constitutional: She is oriented to person, place, and time. She appears well-developed and well-nourished. No distress.   HENT:   Head: Normocephalic and atraumatic.   Right Ear: External ear normal.   Left Ear: External ear normal.   Nose: Nose normal.   Eyes: Right eye exhibits no discharge. Left eye exhibits no discharge. No scleral icterus.   Neck: Normal range of motion.   Cardiovascular: Normal rate, regular rhythm and intact distal pulses.    Pulmonary/Chest: Effort normal. No respiratory distress. She has no wheezes.   Abdominal: Soft. She exhibits no distension. There is no tenderness.   Musculoskeletal: Normal range of motion. She exhibits no edema or tenderness.   BLE edema   Neurological: She is alert and oriented to person, place, and time. She exhibits normal muscle tone.   -  Mental Status:  AAOx3.  Follows commands.  Answers correct age and .  -  Speech and language:  no aphasia or dysarthria.      -  Facial movement (CN VII): symmetrical.   -  Motor:  RUE:  4/5.  LUE: 5/5.  RLE: 4/5.  LLE: 5/5.  -  Sensory:  Intact to light touch and pin prick.   Skin: Skin is warm and dry. No rash noted.   Psychiatric: She has a normal mood and affect. Her behavior is normal. Thought content normal.   Vitals reviewed.    Diagnostic Results:   Labs: Reviewed  X-Ray: Reviewed  CT: Reviewed  CTA: Reviewed    Assessment/Plan:      * Embolic stroke involving left middle cerebral artery    -  CTH concerning for L frontal infarct  -  S/p tPA  -  CTA multiphase without LVO--> intervention not indicated  -  Unable to obtain MRI 2/2 pacemaker  -  Repeat CTH concerning for L inferior frontal lobe infarct  -  Etiology likely vessel disease  See hospital course for functional, cognitive/speech/language, and nutrition/swallow status.      Recommendations  -  Encourage mobility, OOB in chair, and early ambulation as appropriate   -  PT/OT evaluate and treat  -  SLP speech and cognitive evaluate and treat  -  Monitor mood and sleep disturbances  -  Establish consistent sleep-wake cycle  -  Monitor for bowel and bladder dysfunction  -  Monitor for shoulder pain and subluxation  -  Monitor for spasticity  -  Monitor for and prevent skin breakdown and pressure ulcers  · Early mobility, repositioning/weight shifting every 20-30 minutes when sitting, turn patient every 2 hours, proper mattress/overlay and chair cushioning, pressure relief/heel protector boots  -  DVT prophylaxis:  ROBBIN, SCD        Esophageal dysphagia    -  SLP following--> recommending NPO        Pulmonary nodule    -  CTA showed RUL 5 mm solid pulmonary nodule        Cytotoxic cerebral edema    -  2/2 stroke        Progressing with therapy and with limited rehab goals.  Agree with therapy recommendation for home health therapy.  Will sign off.  Please call with questions/concerns or re-consult if situation changes.    NOBLE Clinton  Department of Physical Medicine & Rehab   [facility]

## 2018-06-13 NOTE — PLAN OF CARE
WILBERTO met with Pt and Pt family at bedside. Discussed therapy recs have changed to  and provided list for [facility] [place]. Pt will go to [place] with the daughter. Pt daughter has no preference save that the people that come speak Italian if possible. Her address in [place] is: [address] [place] [address], [address], [place] [address]. Pt is also interested in a hospital bed if possible and other DME. Reported this can be checked on upon dc (unsure if insurance will cover the bed).    Maryse Ramey, PERICO  Neurocritical Care   [facility]  [phone]

## 2018-06-13 NOTE — PLAN OF CARE
WILBERTO sent referrals to [facility], and [facility] via [facility].    Maryse Ramey LMSW  Neurocritical Care   [facility]  [phone]

## 2018-06-13 NOTE — ASSESSMENT & PLAN NOTE
-Admitted to OSH for asthma exacerbation.  Remains w/intermittent, productive cough  - continue duonebs   -Managed by primary team.

## 2018-06-13 NOTE — PLAN OF CARE
Problem: Patient Care Overview  Goal: Plan of Care Review  Outcome: Ongoing (interventions implemented as appropriate)  POC reviewed with pt and family at 1400. Pt verbalized understanding. Questions and concerns addressed. No acute events today. Pt progressing toward goals. Patient awaiting bed on step down unit. Will continue to monitor. See flowsheets for full assessment and VS info.

## 2018-06-13 NOTE — PLAN OF CARE
Problem: Physical Therapy Goal  Goal: Physical Therapy Goal    Goals to be met by 6/22/2018    1. Pt will perform supine to sit from both sides of the bed with SBA.  2. Pt will perform sit to supine with SBA.  3. Pt will stand x 5 minutes with CGA and no LOB while performing dynamic UE tasks to prepare for functional tasks in standing  4. Pt will perform sit to stand transfers with CGA.   MET 6/13/2018  5. Pt will perform bed <> chair transfers with SBA.   MET 6/13/2018  6. Pt will perform gait x 50 feet with SBA using appropriate AD.   MET 6/13/2018  7. Pt will ascend and descend 2 flights of stairs with hand rail in order to access her dtr's home (desired d/c destination)       Discharge Recommendations: Home with HH PT    Pt safe to transfer and amb short distances with RN/PCT and family: Use RW/no AD with SBA.    Goals remain appropriate.     Esther Dennison, PTA.   [phone]   6/13/2018

## 2018-06-13 NOTE — PROGRESS NOTES
"[facility]  Vascular Neurology  Comprehensive Stroke Center  Progress Note    Assessment/Plan:     * Embolic stroke involving left middle cerebral artery    72 y.o. female with significant past medical history of COPD, DM II, HTN, CKD III, and hx of MI presented to hospital as a transfer from [facility] for evaluation of R MCA stroke symptoms. Received tPA. No LVO, no intervention.    CTH with evolving hypoattenuation in the Left inferior frontal lobe which likely represents age-indeterminate infarction. Pt with PM, unable to obtain MRI to further characterize. Etiology likely small vessel disease (A1c 10.5%). Weakness much improved on exam    Pt approved for diet per SLP. Cardene off since 11 am on 6/12/18      Antithrombotics for secondary stroke prevention: Antiplatelets: Aspirin: 325 mg daily recommended  Statins for secondary stroke prevention and hyperlipidemia, if present:   Statins: Atorvastatin- 40 mg daily  Aggressive risk factor modification: HTN, DM, HLD, Diet, Exercise, Obesity  Rehab efforts: PT/OT/SLP to evaluate and treat   Dispo: SNF vs home with daughter (CNA) and   Diagnostics ordered/pending: None   VTE prophylaxis: Heparin 5000 units SQ every 8 hours  BP parameters: Infarct: Post tPA, SBP <180        Vaginal bleeding    Per H&P - " 1 month of vaginal bleeding. She has a gynecologist appt on June 20. She reports that she previously had uterine cancer of some sort. "   H&H remains stable         Mixed hyperlipidemia    Stroke risk factor  LDL 83  Atorvastatin 40        Esophageal dysphagia    Mid to upper esophageal distension seen on CTA  Could place patient at risk for aspiration  Approved for dental soft, thin liquid diet  SLP following        Pulmonary nodule    Pt with hx COPD  RUL 5 mm solid pulmonary nodule  For a solid nodule <6 mm, Fleischner Society 2017 guidelines recommend  follow-up with non-contrast chest CT at 12 months in a high risk patient      "   Cytotoxic cerebral edema    Upon review of brain imaging, small area of cytotoxic cerebral edema identified in the territory of the Left anterior cerebral artery. There is not associated mass effect.   We will continue to monitor the patients clinical exam for any worsening of symptoms which may indicate expansion of the stroke or the area of edema resulting in such clinical change.   Pattern is suggestive of a small vessel etiology.        Received tissue plasminogen activator (t-PA) less than 24 hours prior to arrival    -Admitted to [facility] for close monitoring -- Completed        Type 2 diabetes mellitus, with long-term current use of insulin    -Stroke risk factor   A1c 10.5%  Needs tight glucose control  Recommend Endocrinology consult prior to d/c for regimen planning        Essential hypertension    -Stroke risk factor  SBP<180 s/p tPA  Now off Cardene        COPD exacerbation    -Admitted to OSH for asthma exacerbation.  Remains w/intermittent, productive cough  - continue duonebs   -Managed by primary team.             6/11/18: Pt desatted to 70s% during swallow/SLP evaluation; remains NPO. NGT to be placed so pt can get today's ASA dose. On Cardene.  6/12: Pt approved for diet; reporting improvement in previous R hand numbness. Cardene off. Pt will step down if SBP remains stable this afternoon.  6/13/18 - off cardene since 11 am yesterday. Planning for step down and snf placement     STROKE DOCUMENTATION   Acute Stroke Times   Last Known Normal Date: 06/10/18  Last Known Normal Time: 1215  Symptom Onset Date: 06/10/18  Symptom Onset Time: 1215  Stroke Team Called Date: 06/10/18  Stroke Team Arrival Date: 06/10/18  CT Interpretation Time: 1325  Decision to Treat Time for Alteplase: 1345    NIH Scale:  1a. Level Of Consciousness: 0-->Alert: keenly responsive  1b. LOC Questions: 0-->Answers both questions correctly  1c. LOC Commands: 0-->Performs both tasks correctly  2. Best Gaze: 0-->Normal  3. Visual:  0-->No visual loss  4. Facial Palsy: 1-->Minor paralysis (flattened nasolabial fold, asymmetry on smiling)  5a. Motor Arm, Left: 0-->No drift: limb holds 90 (or 45) degrees for full 10 secs  5b. Motor Arm, Right: 0-->No drift: limb holds 90 (or 45) degrees for full 10 secs  6a. Motor Leg, Left: 0-->No drift: leg holds 30 degree position for full 5 secs  6b. Motor Leg, Right: 0-->No drift: leg holds 30 degree position for full 5 secs  7. Limb Ataxia: 0-->Absent  8. Sensory: 0-->Normal: no sensory loss  9. Best Language: 0-->No aphasia: normal  10. Dysarthria: 0-->Normal  11. Extinction and Inattention (formerly Neglect): 0-->No abnormality  Total (NIH Stroke Scale): 1       Modified Nazanin Score: 1  [place] Coma Scale:    ABCD2 Score:    [id number] Score:   HAS -BLED Score:   ICH Score:   Hunt & Diehl Classification:      Hemorrhagic change of an Ischemic Stroke: Does this patient have an ischemic stroke with hemorrhagic changes? No     Neurologic Chief Complaint: AMS    Subjective:     Interval History: Patient is seen for follow-up neurological assessment and treatment recommendations: off cardene since 11 am yesterday. Planning for step down and snf placement   Daughter at bedside    HPI, Past Medical, Family, and Social History remains the same as documented in the initial encounter.     Review of Systems   Constitutional: Negative for chills and fever.   Respiratory: Positive for cough. Negative for stridor.    Cardiovascular: Positive for chest pain (when talking on the phone).   Genitourinary: Positive for vaginal bleeding.   Neurological: Positive for facial asymmetry. Negative for speech difficulty, weakness and numbness.     Scheduled Meds:   albuterol-ipratropium  3 mL Nebulization Q6H    aspirin  81 mg Oral Daily    atorvastatin  40 mg Per NG tube Daily    buPROPion  75 mg Oral TID    carvedilol  6.25 mg Oral BID    famotidine  20 mg Oral BID    fluticasone  2 spray Each Nare Daily    furosemide   20 mg Intravenous Daily    heparin (porcine)  5,000 Units Subcutaneous Q12H    hydrALAZINE  50 mg Per NG tube Q8H    insulin aspart U-100  4 Units Subcutaneous TIDWM    senna-docusate 8.6-50 mg  1 tablet Per NG tube Daily    sodium chloride 0.9%  3 mL Intravenous Q8H     Continuous Infusions:    PRN Meds:acetaminophen, benzonatate, dextrose 50%, dextrose 50%, glucagon (human recombinant), glucose, glucose, insulin aspart U-100, labetalol    Objective:     Vital Signs (Most Recent):  Temp: 98.5 °F (36.9 °C) (06/13/18 0705)  Pulse: 80 (06/13/18 0900)  Resp: (!) 25 (06/13/18 0900)  BP: 114/65 (06/13/18 0900)  SpO2: 95 % (06/13/18 0900)  BP Location: Left arm    Vital Signs Range (Last 24H):  Temp:  [97.8 °F (36.6 °C)-99.1 °F (37.3 °C)]   Pulse:  [72-85]   Resp:  [12-49]   BP: (114-172)/(63-92)   SpO2:  [93 %-98 %]   BP Location: Left arm    Physical Exam   Constitutional: She is oriented to person, place, and time. She appears well-developed and well-nourished. No distress.   HENT:   Head: Normocephalic and atraumatic.   Eyes: EOM are normal.   Cardiovascular: Normal rate.    Pulmonary/Chest: Effort normal. No respiratory distress.   Neurological: She is alert and oriented to person, place, and time.   Skin: Skin is warm and dry.   Psychiatric: She has a normal mood and affect. Her behavior is normal.   per nurse, scant bleeding from vaginal area while changing     Neurological Exam:   LOC: alert  Attention Span: Good   Language: No aphasia  Articulation: No obvious dysarthria noted  Visual Fields: Full  EOM (CN III, IV, VI): Full/intact  Facial Movement (CN VII): Lower facial weakness on the Right  Motor: Arm left  Normal 5/5  Leg left  Paresis: 4/5  Arm right  Normal 5/5  Leg right Paresis: 4/5  Tone: Normal tone throughout    Laboratory:  CMP:     Recent Labs  Lab 06/13/18  0251   CALCIUM 8.6*   ALBUMIN 2.3*   PROT 5.6*      K 4.4   CO2 29      BUN 26*   CREATININE 1.3   ALKPHOS 90   ALT 10   AST 13    BILITOT 0.8     CBC:     Recent Labs  Lab 06/13/18  0251   WBC 11.13   RBC 3.71*   HGB 11.1*   HCT 36.3*      MCV 98   MCH 29.9   MCHC 30.6*     Lipid Panel:     Recent Labs  Lab 06/10/18  2006   CHOL 148   LDLCALC 83.4   HDL 47   TRIG 88     Coagulation:   Recent Labs  Lab 06/07/18  0733  06/10/18  2006   INR  --   < > 1.1   APTT 28.1  --   --    < > = values in this interval not displayed.  Platelet Aggregation Study: No results for input(s): PLTAGG, PLTAGINTERP, PLTAGREGLACO, ADPPLTAGGREG in the last 168 hours.  Hgb A1C:     Recent Labs  Lab 06/10/18  2006   HGBA1C 10.5*     TSH:     Recent Labs  Lab 06/10/18  2006   TSH 1.535       Diagnostic Results     Brain Imaging     CT Head 6/12/18  Evolving hypoattenuation left inferior frontal lobe somewhat more apparent on today's examination cannot exclude area vaulting infarction overall age indeterminate somewhat limited by motion.  There is no evidence for acute intracranial hemorrhage or is hydrocephalus.    CT Head 6/10/18 -- OSH  Inferior left frontal lobe more conspicuous area of hypoattenuation without significant volume loss, which may be related to artifact versus acute/subacute infarct. Further evaluation/follow-up as warranted.  Age-related mild senescent changes as above.  Right sphenoid sinus disease.      Vessel Imaging     CTA Multiphase 6/10/18  Redemonstration of a left inferior frontal area of hypoattenuation similar to the prior exam without evidence for significant progression.  This may represent a acute or remote lacunar infarction.  Further evaluation with MRI if clinically warranted.  Subtle decrease in prominence of the right MCA vascularity on the 1st phase of imaging in comparison the left with fill-in on the 2nd phase of imaging.  No occlusion is seen.  Right upper lobe 5 mm solid pulmonary nodule.  For a solid nodule <6 mm, Fleischner Society 2017 guidelines recommend no routine follow up for a low risk patient, or follow-up with  non-contrast chest CT at 12 months in a high risk patient.  Mid to upper esophageal distension with intraluminal gas and fluid contents, which may be related to reflux versus soft geode dysmotility, and could place patient at risk for aspiration.  Bilateral upper lobe mild nonspecific peribronchial thickening with centrilobular patchy ground-glass attenuation which can be seen with small vessel disease including pulmonary edema, small airways disease with early changes of bronchopneumonia, or aspiration.  Further evaluation/follow-up as warranted.      Cardiac Imaging     Echo w/ CFD 6/11/18  CONCLUSIONS  Normal LA     1 - Normal left ventricular systolic function (EF 55-60%).     2 - Normal left ventricular diastolic function.     3 - Normal right ventricular systolic function.      MADELEINE Torres  Comprehensive Stroke Center  Department of Vascular Neurology   [facility]

## 2018-06-13 NOTE — ASSESSMENT & PLAN NOTE
Lasix held, moniter I/O, Resp Status and CXR.  CXR currently stable  Continue Duoneb and fluticasone  Believe wears Home O2

## 2018-06-13 NOTE — PT/OT/SLP PROGRESS
Occupational Therapy   Treatment    Name: Franklin Christian  MRN: [id number]  Admitting Diagnosis:  Embolic stroke involving left middle cerebral artery       Recommendations:     Discharge Recommendations: home health OT ( Daughter can provide 24/hr assist)  Discharge Equipment Recommendations:  walker, rolling  Barriers to discharge:  Decreased caregiver support    Subjective     Communicated with: RN prior to session.  Pt agreeable to participate with therapy. No family present during session.  present to translate.     Pain/Comfort:  · Pain Rating 1: 0/10  · Pain Rating Post-Intervention 1: 0/10    Patients cultural, spiritual, Yazidism conflicts given the current situation: None    Objective:     Patient found with: blood pressure cuff, telemetry, peripheral IV, pulse ox (continuous), oxygen, PureWick    General Precautions: Standard, aspiration, fall   Orthopedic Precautions:N/A   Braces: N/A     Occupational Performance:    Bed Mobility:    · Patient completed Rolling/Turning to Right with stand by assistance  · Patient completed Supine to Sit with contact guard assistance  · Patient completed Sit to Supine with minimum assistance     Functional Mobility/Transfers:  · Patient completed Sit <> Stand Transfer with contact guard assistance  with  no assistive device   · Patient completed Toilet Transfer Stand Pivot technique with contact guard assistance with  grab bars  · Functional Mobility: Pt completed functional mobility from EOB <> bathroom with CGA and HHA for balance and safety. Pt has one LOB during mobility however able to self correct.    Activities of Daily Living:  · Feeding:  independence  · Grooming: standing at sink to wash face, brush hair and wash hands;  With CGA- SBA for balance and safety  · UB Dressing: minimum assistance to lexis gown like jacket while seated EOB  · Toileting: contact guard assistance to complete toileting and hgyiene    Patient left with bed in chair position with  all lines intact, call button in reach and Rn notified    [facility] 6 Click:  [facility] Total Score: 20    Treatment & Education:  -Pt edu on OT role/POC  -Importance of OOB activity with staff assistance-- UIC during each meal   -Safety during functional t/f and mobility  - White board updated  - Therapy session focused on self care tasks-- assistance level noted above   Education:    Assessment:     Franklin Christian is a 72 y.o. female with a medical diagnosis of Embolic stroke involving left middle cerebral artery.  She presents alert and motivated to participate with therapy. Pt demo's improvement in assistance level with ADL and functional mobility this date.  Performance deficits affecting function are weakness, impaired endurance, impaired self care skills, impaired functional mobilty, decreased upper extremity function, decreased lower extremity function, impaired balance, gait instability.  Therapy recommendations changed to home with HHOT and support of family following d/c to continue to progress towards goals and improve quality of life.    Rehab Prognosis:  Good; patient would benefit from acute skilled OT services to address these deficits and reach maximum level of function.       Plan:     Patient to be seen 6 x/week to address the above listed problems via self-care/home management, therapeutic activities, therapeutic exercises, neuromuscular re-education  · Plan of Care Expires: 06/25/18  · Plan of Care Reviewed with: patient, daughter    This Plan of care has been discussed with the patient who was involved in its development and understands and is in agreement with the identified goals and treatment plan    GOALS:    Occupational Therapy Goals        Problem: Occupational Therapy Goal    Goal Priority Disciplines Outcome Interventions   Occupational Therapy Goal     OT, PT/OT Ongoing (interventions implemented as appropriate)    Description:  Goals to be met by: 6/25/18     Patient will increase  functional independence with ADLs by performing:    UE Dressing with Gentry.  LE Dressing with Modified Gentry.  Grooming while standing at sink with Modified [place].  Toileting from toilet with Modified [place]entry for hygiene and clothing management.   Bathing from  shower chair/bench with Modified Gentry.  Toilet transfer to toilet with Modified Gentry.  Increased functional strength to WFL for B UE.  Upper extremity exercise program x15 reps per handout, with independence.  Pt will be Ox4.  Pt will state what to do in an emergency c 100% accuracy.                        Time Tracking:     OT Date of Treatment: 06/13/18  OT Start Time: 1321  OT Stop Time: 1344  OT Total Time (min): 23 min    Billable Minutes:Self Care/Home Management 23    Dania mccann OT  6/13/2018

## 2018-06-13 NOTE — TELEPHONE ENCOUNTER
Received call from MCKAY Rose, at [facility]. Dr. Chase Finch called her for clarification on home insulin dosing.  It is as follows:    Novolog Flexpen  - 65 units with breakfast  - 30 units with lunch  - 55 units with dinner    Her number is [phone].    Karina Aldridge PA-C  [facility]  Pager: [phone]

## 2018-06-13 NOTE — PROGRESS NOTES
[facility]  Neurocritical Care  Progress Note    Admit Date: 6/10/2018  Service Date: 06/13/2018  Length of Stay: 3    Subjective:     Chief Complaint: Embolic stroke involving left middle cerebral artery    History of Present Illness: Ms. Christian is a 73 yo  female with COPD, DM 2, HTN, hx MI, CKD 3 and reported pacemaker per chart, who was admitted at [facility] for SOB  accompanied by abdominal pain, chest pain, and productive cough and dysuria as well as vaginal bleeding x 1 month (hx of uterine cancer, follows gynecologist outpatient). Admitted for what sounds like CHF as well as copd exacerbation and treated accordingly. (see medicine note from [place] for more details in this treatment).     Sent to us after tpa given when she had acute onset of drowsiness and aphasia earlier today. (Time of tpa not found in documentation and nursing report given to ED RN who has not communicated transition of care to NCC RN).    On exam she is arousable to voice, follows commands intermittently and has RUE drift. CTAMP without LVO. Admitted to [facility] for post tpa monitoring .               Hospital Course: 6/11: some asterixis noted left hand by nurse as well as paresthesia right arm likely reflecting left subcortical parietal/thalamic involvement by stroke, asa started  6/12- off cardene, CT head at 8 am with evolving left MCA territory stroke, no hemorrhage - exam remains improved and sputum production less frequent. On minimal home o2. Stable for ttf to stroke service  6/13- Boarding CVA service, needs Rehab for discharge placment    Interval History:   -boarding for CVA service  -pending placement      Review of Systems   Respiratory: Negative for apnea, cough, choking, chest tightness, shortness of breath, wheezing and stridor.    Cardiovascular: Negative for chest pain, palpitations and leg swelling.   Gastrointestinal: Negative for nausea and vomiting.   Neurological: Negative for  headaches.   Psychiatric/Behavioral: Negative for agitation.       Objective:     Vitals:  Temp: 98.5 °F (36.9 °C)  Pulse: 80  Rhythm: paced rhythm  BP: 114/65  MAP (mmHg): 84  Resp: (!) 25  SpO2: 95 %  O2 Device (Oxygen Therapy): nasal cannula w/ humidification    Temp  Min: 97.8 °F (36.6 °C)  Max: 99 °F (37.2 °C)  Pulse  Min: 72  Max: 85  BP  Min: 114/65  Max: 172/77  MAP (mmHg)  Min: 84  Max: 119  Resp  Min: 12  Max: 49  SpO2  Min: 93 %  Max: 98 %    06/12 0701 - 06/13 0700  In: 387.5 [P.O.:200; I.V.:187.5]  Out: 400 [Urine:400]   Unmeasured Output  Urine Occurrence: 1  Pad Count: 2       Physical Exam   Constitutional: She appears well-developed.   Cardiovascular: Normal rate, regular rhythm, normal heart sounds and intact distal pulses.    Pulmonary/Chest: Effort normal and breath sounds normal.   Abdominal: Soft. Bowel sounds are normal.   Neurological:   E4 V5 M6  AAO x 3, [place] speaking  GAMA, follows commands.  Strength appears fairly equal. Maybe mild right weakness     Skin: Skin is warm. Capillary refill takes less than 2 seconds.   Vitals reviewed.        Medications:  Continuous Scheduled  albuterol-ipratropium 3 mL Q6H   aspirin 81 mg Daily   atorvastatin 40 mg Daily   buPROPion 75 mg TID   carvedilol 6.25 mg BID   famotidine 20 mg BID   fluticasone 2 spray Daily   furosemide 20 mg Daily   heparin (porcine) 5,000 Units Q12H   hydrALAZINE 50 mg Q8H   insulin aspart U-100 4 Units TIDWM   senna-docusate 8.6-50 mg 1 tablet Daily   sodium chloride 0.9% 3 mL Q8H   PRN  acetaminophen 650 mg Q6H PRN   benzonatate 100 mg TID PRN   dextrose 50% 12.5 g PRN   dextrose 50% 25 g PRN   glucagon (human recombinant) 1 mg PRN   glucose 16 g PRN   glucose 24 g PRN   insulin aspart U-100 1-10 Units QID (AC + HS) PRN   labetalol 10 mg Q15 Min PRN     Today I personally reviewed pertinent medications, lines/drains/airways, imaging, cardiology, lab results, microbiology results, notably:    Diet  Diet diabetic [facility]  "Facility; 1500 Calorie  Diet diabetic [facility]; 1500 Calorie        Assessment/Plan:     Neuro   * Embolic stroke involving left middle cerebral artery    --admitted to [facility] post tpa  --no LVO  --vascular neuro following  --PTOTSlp  --atorvastatin, aspirin, sqh started  --sbp <180  -- diet started- swallowing well.   --f/u echo, most recent EF 55%  --patient has pacemaker, unable to get MR  --CT head without interval worsening, ttf under Vascular Neurology        Pulmonary   Pulmonary nodule    Pt with hx COPD  RUL 5 mm solid pulmonary nodule  For a solid nodule <6 mm, Fleischner Society 2017 guidelines recommend  follow-up with non-contrast chest CT at 12 months in a high risk patient        Pulmonary edema    -Lasix changed to PO,   -CXR stable, no acute change in O2 needs   -Serum Bicarb increasing, follow         COPD exacerbation    --continue duonebs and add ICS if needed  --sputum production less today, comfortable on home o2 NC          Cardiac/Vascular   Mixed hyperlipidemia    On Lipitor         Essential hypertension    Continue Coreg  SBP <180          Renal/   Vaginal bleeding      Per H&P - " 1 month of vaginal bleeding. She has a gynecologist appt on June 20. She reports that she previously had uterine cancer of some sort. "   H&H remains stable            Endocrine   Type 2 diabetes mellitus, with long-term current use of insulin    --SSI while npo  --a1c 10  -starting scheduled insulin TID WM now that diet starting.   -Would question home compliance with diet or insulin as requirements in Hospital minimal        Other   Received tissue plasminogen activator (t-PA) less than 24 hours prior to arrival    --see stroke        SOB (shortness of breath)    Lasix changed to PO, moniter I/O, Resp Status and CXR.  CXR currently stable  Continue Duoneb and fluticasone  Believe wears Home O2              Prophylaxis:  Venous Thromboembolism: mechanical chemical  Stress Ulcer: H2B PPI  Ventilator " Pneumonia: not applicable     Activity Orders          None        Full Code  Level 3  I spent >35 minutes reviewing patient records, examining, and counseling the patient with greater than 50% of the time spent with direct patient care and coordination.     Shon Lopez NP  Neurocritical Care  [facility]

## 2018-06-13 NOTE — SUBJECTIVE & OBJECTIVE
Interval History:   -boarding for CVA service  -pending placement      Review of Systems   Respiratory: Negative for apnea, cough, choking, chest tightness, shortness of breath, wheezing and stridor.    Cardiovascular: Negative for chest pain, palpitations and leg swelling.   Gastrointestinal: Negative for nausea and vomiting.   Neurological: Negative for headaches.   Psychiatric/Behavioral: Negative for agitation.       Objective:     Vitals:  Temp: 98.5 °F (36.9 °C)  Pulse: 80  Rhythm: paced rhythm  BP: 114/65  MAP (mmHg): 84  Resp: (!) 25  SpO2: 95 %  O2 Device (Oxygen Therapy): nasal cannula w/ humidification    Temp  Min: 97.8 °F (36.6 °C)  Max: 99 °F (37.2 °C)  Pulse  Min: 72  Max: 85  BP  Min: 114/65  Max: 172/77  MAP (mmHg)  Min: 84  Max: 119  Resp  Min: 12  Max: 49  SpO2  Min: 93 %  Max: 98 %    06/12 0701 - 06/13 0700  In: 387.5 [P.O.:200; I.V.:187.5]  Out: 400 [Urine:400]   Unmeasured Output  Urine Occurrence: 1  Pad Count: 2       Physical Exam   Constitutional: She appears well-developed.   Cardiovascular: Normal rate, regular rhythm, normal heart sounds and intact distal pulses.    Pulmonary/Chest: Effort normal and breath sounds normal.   Abdominal: Soft. Bowel sounds are normal.   Neurological:   E4 V5 M6   Skin: Skin is warm. Capillary refill takes less than 2 seconds.   Vitals reviewed.        Medications:  Continuous Scheduled  albuterol-ipratropium 3 mL Q6H   aspirin 81 mg Daily   atorvastatin 40 mg Daily   buPROPion 75 mg TID   carvedilol 6.25 mg BID   famotidine 20 mg BID   fluticasone 2 spray Daily   furosemide 20 mg Daily   heparin (porcine) 5,000 Units Q12H   hydrALAZINE 50 mg Q8H   insulin aspart U-100 4 Units TIDWM   senna-docusate 8.6-50 mg 1 tablet Daily   sodium chloride 0.9% 3 mL Q8H   PRN  acetaminophen 650 mg Q6H PRN   benzonatate 100 mg TID PRN   dextrose 50% 12.5 g PRN   dextrose 50% 25 g PRN   glucagon (human recombinant) 1 mg PRN   glucose 16 g PRN   glucose 24 g PRN   insulin  aspart U-100 1-10 Units QID (AC + HS) PRN   labetalol 10 mg Q15 Min PRN     Today I personally reviewed pertinent medications, lines/drains/airways, imaging, cardiology, lab results, microbiology results, notably:    Diet  [facility] 1500 Calorie  Diet diabetic [facility]; 1500 Calorie

## 2018-06-13 NOTE — PT/OT/SLP PROGRESS
"Physical Therapy Treatment    Patient Name:  Franklin Christian   MRN:  [id number]  Admitting Diagnosis: Embolic stroke involving left middle cerebral artery  Recent Surgery: * No surgery found *      Recommendations:     Discharge Recommendations:  Home with HHPT (changed approved by PT, Erin Gutierrez, after discussing pt's current level of function and family's ability to provide sup and assistance)   Discharge Equipment Recommendations: walker, rolling (approved by PT, Erin Gutierrez)  Barriers to discharge: Inaccessible home (14-16 steps to enter daughter's apartment)      Plan:     During this hospitalization, patient to be seen 5 x/week to address the above listed problems via gait training, therapeutic activities, therapeutic exercises, neuromuscular re-education  · Plan of Care Expires:  07/11/18   Plan of Care Reviewed with: patient, daughter    This Plan of care has been discussed with the patient who was involved in its development and understands and is in agreement with the identified goals and treatment plan    Subjective     Communicated with RN (Katheryn) prior to session.     Pt's daughter states "I want to take her home"  And reports feeling comfortable with pt's current level of function    Patient comments: "I've been coughing a lot"  Pain/Comfort:  · Pain Rating 1: 7/10  · Location - Orientation 1: generalized  · Location 1: abdomen (chest "from coughing")  · Pain Addressed 1: Pre-medicate for activity, Distraction  · Pain Rating Post-Intervention 1:  (NO rating provided)    Objective:     Patient found with: blood pressure cuff, oxygen, pulse ox (continuous), SCD, telemetry, PureWick    Patient found sup in bede upon PT entry to room, agreeable to treatment.  NO family present in the room.    General Precautions: Standard, Cardiac aspiration, fall   Orthopedic Precautions:N/A   Braces: N/A       BED MOBILITY (vc's for hand placement sequencing of task):        Rolling to the R with SBA with no use of " bedrail       Sup > sit at the EOB with SBA from R side lying (increased time)       Sit > sup Not performed 2* pt left seated [facility]        Scooting hips to the EOB upon sitting with close sup x2-3 scoots          SITTING AT THE EDGE OF THE BED    Assistance Level Required: sup for safety with no UE support      TRANSFERS  (vc's for hand placement, sequencing of task and safety)   Patient completed Sit <> Stand Transfer from EOB/BS chair and w/c with SBA/sup for safety with rolling walker xmultiple trials   Patient completed Stand <> Sit Transfer to BSC and w/c with SBA/sup for safety with rolling walker        GAIT: RN present with portable monitor, daughter brings O2 tank and w/c   Patient ambulated: 150ft   Patient required: SBA   Patient used:  Rolling Walker   Gait Pattern observed: reciprocal gait   Gait Deviation(s): decreased step length and decreased stride length fast leelee   Comments: vc's for safety       STAIRS  Pt ascended/descended 14 stair(s) with No Assistive Device with left handrail (sideways approach) with Stand-by Assistance and Contact Guard Assistance with vc's for Sequencing of LE's, hand placement, speed of task and safety.     EDUCATION  Education provided to pt regarding: stair training and need for RW.    Whiteboard updated with correct mobility information. RN/PCT notified.  Pt safe to transfer and amb short distances with RN/PCT and family: Use RW/no AD with SBA.    Patient left up in chair, with all lines intact, call button in reach, RN notified and family present    AM-PAC 6 CLICK MOBILITY  Turning over in bed (including adjusting bedclothes, sheets and blankets)?: 4  Sitting down on and standing up from a chair with arms (e.g., wheelchair, bedside commode, etc.): 3  Moving from lying on back to sitting on the side of the bed?: 3  Moving to and from a bed to a chair (including a wheelchair)?: 3  Need to walk in hospital room?: 3  Climbing 3-5 steps with a railing?: 3  Total Score:  19     Assessment:     Franklin Christian is a 72 y.o. female admitted with a medical diagnosis of Embolic stroke involving left middle cerebral artery.  She presents with the following impairments/functional limitations:  weakness, impaired endurance, impaired self care skills, gait instability, impaired balance, decreased lower extremity function, pain, impaired cardiopulmonary response to activity. Pt doing well functionally and tolerating activity well being able to perform 14 steps with single rail and amb 150ft afterwards without seated rest break.  Pt demonstrates no LOB and daughter expresses feeling comfortable caring for her at home.  Pt will cont to benefit from skilled PT intervention to address deficits and improve functional mobility.     Rehab Prognosis:  Good; patient would benefit from acute skilled PT services to address these deficits and reach maximum level of function.      GOALS:    Physical Therapy Goals        Problem: Physical Therapy Goal    Goal Priority Disciplines Outcome Goal Variances Interventions   Physical Therapy Goal     PT/OT, PT Ongoing (interventions implemented as appropriate)     Description:    Goals to be met by 6/22/2018    1. Pt will perform supine to sit from both sides of the bed with SBA.  2. Pt will perform sit to supine with SBA.  3. Pt will stand x 5 minutes with CGA and no LOB while performing dynamic UE tasks to prepare for functional tasks in standing  4. Pt will perform sit to stand transfers with CGA.   MET 6/13/2018  5. Pt will perform bed <> chair transfers with SBA.   MET 6/13/2018  6. Pt will perform gait x 50 feet with SBA using appropriate AD.   MET 6/13/2018  7. Pt will ascend and descend 2 flights of stairs with hand rail in order to access her dtr's home (desired d/c destination)                       Time Tracking:     PT Received On: 06/13/18  PT Start Time: 1004     PT Stop Time: 1057  PT Total Time (min): 53 min     Billable Minutes: Gait Training 30  and Therapeutic Activity 23    Treatment Type: Treatment  PT/PTA: PTA     PTA Visit Number: 1       Esther Dennison PTA.  Pager [phone]    6/13/2018    .

## 2018-06-13 NOTE — SUBJECTIVE & OBJECTIVE
Interval History 6/13/2018:  Patient is seen for follow-up rehab evaluation and recommendations: No acute events over night.  Progressing with therapy.    HPI, Past Medical, Family, and Social History remains the same as documented in the initial encounter.    Scheduled Medications:    albuterol-ipratropium  3 mL Nebulization Q6H    aspirin  81 mg Oral Daily    atorvastatin  40 mg Per NG tube Daily    buPROPion  75 mg Oral TID    carvedilol  6.25 mg Oral BID    famotidine  20 mg Oral BID    fluticasone  2 spray Each Nare Daily    [START ON 6/14/2018] furosemide  20 mg Oral Daily    heparin (porcine)  5,000 Units Subcutaneous Q12H    hydrALAZINE  50 mg Per NG tube Q8H    insulin aspart U-100  4 Units Subcutaneous TIDWM    senna-docusate 8.6-50 mg  1 tablet Per NG tube Daily    sodium chloride 0.9%  3 mL Intravenous Q8H     PRN Medications: acetaminophen, benzonatate, dextrose 50%, dextrose 50%, glucagon (human recombinant), glucose, glucose, insulin aspart U-100, labetalol    Review of Systems   Constitutional: Negative for chills, fatigue and fever.   HENT: Negative for drooling, hearing loss, trouble swallowing and voice change.    Eyes: Negative for pain and visual disturbance.   Respiratory: Negative for cough, shortness of breath and wheezing.    Cardiovascular: Negative for chest pain and palpitations.   Gastrointestinal: Negative for abdominal distention, nausea and vomiting.   Genitourinary: Negative for difficulty urinating and flank pain.   Musculoskeletal: Positive for gait problem. Negative for arthralgias, back pain, myalgias and neck pain.   Skin: Negative for rash and wound.   Neurological: Positive for weakness and numbness. Negative for dizziness and headaches.   Psychiatric/Behavioral: Negative for agitation and hallucinations. The patient is not nervous/anxious.      Objective:     Vital Signs (Most Recent):  Temp: 98.5 °F (36.9 °C) (06/13/18 1105)  Pulse: 71 (06/13/18 1400)  Resp: 17  (18 1400)  BP: (!) 163/71 (18 1400)  SpO2: 98 % (18 1400)    Vital Signs (24h Range):  Temp:  [97.8 °F (36.6 °C)-99 °F (37.2 °C)] 98.5 °F (36.9 °C)  Pulse:  [69-85] 71  Resp:  [12-49] 17  SpO2:  [93 %-100 %] 98 %  BP: (105-172)/(56-92) 163/71     Physical Exam   Constitutional: She is oriented to person, place, and time. She appears well-developed and well-nourished. No distress.   HENT:   Head: Normocephalic and atraumatic.   Right Ear: External ear normal.   Left Ear: External ear normal.   Nose: Nose normal.   Eyes: Right eye exhibits no discharge. Left eye exhibits no discharge. No scleral icterus.   Neck: Normal range of motion.   Cardiovascular: Normal rate, regular rhythm and intact distal pulses.    Pulmonary/Chest: Effort normal. No respiratory distress. She has no wheezes.   Abdominal: Soft. She exhibits no distension. There is no tenderness.   Musculoskeletal: Normal range of motion. She exhibits no edema or tenderness.   BLE edema   Neurological: She is alert and oriented to person, place, and time. She exhibits normal muscle tone.   -  Mental Status:  AAOx3.  Follows commands.  Answers correct age and .  -  Speech and language:  no aphasia or dysarthria.      -  Facial movement (CN VII): symmetrical.   -  Motor:  RUE: 4/5.  LUE: 5/5.  RLE: 4/5.  LLE: 5/5.  -  Sensory:  Intact to light touch and pin prick.   Skin: Skin is warm and dry. No rash noted.   Psychiatric: She has a normal mood and affect. Her behavior is normal. Thought content normal.   Vitals reviewed.    Diagnostic Results:   Labs: Reviewed  X-Ray: Reviewed  CT: Reviewed  CTA: Reviewed    NEUROLOGICAL EXAMINATION:     MENTAL STATUS   Oriented to person, place, and time.

## 2018-06-14 VITALS
DIASTOLIC BLOOD PRESSURE: 75 MMHG | OXYGEN SATURATION: 91 % | TEMPERATURE: 98 F | WEIGHT: 264.56 LBS | HEART RATE: 79 BPM | SYSTOLIC BLOOD PRESSURE: 182 MMHG | BODY MASS INDEX: 51.94 KG/M2 | RESPIRATION RATE: 28 BRPM | HEIGHT: 60 IN

## 2018-06-14 PROBLEM — Z91.199 NONCOMPLIANCE: Status: ACTIVE | Noted: 2018-06-14

## 2018-06-14 LAB
ALBUMIN SERPL BCP-MCNC: 2.4 G/DL
ALP SERPL-CCNC: 106 U/L
ALT SERPL W/O P-5'-P-CCNC: 15 U/L
ANION GAP SERPL CALC-SCNC: 8 MMOL/L
AST SERPL-CCNC: 18 U/L
BACTERIA SPEC AEROBE CULT: NORMAL
BASOPHILS # BLD AUTO: 0.03 K/UL
BASOPHILS NFR BLD: 0.3 %
BILIRUB SERPL-MCNC: 0.7 MG/DL
BUN SERPL-MCNC: 27 MG/DL
CALCIUM SERPL-MCNC: 8.8 MG/DL
CHLORIDE SERPL-SCNC: 105 MMOL/L
CO2 SERPL-SCNC: 29 MMOL/L
CREAT SERPL-MCNC: 1.4 MG/DL
DIFFERENTIAL METHOD: ABNORMAL
EOSINOPHIL # BLD AUTO: 0.2 K/UL
EOSINOPHIL NFR BLD: 2.5 %
ERYTHROCYTE [DISTWIDTH] IN BLOOD BY AUTOMATED COUNT: 13.5 %
EST. GFR  (AFRICAN AMERICAN): 43.9 ML/MIN/1.73 M^2
EST. GFR  (NON AFRICAN AMERICAN): 38.1 ML/MIN/1.73 M^2
GLUCOSE SERPL-MCNC: 199 MG/DL
GRAM STN SPEC: NORMAL
HCT VFR BLD AUTO: 36.5 %
HGB BLD-MCNC: 11.1 G/DL
IMM GRANULOCYTES # BLD AUTO: 0.22 K/UL
IMM GRANULOCYTES NFR BLD AUTO: 2.5 %
LYMPHOCYTES # BLD AUTO: 1.6 K/UL
LYMPHOCYTES NFR BLD: 18.5 %
MAGNESIUM SERPL-MCNC: 1.6 MG/DL
MCH RBC QN AUTO: 29.4 PG
MCHC RBC AUTO-ENTMCNC: 30.4 G/DL
MCV RBC AUTO: 97 FL
MONOCYTES # BLD AUTO: 0.6 K/UL
MONOCYTES NFR BLD: 6.3 %
NEUTROPHILS # BLD AUTO: 6.2 K/UL
NEUTROPHILS NFR BLD: 69.9 %
NRBC BLD-RTO: 0 /100 WBC
PHOSPHATE SERPL-MCNC: 3.6 MG/DL
PLATELET # BLD AUTO: 253 K/UL
PMV BLD AUTO: 10.9 FL
POCT GLUCOSE: 176 MG/DL (ref 70–110)
POTASSIUM SERPL-SCNC: 4.2 MMOL/L
PROT SERPL-MCNC: 6.1 G/DL
RBC # BLD AUTO: 3.77 M/UL
SODIUM SERPL-SCNC: 142 MMOL/L
WBC # BLD AUTO: 8.82 K/UL

## 2018-06-14 PROCEDURE — 99232 SBSQ HOSP IP/OBS MODERATE 35: CPT | Mod: ,,, | Performed by: NURSE PRACTITIONER

## 2018-06-14 PROCEDURE — 25000003 PHARM REV CODE 250: Performed by: PHYSICIAN ASSISTANT

## 2018-06-14 PROCEDURE — 85025 COMPLETE CBC W/AUTO DIFF WBC: CPT

## 2018-06-14 PROCEDURE — 99239 HOSP IP/OBS DSCHRG MGMT >30: CPT | Mod: GC,,, | Performed by: PSYCHIATRY & NEUROLOGY

## 2018-06-14 PROCEDURE — 25000003 PHARM REV CODE 250: Performed by: NURSE PRACTITIONER

## 2018-06-14 PROCEDURE — 83735 ASSAY OF MAGNESIUM: CPT

## 2018-06-14 PROCEDURE — 84100 ASSAY OF PHOSPHORUS: CPT

## 2018-06-14 PROCEDURE — A4216 STERILE WATER/SALINE, 10 ML: HCPCS | Performed by: STUDENT IN AN ORGANIZED HEALTH CARE EDUCATION/TRAINING PROGRAM

## 2018-06-14 PROCEDURE — 63600175 PHARM REV CODE 636 W HCPCS: Performed by: PHYSICIAN ASSISTANT

## 2018-06-14 PROCEDURE — 25000003 PHARM REV CODE 250: Performed by: STUDENT IN AN ORGANIZED HEALTH CARE EDUCATION/TRAINING PROGRAM

## 2018-06-14 PROCEDURE — 97803 MED NUTRITION INDIV SUBSEQ: CPT

## 2018-06-14 PROCEDURE — 25000003 PHARM REV CODE 250: Performed by: PSYCHIATRY & NEUROLOGY

## 2018-06-14 PROCEDURE — 80053 COMPREHEN METABOLIC PANEL: CPT

## 2018-06-14 RX ORDER — INSULIN GLARGINE 100 [IU]/ML
3 INJECTION, SOLUTION SUBCUTANEOUS DAILY
Qty: 3 ML | Refills: 11 | Status: SHIPPED | OUTPATIENT
Start: 2018-06-14 | End: 2018-06-14

## 2018-06-14 RX ORDER — INSULIN GLARGINE 100 [IU]/ML
3 INJECTION, SOLUTION SUBCUTANEOUS DAILY
Qty: 3 ML | Refills: 11 | Status: ON HOLD | OUTPATIENT
Start: 2018-06-14 | End: 2019-01-23 | Stop reason: HOSPADM

## 2018-06-14 RX ORDER — ATORVASTATIN CALCIUM 40 MG/1
40 TABLET, FILM COATED ORAL DAILY
Qty: 90 TABLET | Refills: 3 | Status: SHIPPED | OUTPATIENT
Start: 2018-06-15 | End: 2018-06-14

## 2018-06-14 RX ORDER — INSULIN ASPART 100 [IU]/ML
4 INJECTION, SOLUTION INTRAVENOUS; SUBCUTANEOUS 3 TIMES DAILY
Qty: 15 ML | Refills: 11 | Status: SHIPPED | OUTPATIENT
Start: 2018-06-14 | End: 2019-06-14

## 2018-06-14 RX ORDER — BENZONATATE 100 MG/1
100 CAPSULE ORAL 3 TIMES DAILY PRN
Qty: 30 CAPSULE | Refills: 0 | Status: SHIPPED | OUTPATIENT
Start: 2018-06-14 | End: 2018-06-24

## 2018-06-14 RX ORDER — INSULIN ASPART 100 [IU]/ML
4 INJECTION, SOLUTION INTRAVENOUS; SUBCUTANEOUS 3 TIMES DAILY
Qty: 15 ML | Refills: 11 | Status: SHIPPED | OUTPATIENT
Start: 2018-06-14 | End: 2018-06-14

## 2018-06-14 RX ORDER — ATORVASTATIN CALCIUM 40 MG/1
40 TABLET, FILM COATED ORAL DAILY
Qty: 90 TABLET | Refills: 3 | Status: SHIPPED | OUTPATIENT
Start: 2018-06-15 | End: 2019-06-15

## 2018-06-14 RX ORDER — NAPROXEN SODIUM 220 MG/1
81 TABLET, FILM COATED ORAL DAILY
Refills: 0 | COMMUNITY
Start: 2018-06-15 | End: 2018-06-14

## 2018-06-14 RX ORDER — NAPROXEN SODIUM 220 MG/1
81 TABLET, FILM COATED ORAL DAILY
Refills: 0 | COMMUNITY
Start: 2018-06-15 | End: 2019-06-15

## 2018-06-14 RX ADMIN — FUROSEMIDE 20 MG: 20 TABLET ORAL at 09:06

## 2018-06-14 RX ADMIN — CARVEDILOL 6.25 MG: 6.25 TABLET, FILM COATED ORAL at 09:06

## 2018-06-14 RX ADMIN — Medication 3 ML: at 06:06

## 2018-06-14 RX ADMIN — BUPROPION HYDROCHLORIDE 75 MG: 75 TABLET, FILM COATED ORAL at 09:06

## 2018-06-14 RX ADMIN — FAMOTIDINE 20 MG: 20 TABLET ORAL at 09:06

## 2018-06-14 RX ADMIN — HYDRALAZINE HYDROCHLORIDE 50 MG: 50 TABLET ORAL at 06:06

## 2018-06-14 RX ADMIN — INSULIN ASPART 4 UNITS: 100 INJECTION, SOLUTION INTRAVENOUS; SUBCUTANEOUS at 06:06

## 2018-06-14 RX ADMIN — ASPIRIN 81 MG CHEWABLE TABLET 81 MG: 81 TABLET CHEWABLE at 09:06

## 2018-06-14 RX ADMIN — HEPARIN SODIUM 5000 UNITS: 5000 INJECTION, SOLUTION INTRAVENOUS; SUBCUTANEOUS at 09:06

## 2018-06-14 RX ADMIN — ATORVASTATIN CALCIUM 40 MG: 20 TABLET, FILM COATED ORAL at 09:06

## 2018-06-14 NOTE — DISCHARGE SUMMARY
[facility]  Vascular Neurology  Comprehensive Stroke Center  Discharge Summary     Summary:     Admit Date: 6/10/2018  6:56 PM    Discharge Date and Time:  06/14/2018 4:27 PM    Attending Physician: No att. providers found     Discharge Provider: Wilber Soto MD    History of Present Illness: Patient is a 72 y.o. female with significant past medical history of COPD, DM II, HTN, CKD III, and hx of MI presented to hospital as a transfer from [facility] for evaluation of R MCA stroke symptoms.  HPI information gathered from a review of the medical record due to the patient's condition, no family at the bedside.  The patient was admitted to the OSH on 6/7 for acute asthma exacerbation.  Today, she acutely developed anisocoria, AMS after a duoneb tx. Patient was LKN ~1215.  There was no reported precipitating symptoms.  Nothing was reported to have exacerbated or alleviated the patient's symptoms.  A CTH was obtained and was negative for acute findings.  Telestroke consult performed by Dr. Khalil.  tPA administered.  Transferred to [facility] for possible intervention, higher level of care.  On arrival the patient was placed in [facility].  She is lethargic but following commands.  Taken to CT for CTA MP.  Dr. Piña reviewed images as acquired.  No LVO.  Patient not a candidate for IR intervention.  Will be admitted to [facility] for close monitoring.      Hospital Course : Patient presented with left MCA stroke. She received tPA. Initially she failed swallow evaluation and had an NGT placed. Over time her symptoms improved and she is able to eat. She was off the nicardipine drip on the 2nd day of admission. She did very well with PT that they recommended home health PT.   Patient uses oxygen at home sometimes. Nursing did a walk test which she passed without dropping her O2 below 88%. Her insulin regimen was adjusted as well for better BS control. An appointment with her PCP, Dr. Tellez, was  arranged as well.     STROKE DOCUMENTATION   Acute Stroke Times   Last Known Normal Date: 06/10/18  Last Known Normal Time: 1215  Symptom Onset Date: 06/10/18  Symptom Onset Time: 1215  Stroke Team Called Date: 06/10/18  Stroke Team Arrival Date: 06/10/18  CT Interpretation Time: 1325  Decision to Treat Time for Alteplase: 1345    NIH Scale:  1a. Level Of Consciousness: 0-->Alert: keenly responsive  1b. LOC Questions: 0-->Answers both questions correctly  1c. LOC Commands: 0-->Performs both tasks correctly  2. Best Gaze: 0-->Normal  3. Visual: 0-->No visual loss  4. Facial Palsy: 1-->Minor paralysis (flattened nasolabial fold, asymmetry on smiling)  5a. Motor Arm, Left: 0-->No drift: limb holds 90 (or 45) degrees for full 10 secs  5b. Motor Arm, Right: 0-->No drift: limb holds 90 (or 45) degrees for full 10 secs  6a. Motor Leg, Left: 0-->No drift: leg holds 30 degree position for full 5 secs  6b. Motor Leg, Right: 0-->No drift: leg holds 30 degree position for full 5 secs  7. Limb Ataxia: 0-->Absent  8. Sensory: 0-->Normal: no sensory loss  9. Best Language: 0-->No aphasia: normal  10. Dysarthria: 0-->Normal  11. Extinction and Inattention (formerly Neglect): 0-->No abnormality  Total (NIH Stroke Scale): 1            Modified Basin Score: 1  [place] Coma Scale:    ABCD2 Score:    [id number] Score:   HAS -BLED Score:   ICH Score:   Hunt & Diehl Classification:       Assessment/Plan:     Diagnostic Results:    Brain Imaging      CT Head 6/12/18  Evolving hypoattenuation left inferior frontal lobe somewhat more apparent on today's examination cannot exclude area vaulting infarction overall age indeterminate somewhat limited by motion.  There is no evidence for acute intracranial hemorrhage or is hydrocephalus.     CT Head 6/10/18 -- OSH  Inferior left frontal lobe more conspicuous area of hypoattenuation without significant volume loss, which may be related to artifact versus acute/subacute infarct. Further  evaluation/follow-up as warranted.  Age-related mild senescent changes as above.  Right sphenoid sinus disease.        Vessel Imaging      CTA Multiphase 6/10/18  Redemonstration of a left inferior frontal area of hypoattenuation similar to the prior exam without evidence for significant progression.  This may represent a acute or remote lacunar infarction.  Further evaluation with MRI if clinically warranted.  Subtle decrease in prominence of the right MCA vascularity on the 1st phase of imaging in comparison the left with fill-in on the 2nd phase of imaging.  No occlusion is seen.  Right upper lobe 5 mm solid pulmonary nodule.  For a solid nodule <6 mm, Fleischner Society 2017 guidelines recommend no routine follow up for a low risk patient, or follow-up with non-contrast chest CT at 12 months in a high risk patient.  Mid to upper esophageal distension with intraluminal gas and fluid contents, which may be related to reflux versus soft geode dysmotility, and could place patient at risk for aspiration.  Bilateral upper lobe mild nonspecific peribronchial thickening with centrilobular patchy ground-glass attenuation which can be seen with small vessel disease including pulmonary edema, small airways disease with early changes of bronchopneumonia, or aspiration.  Further evaluation/follow-up as warranted.         Cardiac Imaging      Echo w/ CFD 6/11/18  CONCLUSIONS  Normal LA     1 - Normal left ventricular systolic function (EF 55-60%).     2 - Normal left ventricular diastolic function.     3 - Normal right ventricular systolic function.  Interventions: IV t-PA    Complications: None    Disposition: [place]-Health Care [facility]    Final Active Diagnoses:    Diagnosis Date Noted POA    PRINCIPAL PROBLEM:  Embolic stroke involving left middle cerebral artery [I63.412] 06/10/2018 Yes    Type 2 diabetes mellitus, with long-term current use of insulin [E11.9, Z79.4] 06/07/2018 Not Applicable    Noncompliance [Z91.19]  06/14/2018 Not Applicable    Vaginal bleeding [N93.9] 06/13/2018 Yes    Cytotoxic cerebral edema [G93.6] 06/11/2018 Unknown    Pulmonary nodule [R91.1] 06/11/2018 Unknown    Esophageal dysphagia [R13.10] 06/11/2018 Unknown    Mixed hyperlipidemia [E78.2] 06/11/2018 Unknown    Received tissue plasminogen activator (t-PA) less than 24 hours prior to arrival [Z92.82] 06/10/2018 Not Applicable    Elevated serum creatinine [R79.89] 06/08/2018 Yes    SOB (shortness of breath) [R06.02] 06/07/2018 Yes    Essential hypertension [I10] 06/07/2018 Yes    COPD exacerbation [J44.1] 06/07/2018 Yes    Pulmonary edema [J81.1] 06/07/2018 Yes      Problems Resolved During this Admission:    Diagnosis Date Noted Date Resolved POA     * Embolic stroke involving left middle cerebral artery    72 y.o. female with significant past medical history of COPD, DM II, HTN, CKD III, and hx of MI presented to hospital as a transfer from [facility] for evaluation of R MCA stroke symptoms. Received tPA. No LVO, no intervention.    CTH with evolving hypoattenuation in the Left inferior frontal lobe which likely represents age-indeterminate infarction. Pt with PM, unable to obtain MRI to further characterize. Etiology likely small vessel disease (A1c 10.5%). Weakness much improved on exam    Pt approved for diet per SLP. Cardene off since 11 am on 6/12/18      Antithrombotics for secondary stroke prevention: Antiplatelets: Aspirin: 325 mg daily  Statins for secondary stroke prevention and hyperlipidemia, if present:   Statins: Atorvastatin- 40 mg daily  Aggressive risk factor modification: HTN, DM, HLD, Diet, Exercise, Obesity  Rehab efforts: PT/OT/SLP to evaluate and treat   Dispo: SNF vs home with daughter (CNA) and   Diagnostics ordered/pending: None   VTE prophylaxis: Heparin 5000 units SQ every 8 hours  BP parameters: Infarct: Post tPA, SBP <180        Type 2 diabetes mellitus, with long-term current use of insulin    - Stroke  risk factor   - A1c 10.5%  - Her BS were better controlled on 4 units of Aspart with meals. 3 units of detemir before bedtime was added prior to discharge.   - diabetic education was included in her home health orders.         Essential hypertension    -Stroke risk factor  SBP<180 s/p tPA  - restarted on her home meds         COPD exacerbation    -Admitted to OSH for asthma exacerbation.    - continue duonebs   - Patient with minimal cough prior to discharge.             Recommendations:     Post-discharge complication risks: None    Stroke Education given to: patient and family    Follow-up in Stroke Clinic in 4-6 weeks.      Discharge Plan:  Antithrombotics: Aspirin 81mg  Statin: Atorvastatin 40mg  Aggresive risk factor modification:  Hypertension  Diabetes  High Cholesterol  Obesity    Follow Up:  Follow-up Information     Rom Jacques MD On 6/19/2018.    Specialty:  Internal Medicine  Why:   Hospital [address]:  [address] [place] [address], [phone] at 9:40 am   Contact information:  [address] [place] [id number]  [phone]                   Patient Instructions:     Activity as tolerated         Medications:  Reconciled Home Medications:      Medication List      START taking these medications    aspirin 81 MG Chew  Take 1 tablet (81 mg total) by mouth once daily.  Start taking on:  6/15/2018     atorvastatin 40 MG tablet  Commonly known as:  LIPITOR  Take 1 tablet (40 mg total) by mouth once daily.  Start taking on:  6/15/2018     insulin aspart U-100 100 unit/mL Inpn pen  Commonly known as:  NovoLOG  Inject 4 Units into the skin 3 (three) times daily.        CHANGE how you take these medications    * benzonatate 100 MG capsule  Commonly known as:  TESSALON  Take 1 capsule (100 mg total) by mouth 3 (three) times daily as needed for Cough.  What changed:  Another medication with the same name was added. Make sure you understand how and when to take  each.     * benzonatate 100 MG capsule  Commonly known as:  TESSALON  Take 1 capsule (100 mg total) by mouth 3 (three) times daily as needed for Cough.  What changed:  You were already taking a medication with the same name, and this prescription was added. Make sure you understand how and when to take each.     insulin glargine 100 unit/mL injection  Commonly known as:  LANTUS  Inject 3 Units into the skin once daily.  What changed:  how much to take        * This list has 2 medication(s) that are the same as other medications prescribed for you. Read the directions carefully, and ask your doctor or other care provider to review them with you.            CONTINUE taking these medications    albuterol 90 mcg/actuation inhaler  Inhale 1-2 puffs into the lungs every 6 (six) hours as needed for Wheezing. Rescue     albuterol-ipratropium 2.5 mg-0.5 mg/3 mL nebulizer solution  Commonly known as:  DUO-NEB  Take 3 mLs by nebulization every 4 (four) hours. Rescue     baclofen 20 MG tablet  Commonly known as:  LIORESAL  Take 1 tablet (20 mg total) by mouth 3 (three) times daily.     buPROPion 100 MG TBSR 12 hr tablet  Commonly known as:  WELLBUTRIN SR  Take 1 tablet (100 mg total) by mouth 2 (two) times daily.     carvedilol 12.5 MG tablet  Commonly known as:  COREG  Take 1 tablet (12.5 mg total) by mouth 2 (two) times daily with meals.     dextromethorphan-guaifenesin  mg  mg per 12 hr tablet  Commonly known as:  MUCINEX DM  Take 1 tablet by mouth 2 (two) times daily.     famotidine 20 MG tablet  Commonly known as:  PEPCID  Take 1 tablet (20 mg total) by mouth once daily.     gabapentin 300 MG capsule  Commonly known as:  NEURONTIN  Take 1 capsule (300 mg total) by mouth every evening.     HYDROcodone-acetaminophen 5-325 mg per tablet  Commonly known as:  NORCO  Take 1 tablet by mouth every 6 (six) hours as needed for Pain.     oxybutynin 5 MG Tab  Commonly known as:  DITROPAN  Take 1 tablet (5 mg total) by  mouth 2 (two) times daily.        STOP taking these medications    methylPREDNISolone 4 mg tablet  Commonly known as:  MEDROL DOSEPACK     pantoprazole 40 MG tablet  Commonly known as:  PROTONIX            Wilber Soto MD  Comprehensive Stroke Center  Department of Vascular Neurology   [facility]

## 2018-06-14 NOTE — PROGRESS NOTES
[facility]  Neurocritical Care  Progress Note    Admit Date: 6/10/2018  Service Date: 06/14/2018  Length of Stay: 4    Subjective:     Chief Complaint: Embolic stroke involving left middle cerebral artery    History of Present Illness: Ms. Christian is a 73 yo  female with COPD, DM 2, HTN, hx MI, CKD 3 and reported pacemaker per chart, who was admitted at [facility] for SOB  accompanied by abdominal pain, chest pain, and productive cough and dysuria as well as vaginal bleeding x 1 month (hx of uterine cancer, follows gynecologist outpatient). Admitted for what sounds like CHF as well as copd exacerbation and treated accordingly. (see medicine note from [place] for more details in this treatment).     Sent to us after tpa given when she had acute onset of drowsiness and aphasia earlier today. (Time of tpa not found in documentation and nursing report given to ED RN who has not communicated transition of care to NCC RN).    On exam she is arousable to voice, follows commands intermittently and has RUE drift. CTAMP without LVO. Admitted to [facility] for post tpa monitoring .               Hospital Course: 6/11: some asterixis noted left hand by nurse as well as paresthesia right arm likely reflecting left subcortical parietal/thalamic involvement by stroke, asa started  6/12- off cardene, CT head at 8 am with evolving left MCA territory stroke, no hemorrhage - exam remains improved and sputum production less frequent. On minimal home o2. Stable for ttf to stroke service  6/13- Boarding CVA service, needs Rehab for discharge placment  6/14: discharge pending. Will clarify O2 needs before discharge    Interval History:   -boarding for CVA service  -pending placement      Review of Systems   Respiratory: Negative for apnea, cough, choking, chest tightness, shortness of breath, wheezing and stridor.    Cardiovascular: Negative for chest pain, palpitations and leg swelling.   Gastrointestinal:  Negative for abdominal distention, abdominal pain, nausea and vomiting.   Neurological: Negative for headaches.   Psychiatric/Behavioral: Negative for agitation.       Objective:     Vitals:  Temp: 98.1 °F (36.7 °C)  Pulse: 79  Rhythm: paced rhythm  BP: (!) 164/69  MAP (mmHg): 99  Resp: (!) 32  SpO2: (!) 94 %  O2 Device (Oxygen Therapy): nasal cannula    Temp  Min: 98.1 °F (36.7 °C)  Max: 98.8 °F (37.1 °C)  Pulse  Min: 69  Max: 79  BP  Min: 105/56  Max: 184/67  MAP (mmHg)  Min: 75  Max: 108  Resp  Min: 12  Max: 44  SpO2  Min: 94 %  Max: 100 %    06/13 0701 - 06/14 0700  In: -   Out: 550 [Urine:550]   Unmeasured Output  Urine Occurrence: 1  Stool Occurrence: 1  Pad Count: 2       Physical Exam   Constitutional: She appears well-developed.   Cardiovascular: Normal rate, regular rhythm, normal heart sounds and intact distal pulses.    Pulmonary/Chest: Effort normal and breath sounds normal.   Abdominal: Soft. Bowel sounds are normal.   Neurological:     E4 V5 M6  AAO x 3, Luxembourgish speaking  GAMA, follows commands.  Strength appears fairly equal. Maybe mild right weakness      Skin: Skin is warm. Capillary refill takes less than 2 seconds.   Vitals reviewed.        Medications:  Continuous Scheduled    aspirin 81 mg Daily   atorvastatin 40 mg Daily   buPROPion 75 mg TID   carvedilol 6.25 mg BID   famotidine 20 mg BID   fluticasone 2 spray Daily   fluticasone-salmeterol 250-50 mcg/dose 1 puff BID   furosemide 20 mg Daily   heparin (porcine) 5,000 Units Q12H   hydrALAZINE 50 mg Q8H   insulin aspart U-100 4 Units TIDWM   insulin detemir U-100 3 Units QHS   senna-docusate 8.6-50 mg 1 tablet Daily   sodium chloride 0.9% 3 mL Q8H   PRN    acetaminophen 650 mg Q6H PRN   benzonatate 100 mg TID PRN   dextrose 50% 12.5 g PRN   dextrose 50% 25 g PRN   glucagon (human recombinant) 1 mg PRN   glucose 16 g PRN   glucose 24 g PRN   insulin aspart U-100 1-10 Units QID (AC + HS) PRN   labetalol 10 mg Q15 Min PRN     Today I personally  reviewed pertinent medications, lines/drains/airways, imaging, cardiology, lab results, microbiology results, notably:    Diet  Diet diabetic [facility]; 1500 Calorie  Diet diabetic [facility]; 1500 Calorie        Assessment/Plan:     Neuro   * Embolic stroke involving left middle cerebral artery    --admitted to [facility] post tpa  --no LVO  --vascular neuro following  --PTOTSlp  --atorvastatin, aspirin, sqh started  --sbp <180  -- diet started- swallowing well.   --f/u echo, most recent EF 55%  --patient has pacemaker, unable to get MR  --CT head without interval worsening, ttf under Vascular Neurology        Psychiatric   Noncompliance    Based on HgbA1c being high on reported home insulin and stable Glucose in Hospital, concerns for noncompliance.  In addition, It appears she doesn't show for appointments/does not regularly follow up with primary care physician. The importance of this was stressed to the daughter who will be helping take care of the patient.   She needs f/u on COPD, CHF, Pulmonary Nodule, and spot uterine bleeding. Patient and daughter instructed on importance.         Pulmonary   Pulmonary nodule    Pt with hx COPD  RUL 5 mm solid pulmonary nodule  For a solid nodule <6 mm, Fleischner Society 2017 guidelines recommend  follow-up with non-contrast chest CT at 12 months in a high risk patient        Pulmonary edema    -Lasix Changed to PO,   -CXR stable, no acute change in O2 needs   Sat 88-92 on room air  Discussed the importance of monitor for fluid overload, Swollen ankles increasing Weight, SOb with daughter who will be helping take care of patient.   We also discussed the importance of being complaint with lasix.         COPD exacerbation    --continue duonebs and add ICS if needed  --sputum production less today, comfortable on home o2 N  --O2 st 88-92 on room air, Nursing performed walk test and stated O2 sat never decreased below 88%  --Discussed Home O2 with patient, Vascular  "Neurology team. Unsure whether patient actually has home O2, but based on walk test, Vascular neurology comfortable discharging patient without O2.                 Cardiac/Vascular   Mixed hyperlipidemia    On Lipitor         Essential hypertension    Continue Coreg  SBP <180          Renal/   Vaginal bleeding      Per H&P - " 1 month of vaginal bleeding. She has a gynecologist appt on June 20. She reports that she previously had uterine cancer of some sort. "   H&H remains stable   Bleeding is reported as occasional spotting per Nurse. No blood noted when heling patient from bed previous day.   It is important for her to have follow-up.            Endocrine   Type 2 diabetes mellitus, with long-term current use of insulin    --SSI while npo  --a1c 10  -starting scheduled insulin TID WM now that diet starting.   -Would question home compliance with diet or insulin as requirements in Hospital minimal  -Discussed importance of being compliant with diet and insulin with patient/family             Prophylaxis:  Venous Thromboembolism: mechanical chemical  Stress Ulcer: None  Ventilator Pneumonia: not applicable     Activity Orders          None        Full Code   Level 2    Shon Lopez NP  Neurocritical Care  [facility]    "

## 2018-06-14 NOTE — SUBJECTIVE & OBJECTIVE
Interval History:   -boarding for CVA service  -pending placement      Review of Systems   Respiratory: Negative for apnea, cough, choking, chest tightness, shortness of breath, wheezing and stridor.    Cardiovascular: Negative for chest pain, palpitations and leg swelling.   Gastrointestinal: Negative for abdominal distention, abdominal pain, nausea and vomiting.   Neurological: Negative for headaches.   Psychiatric/Behavioral: Negative for agitation.       Objective:     Vitals:  Temp: 98.1 °F (36.7 °C)  Pulse: 79  Rhythm: paced rhythm  BP: (!) 164/69  MAP (mmHg): 99  Resp: (!) 32  SpO2: (!) 94 %  O2 Device (Oxygen Therapy): nasal cannula    Temp  Min: 98.1 °F (36.7 °C)  Max: 98.8 °F (37.1 °C)  Pulse  Min: 69  Max: 79  BP  Min: 105/56  Max: 184/67  MAP (mmHg)  Min: 75  Max: 108  Resp  Min: 12  Max: 44  SpO2  Min: 94 %  Max: 100 %    06/13 0701 - 06/14 0700  In: -   Out: 550 [Urine:550]   Unmeasured Output  Urine Occurrence: 1  Stool Occurrence: 1  Pad Count: 2       Physical Exam   Constitutional: She appears well-developed.   Cardiovascular: Normal rate, regular rhythm, normal heart sounds and intact distal pulses.    Pulmonary/Chest: Effort normal and breath sounds normal.   Abdominal: Soft. Bowel sounds are normal.   Neurological:     E4 V5 M6  AAO x 3, Syriac speaking  GAMA, follows commands.  Strength appears fairly equal. Maybe mild right weakness      Skin: Skin is warm. Capillary refill takes less than 2 seconds.   Vitals reviewed.        Medications:  Continuous Scheduled    aspirin 81 mg Daily   atorvastatin 40 mg Daily   buPROPion 75 mg TID   carvedilol 6.25 mg BID   famotidine 20 mg BID   fluticasone 2 spray Daily   fluticasone-salmeterol 250-50 mcg/dose 1 puff BID   furosemide 20 mg Daily   heparin (porcine) 5,000 Units Q12H   hydrALAZINE 50 mg Q8H   insulin aspart U-100 4 Units TIDWM   insulin detemir U-100 3 Units QHS   senna-docusate 8.6-50 mg 1 tablet Daily   sodium chloride 0.9% 3 mL Q8H    PRN    acetaminophen 650 mg Q6H PRN   benzonatate 100 mg TID PRN   dextrose 50% 12.5 g PRN   dextrose 50% 25 g PRN   glucagon (human recombinant) 1 mg PRN   glucose 16 g PRN   glucose 24 g PRN   insulin aspart U-100 1-10 Units QID (AC + HS) PRN   labetalol 10 mg Q15 Min PRN     Today I personally reviewed pertinent medications, lines/drains/airways, imaging, cardiology, lab results, microbiology results, notably:    Diet  [facility]; 1500 Calorie  Diet diabetic [facility]; 1500 Calorie

## 2018-06-14 NOTE — ASSESSMENT & PLAN NOTE
--admitted to [facility] post tpa  --no LVO  --vascular neuro following  --PTOTSlp  --atorvastatin, aspirin, sqh started  --sbp <180  -- diet started- swallowing well.   --f/u echo, most recent EF 55%  --patient has pacemaker, unable to get MR  --CT head without interval worsening, ttf under Vascular Neurology

## 2018-06-14 NOTE — PLAN OF CARE
Problem: Patient Care Overview  Goal: Plan of Care Review  Outcome: Ongoing (interventions implemented as appropriate)  POC reviewed with pt and daughter at 0500. Pt verbalized understanding ( speaks [place] ) translated by daughter. Questions and concerns addressed. No acute events overnight. Plan is to transfer patient to step down today. Pt progressing toward goals. Will continue to monitor. See flowsheets for full assessment and VS info

## 2018-06-14 NOTE — PLAN OF CARE
[facility]    HOME HEALTH ORDERS  FACE TO FACE ENCOUNTER    Patient Name: Franklin Christian  YOB: 1946    PCP: Rom Jacques MD   PCP Address: [facility] / [facility]*  PCP Phone Number: [phone]  PCP Fax: [phone]    Encounter Date: 06/14/2018    Admit to Home Health    Diagnoses:  Active Hospital Problems    Diagnosis  POA    *Embolic stroke involving left middle cerebral artery [I63.412]  Yes    Vaginal bleeding [N93.9]  Yes    Cytotoxic cerebral edema [G93.6]  Unknown    Pulmonary nodule [R91.1]  Unknown    Esophageal dysphagia [R13.10]  Unknown    Mixed hyperlipidemia [E78.2]  Unknown    Received tissue plasminogen activator (t-PA) less than 24 hours prior to arrival [Z92.82]  Not Applicable    Elevated serum creatinine [R79.89]  Yes    SOB (shortness of breath) [R06.02]  Yes    Essential hypertension [I10]  Yes    Type 2 diabetes mellitus, with long-term current use of insulin [E11.9, Z79.4]  Not Applicable    COPD exacerbation [J44.1]  Yes    Pulmonary edema [J81.1]  Yes      Resolved Hospital Problems    Diagnosis Date Resolved POA   No resolved problems to display.       No future appointments.  Follow-up Information     Rom Jacques MD On 6/19/2018.    Specialty:  Internal Medicine  Why:   Hospital follow up:  [address] [place] [address], [phone] at 9:40 am   Contact information:  [address] [place] [address]  [phone]                     I have seen and examined this patient face to face today. My clinical findings that support the need for the home health skilled services and home bound status are the following:  Weakness/numbness causing balance and gait disturbance due to Stroke making it taxing to leave home.  Patient with medication mismanagement issues requiring home bound status as evidenced by  Poor understanding of medication  regimen/dosage, Poor adherence to medication regimen/dosage and Uncontrolled hyperglycemic/hypoglycemic events.    Allergies:Review of patient's allergies indicates:  No Known Allergies    Diet: diabetic diet: 2000 calorie    Activities: activity as tolerated    Nursing:   SN to complete comprehensive assessment including routine vital signs. Instruct on disease process and s/s of complications to report to MD. Review/verify medication list sent home with the patient at time of discharge  and instruct patient/caregiver as needed. Frequency may be adjusted depending on start of care date.    Notify MD if SBP > 160 or < 90; DBP > 90 or < 50; HR > 120 or < 50; Temp > 101      CONSULTS:    Physical Therapy to evaluate and treat. Evaluate for home safety and equipment needs; Establish/upgrade home exercise program. Perform / instruct on therapeutic exercises, gait training, transfer training, and Range of Motion.  Occupational Therapy to evaluate and treat. Evaluate home environment for safety and equipment needs. Perform/Instruct on transfers, ADL training, ROM, and therapeutic exercises.    MISCELLANEOUS CARE:  Diabetic Care:   SN to perform and educate Diabetic management with blood glucose monitoring:, Fingerstick blood sugar AC and HS and Report CBG < 60 or > 350 to physician.  Home Oxygen:  Assess oxygen saturation via pulse oximeter as needed for increase in SOB.  COPD: Teach patient MDI/HFA usage and guidelines  Please ensure that patient has a pulse oximeter and is educated on his normal oxygen saturations: 88-92%  Please ensure patient has a functioning nebulizer and provide education on its usage.      Medications:  Reconciled Home Medications:      Medication List      START taking these medications    aspirin 81 MG Chew  Take 1 tablet (81 mg total) by mouth once daily.  Start taking on:  6/15/2018     atorvastatin 40 MG tablet  Commonly known as:  LIPITOR  Take 1 tablet (40 mg total) by mouth once  daily.  Start taking on:  6/15/2018     insulin aspart U-100 100 unit/mL Inpn pen  Commonly known as:  NovoLOG  Inject 4 Units into the skin 3 (three) times daily.        CHANGE how you take these medications    insulin glargine 100 unit/mL injection  Commonly known as:  LANTUS  Inject 3 Units into the skin once daily.  What changed:  how much to take        CONTINUE taking these medications    albuterol 90 mcg/actuation inhaler  Inhale 1-2 puffs into the lungs every 6 (six) hours as needed for Wheezing. Rescue     albuterol-ipratropium 2.5 mg-0.5 mg/3 mL nebulizer solution  Commonly known as:  DUO-NEB  Take 3 mLs by nebulization every 4 (four) hours. Rescue     baclofen 20 MG tablet  Commonly known as:  LIORESAL  Take 1 tablet (20 mg total) by mouth 3 (three) times daily.     benzonatate 100 MG capsule  Commonly known as:  TESSALON  Take 1 capsule (100 mg total) by mouth 3 (three) times daily as needed for Cough.     buPROPion 100 MG TBSR 12 hr tablet  Commonly known as:  WELLBUTRIN SR  Take 1 tablet (100 mg total) by mouth 2 (two) times daily.     carvedilol 12.5 MG tablet  Commonly known as:  COREG  Take 1 tablet (12.5 mg total) by mouth 2 (two) times daily with meals.     dextromethorphan-guaifenesin  mg  mg per 12 hr tablet  Commonly known as:  MUCINEX DM  Take 1 tablet by mouth 2 (two) times daily.     famotidine 20 MG tablet  Commonly known as:  PEPCID  Take 1 tablet (20 mg total) by mouth once daily.     gabapentin 300 MG capsule  Commonly known as:  NEURONTIN  Take 1 capsule (300 mg total) by mouth every evening.     HYDROcodone-acetaminophen 5-325 mg per tablet  Commonly known as:  NORCO  Take 1 tablet by mouth every 6 (six) hours as needed for Pain.     oxybutynin 5 MG Tab  Commonly known as:  DITROPAN  Take 1 tablet (5 mg total) by mouth 2 (two) times daily.        STOP taking these medications    methylPREDNISolone 4 mg tablet  Commonly known as:  MEDROL DOSEPACK     pantoprazole 40 MG  tablet  Commonly known as:  PROTONIX            I certify that this patient is confined to her home and needs intermittent skilled nursing care, physical therapy and occupational therapy.

## 2018-06-14 NOTE — ASSESSMENT & PLAN NOTE
Based on HgbA1c being high on reported home insulin and stable Glucose in Hospital, concerns for noncompliance.  In addition, It appears she doesn't show for appointments/does not regularly follow up with primary care physician. The importance of this was stressed to the daughter who will be helping take care of the patient.   She needs f/u on COPD, CHF, Pulmonary Nodule, and spot uterine bleeding. Patient and daughter instructed on importance.

## 2018-06-14 NOTE — ASSESSMENT & PLAN NOTE
-Admitted to OSH for asthma exacerbation.    - continue duonebs   - Patient with minimal cough prior to discharge.

## 2018-06-14 NOTE — ASSESSMENT & PLAN NOTE
-Lasix Changed to PO,   -CXR stable, no acute change in O2 needs   Sat 88-92 on room air  Discussed the importance of monitor for fluid overload, Swollen ankles increasing Weight, SOb with daughter who will be helping take care of patient.   We also discussed the importance of being complaint with lasix.

## 2018-06-14 NOTE — PROGRESS NOTES
Food & Nutrition  Education    Diet Education: Diabetic Diet  Time Spent: 15 minutes  Learners: patient, daughter      Nutrition Education provided with handouts: Diabetic diet, Plate Method Handout      Comments: RD educated pt's dtgr on diabetic diet. Topics included: types of carbohydrates, serving sizes and building a plate. No [place] education handouts available at time of education however pt's dtgr verbalized understanding. Dtgr reports pt will be eating foods cooked by herself and her dtgr. Handout left with dtgr. All questions and concerns answered. Dietitian's contact information provided.       Follow-Up: As previously scheduled by YORDY    Please Re-consult as needed    Thanks!  YORDY Jensen, LDN  [phone]

## 2018-06-14 NOTE — ASSESSMENT & PLAN NOTE
"  Per H&P - " 1 month of vaginal bleeding. She has a gynecologist appt on June 20. She reports that she previously had uterine cancer of some sort. "   H&H remains stable   Bleeding is reported as occasional spotting per Nurse. No blood noted when heling patient from bed previous day.   It is important for her to have follow-up.      "

## 2018-06-14 NOTE — ASSESSMENT & PLAN NOTE
- Stroke risk factor   - A1c 10.5%  - Her BS were better controlled on 4 units of Aspart with meals. 3 units of detemir before bedtime was added prior to discharge.   - diabetic education was included in her home health orders.

## 2018-06-14 NOTE — PROGRESS NOTES
Pt ambulated approximately 150 ft in hallway on room air. O2 saturation remained at/ >88% during ambulation. RAHEEL Menendez aware. Will continue to monitor.

## 2018-06-14 NOTE — PLAN OF CARE
WILBERTO advised Pt will be leaving today for home and needs several things set up. WILBERTO contacted [facility] and spoke with Blanca regarding setting up HH. Sent referral via  and will await if they can take the Pt per insurance.     WILBERTO received call from Martine with [facility] reporting the Pt is [facility] and cannot have HH orders from us. [facility] has to write the orders and set up HH.     WILBERTO contacted [facility] ([phone]) and spoke with Pt MD RN. She reported MD would write the orders and their CM department will set up the HH. WILBERTO sent Facesheet and DC information to fax: [phone].     Maryse Ramey LMSW  Neurocritical Care   [facility]  [id number]

## 2018-06-14 NOTE — ASSESSMENT & PLAN NOTE
--SSI while npo  --a1c 10  -starting scheduled insulin TID WM now that diet starting.   -Would question home compliance with diet or insulin as requirements in Hospital minimal  -Discussed importance of being compliant with diet and insulin with patient/family

## 2018-06-14 NOTE — ASSESSMENT & PLAN NOTE
--continue duonebs and add ICS if needed  --sputum production less today, comfortable on home o2 N  --O2 st 88-92 on room air, Nursing performed walk test and stated O2 sat never decreased below 88%  --Discussed Home O2 with patient, Vascular Neurology team. Unsure whether patient actually has home O2, but based on walk test, Vascular neurology comfortable discharging patient without O2.

## 2018-06-14 NOTE — PLAN OF CARE
06/14/18 1439   Final Note   Assessment Type Final Discharge Note   Discharge Disposition Home-Health   What phone number can be called within the next 1-3 days to see how you are doing after discharge? [phone]   Hospital Follow Up  Appt(s) scheduled? Yes   Discharge plans and expectations educations in teach back method with documentation complete? Yes   Right Care Referral Info   Post Acute Recommendation Home-care   Referral Type [facility] to schedule Home Health for patient       Follow-up Information     Rom Jacques MD On 6/19/2018.    Specialty:  Internal Medicine  Why:   Hospital follow up:  [address] [place] [address], [phone] at 9:40 am   Contact information:  [address] [place] [id number] [phone]                   Kusum Mehta RN, CCRN-K, [facility]  Neuro-Critical Care   [phone]

## 2018-06-22 NOTE — PT/OT/SLP DISCHARGE
Physical Therapy Discharge Summary    Name: Franklin Christian  MRN: [id number]   Principal Problem: Embolic stroke involving left middle cerebral artery     Patient Discharged from acute Physical Therapy on 6/14/2018.  Please refer to prior PT noted date on 6/13/2018 for functional status.     Assessment:     Patient appropriate for care in another setting.    Objective:     GOALS:    Physical Therapy Goals        Problem: Physical Therapy Goal    Goal Priority Disciplines Outcome Goal Variances Interventions   Physical Therapy Goal     PT/OT, PT Ongoing (interventions implemented as appropriate)     Description:    Goals to be met by 6/22/2018    1. Pt will perform supine to sit from both sides of the bed with SBA.  2. Pt will perform sit to supine with SBA.  3. Pt will stand x 5 minutes with CGA and no LOB while performing dynamic UE tasks to prepare for functional tasks in standing  4. Pt will perform sit to stand transfers with CGA.   MET 6/13/2018  5. Pt will perform bed <> chair transfers with SBA.   MET 6/13/2018  6. Pt will perform gait x 50 feet with SBA using appropriate AD.   MET 6/13/2018  7. Pt will ascend and descend 2 flights of stairs with hand rail in order to access her dtr's home (desired d/c destination)                       Reasons for Discontinuation of Therapy Services  Transfer to alternate level of care.      Plan:     Patient Discharged to: Home with Home Health Service.    Feli Lizama, PT  6/22/2018

## 2018-09-26 ENCOUNTER — HOSPITAL ENCOUNTER (EMERGENCY)
Facility: HOSPITAL | Age: 73
Discharge: HOME | End: 2018-09-27
Attending: EMERGENCY MEDICINE
Payer: MEDICARE

## 2018-09-26 DIAGNOSIS — D25.9 UTERINE LEIOMYOMA, UNSPECIFIED LOCATION: ICD-10-CM

## 2018-09-26 DIAGNOSIS — R10.2 SUPRAPUBIC PAIN: Primary | ICD-10-CM

## 2018-09-26 DIAGNOSIS — R10.9 ABDOMINAL PAIN: ICD-10-CM

## 2018-09-26 LAB
ALBUMIN SERPL BCP-MCNC: 3.8 G/DL
ALP SERPL-CCNC: 80 U/L
ALT SERPL W/O P-5'-P-CCNC: 19 U/L
ANION GAP SERPL CALC-SCNC: 12 MMOL/L
APTT BLDCRRT: 23.8 SEC
AST SERPL-CCNC: 22 U/L
BACTERIA #/AREA URNS HPF: NORMAL /HPF
BASOPHILS # BLD AUTO: 0.01 K/UL
BASOPHILS NFR BLD: 0.2 %
BILIRUB SERPL-MCNC: 0.8 MG/DL
BILIRUB UR QL STRIP: NEGATIVE
BUN SERPL-MCNC: 23 MG/DL
CALCIUM SERPL-MCNC: 9.7 MG/DL
CHLORIDE SERPL-SCNC: 107 MMOL/L
CLARITY UR: CLEAR
CO2 SERPL-SCNC: 25 MMOL/L
COLOR UR: YELLOW
CREAT SERPL-MCNC: 1.3 MG/DL
DIFFERENTIAL METHOD: ABNORMAL
EOSINOPHIL # BLD AUTO: 0.1 K/UL
EOSINOPHIL NFR BLD: 1.3 %
ERYTHROCYTE [DISTWIDTH] IN BLOOD BY AUTOMATED COUNT: 14.6 %
EST. GFR  (AFRICAN AMERICAN): 48 ML/MIN/1.73 M^2
EST. GFR  (NON AFRICAN AMERICAN): 42 ML/MIN/1.73 M^2
GLUCOSE SERPL-MCNC: 147 MG/DL
GLUCOSE UR QL STRIP: NEGATIVE
HCT VFR BLD AUTO: 36.5 %
HGB BLD-MCNC: 11.6 G/DL
HGB UR QL STRIP: ABNORMAL
HYALINE CASTS #/AREA URNS LPF: 0 /LPF
INR PPP: 1
KETONES UR QL STRIP: NEGATIVE
LACTATE SERPL-SCNC: 0.7 MMOL/L
LEUKOCYTE ESTERASE UR QL STRIP: NEGATIVE
LYMPHOCYTES # BLD AUTO: 1.1 K/UL
LYMPHOCYTES NFR BLD: 18.5 %
MCH RBC QN AUTO: 29.6 PG
MCHC RBC AUTO-ENTMCNC: 31.8 G/DL
MCV RBC AUTO: 93 FL
MICROSCOPIC COMMENT: NORMAL
MONOCYTES # BLD AUTO: 0.4 K/UL
MONOCYTES NFR BLD: 6.8 %
NEUTROPHILS # BLD AUTO: 4.4 K/UL
NEUTROPHILS NFR BLD: 73 %
NITRITE UR QL STRIP: NEGATIVE
PH UR STRIP: 6 [PH] (ref 5–8)
PLATELET # BLD AUTO: 227 K/UL
PMV BLD AUTO: 11.5 FL
POCT GLUCOSE: 135 MG/DL (ref 70–110)
POTASSIUM SERPL-SCNC: 4.1 MMOL/L
PROT SERPL-MCNC: 6.9 G/DL
PROT UR QL STRIP: ABNORMAL
PROTHROMBIN TIME: 10.7 SEC
RBC # BLD AUTO: 3.92 M/UL
RBC #/AREA URNS HPF: 4 /HPF (ref 0–4)
SODIUM SERPL-SCNC: 144 MMOL/L
SP GR UR STRIP: 1.02 (ref 1–1.03)
SQUAMOUS #/AREA URNS HPF: 2 /HPF
URN SPEC COLLECT METH UR: ABNORMAL
UROBILINOGEN UR STRIP-ACNC: NEGATIVE EU/DL
WBC # BLD AUTO: 6.04 K/UL
WBC #/AREA URNS HPF: 2 /HPF (ref 0–5)

## 2018-09-26 PROCEDURE — 85730 THROMBOPLASTIN TIME PARTIAL: CPT

## 2018-09-26 PROCEDURE — 81000 URINALYSIS NONAUTO W/SCOPE: CPT

## 2018-09-26 PROCEDURE — 80053 COMPREHEN METABOLIC PANEL: CPT

## 2018-09-26 PROCEDURE — 85610 PROTHROMBIN TIME: CPT

## 2018-09-26 PROCEDURE — 83605 ASSAY OF LACTIC ACID: CPT

## 2018-09-26 PROCEDURE — 82962 GLUCOSE BLOOD TEST: CPT

## 2018-09-26 PROCEDURE — 85025 COMPLETE CBC W/AUTO DIFF WBC: CPT

## 2018-09-26 PROCEDURE — 99285 EMERGENCY DEPT VISIT HI MDM: CPT | Mod: 25

## 2018-09-27 VITALS
WEIGHT: 245 LBS | BODY MASS INDEX: 48.1 KG/M2 | HEIGHT: 60 IN | RESPIRATION RATE: 20 BRPM | SYSTOLIC BLOOD PRESSURE: 180 MMHG | DIASTOLIC BLOOD PRESSURE: 82 MMHG | TEMPERATURE: 98 F | OXYGEN SATURATION: 97 % | HEART RATE: 78 BPM

## 2018-09-27 PROCEDURE — 25500020 PHARM REV CODE 255: Performed by: EMERGENCY MEDICINE

## 2018-09-27 RX ADMIN — IOHEXOL 100 ML: 350 INJECTION, SOLUTION INTRAVENOUS at 12:09

## 2018-09-27 NOTE — ED NOTES
Pt placed omn bedpan for urine; pt remains with abd pain and shortness of breath; no obvious sign of any bleeding on exam

## 2018-09-27 NOTE — ED PROVIDER NOTES
Encounter Date: 9/26/2018    SCRIBE #1 NOTE: I, Devon Aguero, am scribing for, and in the presence of,  Dr. Scruggs. I have scribed the entire note.       History     Chief Complaint   Patient presents with    Abdominal Pain     To ER per EMS with c/o abd pain and vaginal bleeding.  pt is being treated for cervical cancer at this time.    Vaginal Bleeding     This is a 72 y.o. female who has a past medical history of Diabetes mellitus, Hypertension, MI, and Renal disorder who presents via EMS with chief complaint of intermittent lower abdominal pain and vaginal bleeding beginning today. She also reports dysuria, and chronic leg and back pain. Patient denies any current pain, nausea, vomiting, or any other concerning symptoms. She was diagnosed with cervical cancer 6 months ago, but has had any treatment yet; she does plan to start treatment soon. Family also reports patient has a pacemaker in place.          Review of patient's allergies indicates:  No Known Allergies  Past Medical History:   Diagnosis Date    Diabetes mellitus     Hypertension     MI, old     Renal disorder      Past Surgical History:   Procedure Laterality Date    CARDIAC PACEMAKER PLACEMENT      CARDIAC PACEMAKER PLACEMENT      HYSTERECTOMY      SHOULDER SURGERY       No family history on file.  Social History     Tobacco Use    Smoking status: Former Smoker    Smokeless tobacco: Never Used   Substance Use Topics    Alcohol use: No    Drug use: No     Review of Systems   Constitutional: Negative for chills and fever.   HENT: Negative for facial swelling and trouble swallowing.    Eyes: Negative for redness.   Respiratory: Negative for shortness of breath.    Cardiovascular: Negative for chest pain.   Gastrointestinal: Positive for abdominal pain (lower). Negative for diarrhea and vomiting.   Genitourinary: Positive for dysuria, pelvic pain and vaginal bleeding. Negative for hematuria.   Musculoskeletal: Positive for back pain  (chronic) and myalgias (chronic leg pain). Negative for gait problem.   Skin: Negative for rash.   Neurological: Negative for facial asymmetry and speech difficulty.     Physical Exam     Initial Vitals [09/26/18 2201]   BP Pulse Resp Temp SpO2   (!) 196/89 79 18 98.1 °F (36.7 °C) 95 %      MAP       --         Physical Exam    Nursing note and vitals reviewed.  Constitutional: She appears well-developed and well-nourished. She is not diaphoretic. No distress.   HENT:   Head: Normocephalic and atraumatic.   Eyes: Conjunctivae and EOM are normal.   Neck: Normal range of motion. Neck supple.   Cardiovascular: Normal rate, regular rhythm and normal heart sounds.   Pulmonary/Chest: Breath sounds normal. No respiratory distress.   Abdominal: Soft. Bowel sounds are normal. There is tenderness.   Suprapubic TTP, diffuse abdominal TTP  Normal bowel sounds in all 4 quadrants   Genitourinary:   Genitourinary Comments: Vaginal exam: no obvious signs of bleeding or trauma; no abnormal lesions   Musculoskeletal: Normal range of motion. She exhibits no edema or tenderness.   Neurological: She is alert and oriented to person, place, and time. She has normal strength.   Skin: Skin is warm and dry. Capillary refill takes less than 2 seconds.       ED Course   Procedures  Labs Reviewed   CBC W/ AUTO DIFFERENTIAL - Abnormal; Notable for the following components:       Result Value    RBC 3.92 (*)     Hemoglobin 11.6 (*)     Hematocrit 36.5 (*)     MCHC 31.8 (*)     RDW 14.6 (*)     All other components within normal limits   COMPREHENSIVE METABOLIC PANEL - Abnormal; Notable for the following components:    Glucose 147 (*)     eGFR if  48 (*)     eGFR if non  42 (*)     All other components within normal limits   URINALYSIS, REFLEX TO URINE CULTURE - Abnormal; Notable for the following components:    Protein, UA 1+ (*)     Occult Blood UA 1+ (*)     All other components within normal limits    Narrative:      Preferred Collection Type->Urine, Clean Catch   POCT GLUCOSE - Abnormal; Notable for the following components:    POCT Glucose 135 (*)     All other components within normal limits   LACTIC ACID, PLASMA   APTT   PROTIME-INR   URINALYSIS MICROSCOPIC    Narrative:     Preferred Collection Type->Urine, Clean Catch          X-Rays:   Independently Interpreted Readings:   Other Readings:  Reviewed by myself, read by radiology.    Imaging Results          X-Ray Chest 1 View (Final result)  Result time 09/27/18 00:48:03   Procedure changed from X-Ray Chest PA And Lateral     Final result by Reta Vargas MD (09/27/18 00:48:03)                 Impression:      See above.      Electronically signed by: Reta Vargas MD  Date:    09/27/2018  Time:    00:48             Narrative:    EXAMINATION:  XR CHEST 1 VIEW    CLINICAL HISTORY:  chest pain; Unspecified abdominal pain    TECHNIQUE:  Single frontal view of the chest was performed.    COMPARISON:  06/13/2018.    FINDINGS:  Left-sided pacer device is seen.  Heart is stable in size.  There is prominence of central pulmonary vasculature with increased interstitial attenuation which may reflect mild pulmonary vascular congestion with edema.  Linear opacities are seen within the left mid to lower lung zone suggestive for mild atelectasis.  No evidence of pneumothorax or significant effusion.  No acute osseous abnormality identified.                               CT Abdomen Pelvis With Contrast (Final result)  Result time 09/27/18 00:29:39    Final result by Reta Vargas MD (09/27/18 00:29:39)                 Impression:      1. No acute intra-abdominal abnormalities identified.  2. Indeterminate 1.7 cm right renal hypodense lesion.  Although findings could represent possible complex cyst, recommend future ultrasound follow-up to assess for cystic nature of this lesion and exclude potential solid mass.  3. Hepatosplenomegaly.  4. Prominent lobular uterus suggestive  for fibroids.  5. Additional findings as detailed above.      Electronically signed by: Reta Vargas MD  Date:    09/27/2018  Time:    00:29             Narrative:    EXAMINATION:  CT ABDOMEN PELVIS WITH CONTRAST    CLINICAL HISTORY:  Abdominal distension;abdominal pain; hx of cervical cancer;    TECHNIQUE:  Low dose axial images, sagittal and coronal reformations were obtained from the lung bases to the pubic symphysis following the IV administration of 100 mL of Omnipaque 350 .  Oral contrast was not given.    COMPARISON:  CT abdomen and pelvis from February 2012.    FINDINGS:  The visualized portion of the heart is unremarkable with partially visualized pacer wire seen.  Mild atelectatic changes are seen at the lung bases with trace right pleural effusion.    Liver is enlarged measuring 20 cm.  Spleen is enlarged measuring 15 cm.  There is no intra-or extrahepatic biliary ductal dilatation.  The gallbladder is unremarkable.  The stomach, pancreas, and adrenal glands are unremarkable.    Kidneys are functioning with no evidence of hydronephrosis.  Small bilateral renal hypodensities are visualized, probable cyst with additional 1.7 cm hypodensity seen on the right which measures higher than fluid density.  No abnormalities are seen along the ureteral courses.  Urinary bladder is nondistended.  Uterus is prominent and lobular in contour suggestive for fibroids, similar to previous exam.    Appendix is visualized and is unremarkable.  The visualized loops of small and large bowel show no evidence of obstruction or inflammation.  No free air or free fluid.    Aorta tapers normally with prominent atherosclerosis.    No acute osseous abnormality identified.  Degenerative changes are seen in the spine.  Subcutaneous soft tissue structures are unremarkable.                              Medical Decision Making:   Clinical Tests:   Lab Tests: Ordered and Reviewed  Radiological Study: Ordered and Reviewed  ED  Management:  - CBC w/diff WNL  - CMP WNL  - Lipase WNL  - Lactic acid WNL  - UA WNL  - CT abd/pelvis notable for uterine fibroids; no other active intra-abdominal abnormality appreciated on final read  - CXR without acute cardiopulmonary process  - Vaginal/pelvic exam unremarkable  - Pt no longer complaining of pain or bleeding; hemodynamically stable throughout stay  - Pt instructed to follow up with OB-GYN in one week as she has reported hx of cervical cancer and fibroids that are not being managed  Results of all emergency department tests  discussed thoroughly with patient; all patient questions answered  - Pt instructed to follow up with PCP in one week for recheck of today's complaints  - Pt given strict emergency department return precautions for any new or worsening of symptoms  - Pt discharged from the emergency department in stable condition                       Clinical Impression:     1. Suprapubic pain    2. Abdominal pain    3. Uterine leiomyoma, unspecified location        Disposition:   Disposition: Discharged  Condition: Stable    I, Kendall Scruggs,  personally performed the services described in this documentation. All medical record entries made by the scribe were at my direction and in my presence.  I have reviewed the chart and agree that the record reflects my personal performance and is accurate and complete. Kendall Scruggs M.D. 1:46 AM09/27/2018       Kendall Scruggs MD  09/27/18 0147

## 2018-09-27 NOTE — ED NOTES
Vag exam done; no active bleeding observed per md; pt cleared for discharge to home; no further orders agiven

## 2018-09-27 NOTE — ED NOTES
Instructions to pt and family; pt discharged home per whch with family; no rx given; no abd pain on discharge; urinating w/o difficulty; no n/v/d or fever reported ; pt has a referral for follow up care

## 2019-01-20 ENCOUNTER — HOSPITAL ENCOUNTER (INPATIENT)
Facility: HOSPITAL | Age: 74
LOS: 5 days | Discharge: NURSING, REHAB OR LONG-TERM CARE | DRG: 280 | End: 2019-01-25
Attending: EMERGENCY MEDICINE | Admitting: INTERNAL MEDICINE
Payer: MEDICARE

## 2019-01-20 DIAGNOSIS — Z86.73 HISTORY OF STROKE: ICD-10-CM

## 2019-01-20 DIAGNOSIS — I10 UNCONTROLLED HYPERTENSION: ICD-10-CM

## 2019-01-20 DIAGNOSIS — E78.5 HYPERLIPIDEMIA ASSOCIATED WITH TYPE 2 DIABETES MELLITUS: ICD-10-CM

## 2019-01-20 DIAGNOSIS — R53.81 DEBILITY: ICD-10-CM

## 2019-01-20 DIAGNOSIS — J96.21 ACUTE ON CHRONIC RESPIRATORY FAILURE WITH HYPOXIA AND HYPERCAPNIA: Primary | ICD-10-CM

## 2019-01-20 DIAGNOSIS — R07.9 CHEST PAIN, UNSPECIFIED TYPE: ICD-10-CM

## 2019-01-20 DIAGNOSIS — E11.69 HYPERLIPIDEMIA ASSOCIATED WITH TYPE 2 DIABETES MELLITUS: ICD-10-CM

## 2019-01-20 DIAGNOSIS — I21.4 NSTEMI (NON-ST ELEVATED MYOCARDIAL INFARCTION): ICD-10-CM

## 2019-01-20 DIAGNOSIS — R06.02 SHORTNESS OF BREATH: ICD-10-CM

## 2019-01-20 DIAGNOSIS — E11.22 TYPE 2 DIABETES MELLITUS WITH STAGE 3 CHRONIC KIDNEY DISEASE, WITH LONG-TERM CURRENT USE OF INSULIN: ICD-10-CM

## 2019-01-20 DIAGNOSIS — I27.20 PULMONARY HYPERTENSION: ICD-10-CM

## 2019-01-20 DIAGNOSIS — I50.33 ACUTE ON CHRONIC DIASTOLIC CONGESTIVE HEART FAILURE: ICD-10-CM

## 2019-01-20 DIAGNOSIS — Z91.148 NONCOMPLIANCE WITH MEDICATION REGIMEN: ICD-10-CM

## 2019-01-20 DIAGNOSIS — R06.02 SOB (SHORTNESS OF BREATH): ICD-10-CM

## 2019-01-20 DIAGNOSIS — J96.22 ACUTE ON CHRONIC RESPIRATORY FAILURE WITH HYPOXIA AND HYPERCAPNIA: Primary | ICD-10-CM

## 2019-01-20 DIAGNOSIS — E66.01 MORBID OBESITY: ICD-10-CM

## 2019-01-20 DIAGNOSIS — R53.81 PHYSICAL DECONDITIONING: ICD-10-CM

## 2019-01-20 DIAGNOSIS — Z79.4 TYPE 2 DIABETES MELLITUS WITH STAGE 3 CHRONIC KIDNEY DISEASE, WITH LONG-TERM CURRENT USE OF INSULIN: ICD-10-CM

## 2019-01-20 DIAGNOSIS — N18.30 TYPE 2 DIABETES MELLITUS WITH STAGE 3 CHRONIC KIDNEY DISEASE, WITH LONG-TERM CURRENT USE OF INSULIN: ICD-10-CM

## 2019-01-20 DIAGNOSIS — I50.9 ACUTE CONGESTIVE HEART FAILURE: ICD-10-CM

## 2019-01-20 DIAGNOSIS — R79.89 ELEVATED TROPONIN: ICD-10-CM

## 2019-01-20 LAB
ALBUMIN SERPL BCP-MCNC: 3.8 G/DL
ALLENS TEST: ABNORMAL
ALP SERPL-CCNC: 127 U/L
ALT SERPL W/O P-5'-P-CCNC: 38 U/L
ANION GAP SERPL CALC-SCNC: 9 MMOL/L
AST SERPL-CCNC: 23 U/L
BASOPHILS # BLD AUTO: 0.01 K/UL
BASOPHILS NFR BLD: 0.2 %
BILIRUB SERPL-MCNC: 0.9 MG/DL
BNP SERPL-MCNC: 2032 PG/ML
BUN SERPL-MCNC: 28 MG/DL
CALCIUM SERPL-MCNC: 9 MG/DL
CHLORIDE SERPL-SCNC: 108 MMOL/L
CO2 SERPL-SCNC: 25 MMOL/L
CREAT SERPL-MCNC: 1.3 MG/DL
DELSYS: ABNORMAL
DIFFERENTIAL METHOD: ABNORMAL
EOSINOPHIL # BLD AUTO: 0.1 K/UL
EOSINOPHIL NFR BLD: 1.4 %
EP: 5
ERYTHROCYTE [DISTWIDTH] IN BLOOD BY AUTOMATED COUNT: 16.9 %
EST. GFR  (AFRICAN AMERICAN): 48 ML/MIN/1.73 M^2
EST. GFR  (NON AFRICAN AMERICAN): 42 ML/MIN/1.73 M^2
FIO2: 40
GLUCOSE SERPL-MCNC: 195 MG/DL
HCO3 UR-SCNC: 28 MMOL/L (ref 24–28)
HCT VFR BLD AUTO: 39.9 %
HGB BLD-MCNC: 12.2 G/DL
IP: 10
LYMPHOCYTES # BLD AUTO: 0.8 K/UL
LYMPHOCYTES NFR BLD: 13.1 %
MCH RBC QN AUTO: 29 PG
MCHC RBC AUTO-ENTMCNC: 30.6 G/DL
MCV RBC AUTO: 95 FL
MODE: ABNORMAL
MONOCYTES # BLD AUTO: 0.4 K/UL
MONOCYTES NFR BLD: 6.6 %
NEUTROPHILS # BLD AUTO: 4.6 K/UL
NEUTROPHILS NFR BLD: 78.4 %
PCO2 BLDA: 53.6 MMHG (ref 35–45)
PH SMN: 7.33 [PH] (ref 7.35–7.45)
PLATELET # BLD AUTO: 198 K/UL
PMV BLD AUTO: 11.7 FL
PO2 BLDA: 156 MMHG (ref 80–100)
POC BE: 2 MMOL/L
POC SATURATED O2: 99 % (ref 95–100)
POC TCO2: 30 MMOL/L (ref 23–27)
POCT GLUCOSE: 172 MG/DL (ref 70–110)
POTASSIUM SERPL-SCNC: 4.8 MMOL/L
PROT SERPL-MCNC: 6.8 G/DL
RBC # BLD AUTO: 4.21 M/UL
SAMPLE: ABNORMAL
SITE: ABNORMAL
SODIUM SERPL-SCNC: 142 MMOL/L
TROPONIN I SERPL DL<=0.01 NG/ML-MCNC: 0.03 NG/ML
WBC # BLD AUTO: 5.9 K/UL

## 2019-01-20 PROCEDURE — 85025 COMPLETE CBC W/AUTO DIFF WBC: CPT

## 2019-01-20 PROCEDURE — 25000003 PHARM REV CODE 250: Performed by: STUDENT IN AN ORGANIZED HEALTH CARE EDUCATION/TRAINING PROGRAM

## 2019-01-20 PROCEDURE — 84484 ASSAY OF TROPONIN QUANT: CPT | Mod: 91

## 2019-01-20 PROCEDURE — 82803 BLOOD GASES ANY COMBINATION: CPT

## 2019-01-20 PROCEDURE — 84484 ASSAY OF TROPONIN QUANT: CPT

## 2019-01-20 PROCEDURE — 99900035 HC TECH TIME PER 15 MIN (STAT)

## 2019-01-20 PROCEDURE — 12000002 HC ACUTE/MED SURGE SEMI-PRIVATE ROOM

## 2019-01-20 PROCEDURE — 80061 LIPID PANEL: CPT

## 2019-01-20 PROCEDURE — 80053 COMPREHEN METABOLIC PANEL: CPT

## 2019-01-20 PROCEDURE — 96374 THER/PROPH/DIAG INJ IV PUSH: CPT

## 2019-01-20 PROCEDURE — 82962 GLUCOSE BLOOD TEST: CPT

## 2019-01-20 PROCEDURE — 63600175 PHARM REV CODE 636 W HCPCS: Performed by: STUDENT IN AN ORGANIZED HEALTH CARE EDUCATION/TRAINING PROGRAM

## 2019-01-20 PROCEDURE — 25000003 PHARM REV CODE 250: Performed by: EMERGENCY MEDICINE

## 2019-01-20 PROCEDURE — 99285 EMERGENCY DEPT VISIT HI MDM: CPT | Mod: 25

## 2019-01-20 PROCEDURE — 36415 COLL VENOUS BLD VENIPUNCTURE: CPT

## 2019-01-20 PROCEDURE — 83036 HEMOGLOBIN GLYCOSYLATED A1C: CPT

## 2019-01-20 PROCEDURE — 94660 CPAP INITIATION&MGMT: CPT

## 2019-01-20 PROCEDURE — 93005 ELECTROCARDIOGRAM TRACING: CPT

## 2019-01-20 PROCEDURE — 83880 ASSAY OF NATRIURETIC PEPTIDE: CPT

## 2019-01-20 PROCEDURE — 27000190 HC CPAP FULL FACE MASK W/VALVE

## 2019-01-20 PROCEDURE — A4216 STERILE WATER/SALINE, 10 ML: HCPCS | Performed by: STUDENT IN AN ORGANIZED HEALTH CARE EDUCATION/TRAINING PROGRAM

## 2019-01-20 PROCEDURE — 63600175 PHARM REV CODE 636 W HCPCS: Performed by: EMERGENCY MEDICINE

## 2019-01-20 PROCEDURE — 36600 WITHDRAWAL OF ARTERIAL BLOOD: CPT

## 2019-01-20 RX ORDER — IBUPROFEN 200 MG
16 TABLET ORAL
Status: DISCONTINUED | OUTPATIENT
Start: 2019-01-20 | End: 2019-01-25 | Stop reason: HOSPADM

## 2019-01-20 RX ORDER — HYDROCODONE BITARTRATE AND ACETAMINOPHEN 5; 325 MG/1; MG/1
1 TABLET ORAL EVERY 6 HOURS PRN
Status: DISCONTINUED | OUTPATIENT
Start: 2019-01-20 | End: 2019-01-25 | Stop reason: HOSPADM

## 2019-01-20 RX ORDER — FUROSEMIDE 10 MG/ML
80 INJECTION INTRAMUSCULAR; INTRAVENOUS EVERY 8 HOURS
Status: COMPLETED | OUTPATIENT
Start: 2019-01-21 | End: 2019-01-21

## 2019-01-20 RX ORDER — NAPROXEN SODIUM 220 MG/1
81 TABLET, FILM COATED ORAL DAILY
Status: DISCONTINUED | OUTPATIENT
Start: 2019-01-21 | End: 2019-01-25 | Stop reason: HOSPADM

## 2019-01-20 RX ORDER — HEPARIN SODIUM 5000 [USP'U]/ML
7500 INJECTION, SOLUTION INTRAVENOUS; SUBCUTANEOUS EVERY 8 HOURS
Status: DISCONTINUED | OUTPATIENT
Start: 2019-01-20 | End: 2019-01-25 | Stop reason: HOSPADM

## 2019-01-20 RX ORDER — IBUPROFEN 200 MG
24 TABLET ORAL
Status: DISCONTINUED | OUTPATIENT
Start: 2019-01-20 | End: 2019-01-25 | Stop reason: HOSPADM

## 2019-01-20 RX ORDER — HYDRALAZINE HYDROCHLORIDE 25 MG/1
25 TABLET, FILM COATED ORAL EVERY 4 HOURS PRN
Status: DISCONTINUED | OUTPATIENT
Start: 2019-01-20 | End: 2019-01-25 | Stop reason: HOSPADM

## 2019-01-20 RX ORDER — POLYETHYLENE GLYCOL 3350 17 G/17G
17 POWDER, FOR SOLUTION ORAL DAILY
Status: DISCONTINUED | OUTPATIENT
Start: 2019-01-21 | End: 2019-01-25 | Stop reason: HOSPADM

## 2019-01-20 RX ORDER — FUROSEMIDE 10 MG/ML
80 INJECTION INTRAMUSCULAR; INTRAVENOUS
Status: COMPLETED | OUTPATIENT
Start: 2019-01-20 | End: 2019-01-20

## 2019-01-20 RX ORDER — SODIUM CHLORIDE 0.9 % (FLUSH) 0.9 %
3 SYRINGE (ML) INJECTION EVERY 8 HOURS
Status: DISCONTINUED | OUTPATIENT
Start: 2019-01-20 | End: 2019-01-25 | Stop reason: HOSPADM

## 2019-01-20 RX ORDER — ONDANSETRON 8 MG/1
8 TABLET, ORALLY DISINTEGRATING ORAL EVERY 8 HOURS PRN
Status: DISCONTINUED | OUTPATIENT
Start: 2019-01-20 | End: 2019-01-25 | Stop reason: HOSPADM

## 2019-01-20 RX ORDER — GLUCAGON 1 MG
1 KIT INJECTION
Status: DISCONTINUED | OUTPATIENT
Start: 2019-01-20 | End: 2019-01-25 | Stop reason: HOSPADM

## 2019-01-20 RX ORDER — CARVEDILOL 12.5 MG/1
12.5 TABLET ORAL 2 TIMES DAILY
Status: DISCONTINUED | OUTPATIENT
Start: 2019-01-20 | End: 2019-01-20

## 2019-01-20 RX ORDER — ASPIRIN 325 MG
325 TABLET ORAL
Status: COMPLETED | OUTPATIENT
Start: 2019-01-20 | End: 2019-01-20

## 2019-01-20 RX ORDER — ONDANSETRON 4 MG/1
4 TABLET, ORALLY DISINTEGRATING ORAL
Status: COMPLETED | OUTPATIENT
Start: 2019-01-20 | End: 2019-01-20

## 2019-01-20 RX ORDER — NITROGLYCERIN 0.4 MG/1
0.4 TABLET SUBLINGUAL
Status: COMPLETED | OUTPATIENT
Start: 2019-01-20 | End: 2019-01-20

## 2019-01-20 RX ORDER — CARVEDILOL 12.5 MG/1
12.5 TABLET ORAL
Status: COMPLETED | OUTPATIENT
Start: 2019-01-20 | End: 2019-01-20

## 2019-01-20 RX ORDER — ATORVASTATIN CALCIUM 40 MG/1
40 TABLET, FILM COATED ORAL DAILY
Status: DISCONTINUED | OUTPATIENT
Start: 2019-01-21 | End: 2019-01-25 | Stop reason: HOSPADM

## 2019-01-20 RX ORDER — CARVEDILOL 25 MG/1
25 TABLET ORAL 2 TIMES DAILY WITH MEALS
Status: DISCONTINUED | OUTPATIENT
Start: 2019-01-21 | End: 2019-01-25 | Stop reason: HOSPADM

## 2019-01-20 RX ORDER — INSULIN ASPART 100 [IU]/ML
0-5 INJECTION, SOLUTION INTRAVENOUS; SUBCUTANEOUS
Status: DISCONTINUED | OUTPATIENT
Start: 2019-01-20 | End: 2019-01-25 | Stop reason: HOSPADM

## 2019-01-20 RX ADMIN — HEPARIN SODIUM 7500 UNITS: 5000 INJECTION, SOLUTION INTRAVENOUS; SUBCUTANEOUS at 11:01

## 2019-01-20 RX ADMIN — CARVEDILOL 12.5 MG: 12.5 TABLET, FILM COATED ORAL at 07:01

## 2019-01-20 RX ADMIN — Medication 3 ML: at 11:01

## 2019-01-20 RX ADMIN — FUROSEMIDE 80 MG: 10 INJECTION, SOLUTION INTRAMUSCULAR; INTRAVENOUS at 05:01

## 2019-01-20 RX ADMIN — ONDANSETRON 4 MG: 4 TABLET, ORALLY DISINTEGRATING ORAL at 05:01

## 2019-01-20 RX ADMIN — NITROGLYCERIN 0.4 MG: 0.4 TABLET, ORALLY DISINTEGRATING SUBLINGUAL at 05:01

## 2019-01-20 RX ADMIN — ASPIRIN 325 MG ORAL TABLET 325 MG: 325 PILL ORAL at 05:01

## 2019-01-20 RX ADMIN — NITROGLYCERIN 1 INCH: 20 OINTMENT TOPICAL at 05:01

## 2019-01-20 NOTE — ED PROVIDER NOTES
Encounter Date: 1/20/2019    SCRIBE #1 NOTE: I, Arthur Austin, am scribing for, and in the presence of,  . I have scribed the entire note.       History     Chief Complaint   Patient presents with    Shortness of Breath     72 year old female presents to ed cc of chronic sob states sob has gotten worse over past few days.      This is a 72 y.o. female who has a past medical history of Asthma, Coronary artery disease, Diabetes mellitus, Hypertension, MI, old, and Renal disorder.     The patient presents to the Emergency Department with SOB.  Pt has been having SOB for months progressively getting worse today  Family reports pt's legs have been more swollen than usual the past 2 days  Patient has non radiating left sided chest pain, cough, nausea, cold sweats, fatigue and activity change  Patient is on 2L of oxygen at home.      The history is provided by the patient. The history is limited by a language barrier. A  was used.     Review of patient's allergies indicates:  No Known Allergies  Past Medical History:   Diagnosis Date    Asthma     Coronary artery disease     pacemaker    Diabetes mellitus     Hypertension     MI, old     Renal disorder      Past Surgical History:   Procedure Laterality Date    CARDIAC PACEMAKER PLACEMENT      CARDIAC PACEMAKER PLACEMENT      CARDIAC PACEMAKER PLACEMENT      HYSTERECTOMY      SHOULDER SURGERY       History reviewed. No pertinent family history.  Social History     Tobacco Use    Smoking status: Former Smoker    Smokeless tobacco: Never Used   Substance Use Topics    Alcohol use: No    Drug use: No     Review of Systems   Constitutional: Positive for activity change, chills, diaphoresis and fatigue. Negative for fever.   HENT: Positive for congestion, rhinorrhea and sore throat.    Respiratory: Positive for cough and shortness of breath.    Cardiovascular: Positive for chest pain.   Gastrointestinal: Positive for nausea. Negative  for abdominal pain and vomiting.   Genitourinary: Negative for dysuria.   Musculoskeletal: Negative for back pain.        Leg swelling   Neurological: Negative for headaches.       Physical Exam     Initial Vitals [01/20/19 1722]   BP Pulse Resp Temp SpO2   (!) 155/97 89 (!) 25 98.2 °F (36.8 °C) 98 %      MAP       --         Physical Exam    Nursing note and vitals reviewed.  Constitutional: She is not diaphoretic. She appears distressed.   Morbidly obese elderly female, in respiratory distress   HENT:   Head: Normocephalic and atraumatic.   Mouth/Throat: Oropharynx is clear and moist.   Eyes: EOM are normal. Pupils are equal, round, and reactive to light.   Neck: Normal range of motion. Neck supple.   Cardiovascular: Normal rate, regular rhythm, normal heart sounds and intact distal pulses.   Pulmonary/Chest: She is in respiratory distress. She has no wheezes. She has no rhonchi. She has rales. She exhibits tenderness.   Diminished breath sounds bilaterally   Abdominal: Soft. Bowel sounds are normal. She exhibits no distension. There is no tenderness.   Musculoskeletal: Normal range of motion. She exhibits edema and tenderness.   Lymphadenopathy:     She has no cervical adenopathy.   Neurological: She is alert and oriented to person, place, and time. She has normal strength. GCS score is 15. GCS eye subscore is 4. GCS verbal subscore is 5. GCS motor subscore is 6.   Skin: Skin is warm and dry. Capillary refill takes less than 2 seconds.   Psychiatric: She has a normal mood and affect. Thought content normal.         ED Course   Critical Care  Date/Time: 1/20/2019 7:09 PM  Performed by: Jose Yeung MD  Authorized by: Jose Yeung MD   Total critical care time (exclusive of procedural time) : 0 minutes  Critical care time was exclusive of separately billable procedures and treating other patients and teaching time.  Critical care was necessary to treat or prevent imminent or life-threatening deterioration of  the following conditions: respiratory failure.  Critical care was time spent personally by me on the following activities: blood draw for specimens, development of treatment plan with patient or surrogate, discussions with consultants, interpretation of cardiac output measurements, evaluation of patient's response to treatment, examination of patient, obtaining history from patient or surrogate, ordering and performing treatments and interventions, ordering and review of radiographic studies, ordering and review of laboratory studies, pulse oximetry, re-evaluation of patient's condition and review of old charts.        Labs Reviewed   CBC W/ AUTO DIFFERENTIAL - Abnormal; Notable for the following components:       Result Value    MCHC 30.6 (*)     RDW 16.9 (*)     Lymph # 0.8 (*)     Gran% 78.4 (*)     Lymph% 13.1 (*)     All other components within normal limits   COMPREHENSIVE METABOLIC PANEL - Abnormal; Notable for the following components:    Glucose 195 (*)     BUN, Bld 28 (*)     eGFR if  48 (*)     eGFR if non  42 (*)     All other components within normal limits   TROPONIN I - Abnormal; Notable for the following components:    Troponin I 0.029 (*)     All other components within normal limits   B-TYPE NATRIURETIC PEPTIDE - Abnormal; Notable for the following components:    BNP 2,032 (*)     All other components within normal limits   POCT GLUCOSE - Abnormal; Notable for the following components:    POCT Glucose 172 (*)     All other components within normal limits   ISTAT PROCEDURE - Abnormal; Notable for the following components:    POC PH 7.326 (*)     POC PCO2 53.6 (*)     POC PO2 156 (*)     POC TCO2 30 (*)     All other components within normal limits     EKG Readings: (Independently Interpreted)   EKG: ventricular paced rhythm at 81 bpm, nl axis, nl intervals, no hypertrophy, no ST-T changes as read by me (Dr. Yeung).       Imaging Results          X-Ray Chest AP  Portable (Final result)  Result time 01/20/19 18:37:03    Final result by Reta Vargas MD (01/20/19 18:37:03)                 Impression:      Cardiomegaly with suspected mild pulmonary interstitial edema.      Electronically signed by: Reta Vargas MD  Date:    01/20/2019  Time:    18:37             Narrative:    EXAMINATION:  XR CHEST AP PORTABLE    CLINICAL HISTORY:  CHF;    TECHNIQUE:  Single frontal view of the chest was performed.    COMPARISON:  September 2018.    FINDINGS:  There is stable cardiomegaly with suspected pulmonary vascular congestion and mild interstitial edema.  Lungs are symmetrically expanded.  No evidence of focal consolidation, pneumothorax, or significant effusion.  Left-sided pacer device is seen.  No acute osseous abnormality identified.                                 Medical Decision Making:   Initial Assessment:   This is an emergent evaluation of a 72 y.o. female who presents with shortness of breath.   Initial differentials included COPD, CHF, Asthma, Pneumonia, PE, influenza, viral URI, viral syndrome, pleural effusion  Plan: CBC, CMP, troponin, BNP, chest x-ray, ASA, Nitro  Clinical Tests:   Lab Tests: Ordered and Reviewed  Radiological Study: Ordered and Reviewed  Medical Tests: Ordered and Reviewed                       7:10 PM  Pt improved on BIPAP. ABG shows mild acidosis and hypercapnia. Will need admission for further resp support and diuresis.  Spoke with Dr. Kenney, [facility] resident. Pt admitted to ICU      Clinical Impression:     1. Acute on chronic respiratory failure with hypoxia and hypercapnia    2. SOB (shortness of breath)    3. Shortness of breath    4. Acute congestive heart failure            Disposition:   Disposition: Admitted  Condition: Fair       I, Dr. Jose Yeung, personally performed the services described in this documentation.   All medical record entries made by the scribe were at my direction and in my presence.   I have reviewed  the chart and agree that the record is accurate and complete.   Jose Yeung MD.                   Jose Yeung MD  01/20/19 1943

## 2019-01-21 ENCOUNTER — TELEPHONE (OUTPATIENT)
Dept: OBSTETRICS AND GYNECOLOGY | Facility: HOSPITAL | Age: 74
End: 2019-01-21

## 2019-01-21 PROBLEM — I50.33 ACUTE ON CHRONIC DIASTOLIC CONGESTIVE HEART FAILURE: Status: ACTIVE | Noted: 2019-01-20

## 2019-01-21 PROBLEM — Z79.4 TYPE 2 DIABETES MELLITUS WITH STAGE 3 CHRONIC KIDNEY DISEASE, WITH LONG-TERM CURRENT USE OF INSULIN: Status: ACTIVE | Noted: 2019-01-21

## 2019-01-21 PROBLEM — E11.22 TYPE 2 DIABETES MELLITUS WITH STAGE 3 CHRONIC KIDNEY DISEASE, WITH LONG-TERM CURRENT USE OF INSULIN: Status: ACTIVE | Noted: 2019-01-21

## 2019-01-21 PROBLEM — Z86.73 HISTORY OF STROKE: Status: ACTIVE | Noted: 2019-01-21

## 2019-01-21 PROBLEM — J96.22 ACUTE ON CHRONIC RESPIRATORY FAILURE WITH HYPOXIA AND HYPERCAPNIA: Status: ACTIVE | Noted: 2019-01-21

## 2019-01-21 PROBLEM — E66.01 MORBID OBESITY: Status: ACTIVE | Noted: 2019-01-21

## 2019-01-21 PROBLEM — R53.81 PHYSICAL DECONDITIONING: Status: ACTIVE | Noted: 2019-01-21

## 2019-01-21 PROBLEM — R79.89 ELEVATED TROPONIN: Status: ACTIVE | Noted: 2019-01-21

## 2019-01-21 PROBLEM — I10 UNCONTROLLED HYPERTENSION: Status: ACTIVE | Noted: 2019-01-21

## 2019-01-21 PROBLEM — N18.30 TYPE 2 DIABETES MELLITUS WITH STAGE 3 CHRONIC KIDNEY DISEASE, WITH LONG-TERM CURRENT USE OF INSULIN: Status: ACTIVE | Noted: 2019-01-21

## 2019-01-21 PROBLEM — J96.21 ACUTE ON CHRONIC RESPIRATORY FAILURE WITH HYPOXIA AND HYPERCAPNIA: Status: ACTIVE | Noted: 2019-01-21

## 2019-01-21 LAB
ALBUMIN SERPL BCP-MCNC: 3.2 G/DL
ALP SERPL-CCNC: 99 U/L
ALT SERPL W/O P-5'-P-CCNC: 31 U/L
ANION GAP SERPL CALC-SCNC: 8 MMOL/L
AST SERPL-CCNC: 18 U/L
BACTERIA #/AREA URNS HPF: ABNORMAL /HPF
BILIRUB SERPL-MCNC: 0.7 MG/DL
BILIRUB UR QL STRIP: NEGATIVE
BUN SERPL-MCNC: 29 MG/DL
CALCIUM SERPL-MCNC: 8.7 MG/DL
CHLORIDE SERPL-SCNC: 107 MMOL/L
CHOLEST SERPL-MCNC: 108 MG/DL
CHOLEST/HDLC SERPL: 3.1 {RATIO}
CLARITY UR: CLEAR
CO2 SERPL-SCNC: 27 MMOL/L
COLOR UR: YELLOW
CREAT SERPL-MCNC: 1.4 MG/DL
EST. GFR  (AFRICAN AMERICAN): 44 ML/MIN/1.73 M^2
EST. GFR  (NON AFRICAN AMERICAN): 38 ML/MIN/1.73 M^2
ESTIMATED AVG GLUCOSE: 177 MG/DL
GLUCOSE SERPL-MCNC: 168 MG/DL
GLUCOSE UR QL STRIP: NEGATIVE
HBA1C MFR BLD HPLC: 7.8 %
HDLC SERPL-MCNC: 35 MG/DL
HDLC SERPL: 32.4 %
HGB UR QL STRIP: ABNORMAL
HYALINE CASTS #/AREA URNS LPF: 7 /LPF
KETONES UR QL STRIP: NEGATIVE
LDLC SERPL CALC-MCNC: 60.6 MG/DL
LEUKOCYTE ESTERASE UR QL STRIP: ABNORMAL
MAGNESIUM SERPL-MCNC: 1.7 MG/DL
MICROSCOPIC COMMENT: ABNORMAL
NITRITE UR QL STRIP: NEGATIVE
NONHDLC SERPL-MCNC: 73 MG/DL
PH UR STRIP: 5 [PH] (ref 5–8)
PHOSPHATE SERPL-MCNC: 4.3 MG/DL
POCT GLUCOSE: 169 MG/DL (ref 70–110)
POCT GLUCOSE: 179 MG/DL (ref 70–110)
POCT GLUCOSE: 181 MG/DL (ref 70–110)
POCT GLUCOSE: 192 MG/DL (ref 70–110)
POTASSIUM SERPL-SCNC: 4.3 MMOL/L
PROT SERPL-MCNC: 6 G/DL
PROT UR QL STRIP: NEGATIVE
RBC #/AREA URNS HPF: 9 /HPF (ref 0–4)
SODIUM SERPL-SCNC: 142 MMOL/L
SP GR UR STRIP: 1.02 (ref 1–1.03)
TRIGL SERPL-MCNC: 62 MG/DL
TROPONIN I SERPL DL<=0.01 NG/ML-MCNC: 0.03 NG/ML
URN SPEC COLLECT METH UR: ABNORMAL
UROBILINOGEN UR STRIP-ACNC: NEGATIVE EU/DL
WBC #/AREA URNS HPF: 5 /HPF (ref 0–5)

## 2019-01-21 PROCEDURE — 94660 CPAP INITIATION&MGMT: CPT

## 2019-01-21 PROCEDURE — 84100 ASSAY OF PHOSPHORUS: CPT

## 2019-01-21 PROCEDURE — 87086 URINE CULTURE/COLONY COUNT: CPT

## 2019-01-21 PROCEDURE — 80053 COMPREHEN METABOLIC PANEL: CPT

## 2019-01-21 PROCEDURE — 63600175 PHARM REV CODE 636 W HCPCS: Performed by: STUDENT IN AN ORGANIZED HEALTH CARE EDUCATION/TRAINING PROGRAM

## 2019-01-21 PROCEDURE — 86580 TB INTRADERMAL TEST: CPT | Performed by: STUDENT IN AN ORGANIZED HEALTH CARE EDUCATION/TRAINING PROGRAM

## 2019-01-21 PROCEDURE — A4216 STERILE WATER/SALINE, 10 ML: HCPCS | Performed by: STUDENT IN AN ORGANIZED HEALTH CARE EDUCATION/TRAINING PROGRAM

## 2019-01-21 PROCEDURE — 83735 ASSAY OF MAGNESIUM: CPT

## 2019-01-21 PROCEDURE — 97116 GAIT TRAINING THERAPY: CPT

## 2019-01-21 PROCEDURE — 99900035 HC TECH TIME PER 15 MIN (STAT)

## 2019-01-21 PROCEDURE — 12000002 HC ACUTE/MED SURGE SEMI-PRIVATE ROOM

## 2019-01-21 PROCEDURE — 27000221 HC OXYGEN, UP TO 24 HOURS

## 2019-01-21 PROCEDURE — 25000003 PHARM REV CODE 250: Performed by: STUDENT IN AN ORGANIZED HEALTH CARE EDUCATION/TRAINING PROGRAM

## 2019-01-21 PROCEDURE — 97166 OT EVAL MOD COMPLEX 45 MIN: CPT

## 2019-01-21 PROCEDURE — 94761 N-INVAS EAR/PLS OXIMETRY MLT: CPT

## 2019-01-21 PROCEDURE — 97802 MEDICAL NUTRITION INDIV IN: CPT

## 2019-01-21 PROCEDURE — 97161 PT EVAL LOW COMPLEX 20 MIN: CPT

## 2019-01-21 PROCEDURE — 81000 URINALYSIS NONAUTO W/SCOPE: CPT

## 2019-01-21 RX ADMIN — FUROSEMIDE 80 MG: 10 INJECTION, SOLUTION INTRAMUSCULAR; INTRAVENOUS at 10:01

## 2019-01-21 RX ADMIN — POLYETHYLENE GLYCOL 3350 17 G: 17 POWDER, FOR SOLUTION ORAL at 08:01

## 2019-01-21 RX ADMIN — HYDROCODONE BITARTRATE AND ACETAMINOPHEN 1 TABLET: 5; 325 TABLET ORAL at 06:01

## 2019-01-21 RX ADMIN — HEPARIN SODIUM 7500 UNITS: 5000 INJECTION, SOLUTION INTRAVENOUS; SUBCUTANEOUS at 10:01

## 2019-01-21 RX ADMIN — HEPARIN SODIUM 7500 UNITS: 5000 INJECTION, SOLUTION INTRAVENOUS; SUBCUTANEOUS at 06:01

## 2019-01-21 RX ADMIN — Medication 3 ML: at 10:01

## 2019-01-21 RX ADMIN — FUROSEMIDE 80 MG: 10 INJECTION, SOLUTION INTRAMUSCULAR; INTRAVENOUS at 01:01

## 2019-01-21 RX ADMIN — FUROSEMIDE 80 MG: 10 INJECTION, SOLUTION INTRAMUSCULAR; INTRAVENOUS at 03:01

## 2019-01-21 RX ADMIN — HEPARIN SODIUM 7500 UNITS: 5000 INJECTION, SOLUTION INTRAVENOUS; SUBCUTANEOUS at 03:01

## 2019-01-21 RX ADMIN — Medication 3 ML: at 03:01

## 2019-01-21 RX ADMIN — TUBERCULIN PURIFIED PROTEIN DERIVATIVE 5 UNITS: 5 INJECTION INTRADERMAL at 06:01

## 2019-01-21 RX ADMIN — ASPIRIN 81 MG 81 MG: 81 TABLET ORAL at 08:01

## 2019-01-21 RX ADMIN — CARVEDILOL 25 MG: 25 TABLET, FILM COATED ORAL at 06:01

## 2019-01-21 RX ADMIN — ATORVASTATIN CALCIUM 40 MG: 40 TABLET, FILM COATED ORAL at 08:01

## 2019-01-21 RX ADMIN — CARVEDILOL 25 MG: 25 TABLET, FILM COATED ORAL at 08:01

## 2019-01-21 RX ADMIN — HYDROCODONE BITARTRATE AND ACETAMINOPHEN 1 TABLET: 5; 325 TABLET ORAL at 03:01

## 2019-01-21 RX ADMIN — HYDRALAZINE HYDROCHLORIDE 25 MG: 25 TABLET, FILM COATED ORAL at 06:01

## 2019-01-21 NOTE — ED NOTES
APPEARANCE: Alert, oriented and in respiratory distress.  CARDIAC: Normal rate and rhythm, no murmur heard.   PERIPHERAL VASCULAR: peripheral pulses present. Normal cap refill. Bilateral 2+ edema noted to lower legs. Warm to touch.    RESPIRATORY:tachypneic, breath sounds coarse bilaterally throughout chest, wheezing. Respirations are equal and labored , SOB  GASTRO: soft, bowel sounds normal, epigastric tenderness, obese, nausea  MUSC: Full ROM. No bony tenderness or soft tissue tenderness. No obvious deformity.  SKIN: Skin is warm and dry, normal skin turgor, mucous membranes moist.  NEURO: 5/5 strength major flexors/extensors bilaterally. Sensory intact to light touch bilaterally. Sharif coma scale: eyes open spontaneously-4, oriented & converses-5, obeys commands-6. No neurological abnormalities.   MENTAL STATUS: awake, alert and aware of environment.  EYE: PERRL, both eyes: pupils brisk and reactive to light. Normal size.  ENT: EARS: no obvious drainage. NOSE: no active bleeding.   BREAST: symmetrical. No masses. No tenderness.  Pt reports left sided chest pain to touch around pace maker

## 2019-01-21 NOTE — PROGRESS NOTES
01/21/19 1626   Type of Frequent Check   Type Patient Rounds;Other (see comments)  (VN Rounds, [place] Speaking Only)   Safety/Activity   Patient Rounds bed in low position;visualized patient   Safety Promotion/Fall Prevention side rails raised x 2   Pain/Comfort/Sleep   Preferred Pain Scale FACES (Cruz-Melchor FACES Pain Rating Scale)   Pain Rating (0-10): Rest 0   Pain Rating (0-10): Activity 0   Assessments (Pre/Post)   Level of Consciousness (AVPU) alert

## 2019-01-21 NOTE — ED NOTES
PT TOLERATING O2 PER NASAL CANNULA WELL. RESPIRATIONS EVEN AND UNLABORED. NO ACUTE DISTRESS NOTED. AWAITING ADMIT ORDERS. PT AND FAMILY UPDATED WITH POC. V/U.

## 2019-01-21 NOTE — PROGRESS NOTES
01/21/19 1031   Type of Frequent Check   Type Patient Rounds;Other (see comments)  (VN Rounds, Patient Yak[place] speaking)   Safety/Activity   Patient Rounds bed in low position;visualized patient   Safety Promotion/Fall Prevention in recliner, wheels locked   Activity Management activity adjusted per tolerance*   Positioning   Body Position other (see comments)  (up in chair)   Assessments (Pre/Post)   Level of Consciousness (AVPU) alert

## 2019-01-21 NOTE — PLAN OF CARE
Problem: Physical Therapy Goal  Goal: Physical Therapy Goal  Goals to be met by: 19    Patient will increase functional independence with mobility by performin) Supine<>Sit with supervision.   2) Sit <>Stand with supervision and use of RW  3) Bed <>Chair with supervision and use of RW  4) Pt to ambulate at least 150 feet with supervision and use of RW  5) Pt to perform BLE X10 mod I with handout.       Outcome: Ongoing (interventions implemented as appropriate)  PT orders received, initial evaluation complete PT to follow.

## 2019-01-21 NOTE — ED NOTES
"Pt voided 1 time since Lasix was administered, pt stated, "it hurts when she urinates but gardiner not burn". Then proceeded to say,  "she was diagnosed with cervical cancer but prayed on it and cancer went away".  "

## 2019-01-21 NOTE — H&P
"[facility] Medicine H&P Note     Admitting Team: [facility] Hospitalist Team A  Attending Physician: Janine  Resident: Pablito  Intern: Epifanio     Date of Admit: 1/20/2019    Chief Complaint     Progressively worsening SOB for 2-3 days     Subjective:      History of Present Illness:  Franklin Araya is a 72 y.o. Turkmen-speaking female with a PMH diastolic heart failure, DM2, CKD3, Anemia, CVA, CAD, HTN, self-reported cervical cancer. The patient is a relatively poor historian so the utility of this history is limited despite using her friend as a reliable . Of note, the patient was discharged from [facility] in  in November of this year for what seems like a similar episode. Some information in this note is gleaned from the charts from that admission.   The patient was in their usual state of health, dealing with several chronic medical conditions and on 2 L home oxygen but able to care for herself, until a few months ago when she began to experience progressive SOB, edema. She had been recently discharged from [facility] for heart failure exacerbation, and states she felt good for about a month and then her symptoms she experienced prior to her admission started to come back. She also describes running out of her lasix about a month after her discharge which likely temporally correlates with these symptoms. She describes SOB, edema, generally "not feeling well" that progressively worsened until the point that she is unable to bathe or care for herself. She is unable to sleep at night due to trouble breathing and she endorses both PND and orthopnea. She says she "feels weak, like I'm gonna fall." She states this got so bad today that she felt like she "was gonna die" so she got her friend from [employer] to bring her to the hospital. She states that the only thing that has been able to make her feel better is the bipap. She endorses compliance with at home 2L O2 via NC regimen. She knows she ran out of her " lasix as described above but is unsure about her other medications since her daughter administers them. She does report a history of asthma that was diagnosed about 2 years ago. She states she uses an inhaler that hasn't been working. She denies chest pain, fevers, chills, syncope, falls, confusion, blurry vision.     Past Medical History:  Past Medical History:   Diagnosis Date    Asthma     Coronary artery disease     pacemaker    Diabetes mellitus     Hypertension     MI, old     Renal disorder        Past Surgical History:  Past Surgical History:   Procedure Laterality Date    CARDIAC PACEMAKER PLACEMENT      CARDIAC PACEMAKER PLACEMENT      CARDIAC PACEMAKER PLACEMENT      HYSTERECTOMY      SHOULDER SURGERY         Allergies:  Review of patient's allergies indicates:  No Known Allergies    Home Medications:  Prior to Admission medications    Medication Sig Start Date End Date Taking? Authorizing Provider   albuterol 90 mcg/actuation inhaler Inhale 1-2 puffs into the lungs every 6 (six) hours as needed for Wheezing. Rescue 10/11/17   Maeve Cade MD   albuterol-ipratropium (DUO-NEB) 2.5 mg-0.5 mg/3 mL nebulizer solution Take 3 mLs by nebulization every 4 (four) hours. Rescue 6/10/18 6/10/19  Carissa Barney PA-C   aspirin 81 MG Chew Take 1 tablet (81 mg total) by mouth once daily. 6/15/18 6/15/19  Wilber Soto MD   atorvastatin (LIPITOR) 40 MG tablet Take 1 tablet (40 mg total) by mouth once daily. 6/15/18 6/15/19  Wilber Soto MD   baclofen (LIORESAL) 20 MG tablet Take 1 tablet (20 mg total) by mouth 3 (three) times daily. 6/10/18 6/10/19  Carissa Barney PA-C   buPROPion (WELLBUTRIN SR) 100 MG TBSR 12 hr tablet Take 1 tablet (100 mg total) by mouth 2 (two) times daily. 6/10/18 6/10/19  Carissa Barney PA-C   carvedilol (COREG) 12.5 MG tablet Take 1 tablet (12.5 mg total) by mouth 2 (two) times daily with meals. 7/15/17 7/15/18  Erlinda Gonzalez PA-C   famotidine (PEPCID) 20 MG  tablet Take 1 tablet (20 mg total) by mouth once daily. 18  Carissa Barney PA-C   gabapentin (NEURONTIN) 300 MG capsule Take 1 capsule (300 mg total) by mouth every evening. 17  Garrison Ochoa MD   hydrocodone-acetaminophen 5-325mg (NORCO) 5-325 mg per tablet Take 1 tablet by mouth every 6 (six) hours as needed for Pain. 17   Garrison Ochoa MD   insulin aspart U-100 (NOVOLOG) 100 unit/mL InPn pen Inject 4 Units into the skin 3 (three) times daily. 18  Wilber Soto MD   insulin glargine (LANTUS) 100 unit/mL injection Inject 3 Units into the skin once daily. 18  Wilber Soto MD   oxybutynin (DITROPAN) 5 MG Tab Take 1 tablet (5 mg total) by mouth 2 (two) times daily. 6/10/18 6/10/19  Carissa Barney PA-C     The patient is unsure of her med history because her daughter administers her medications     Family History:  Mom dad brothers had heart disease in their old age     Social History:  Social History     Tobacco Use    Smoking status: Former Smoker; quit at age 36 (45 years ago)    Smokeless tobacco: Never Used   Substance Use Topics    Alcohol use: No    Drug use: Former cocaine user (quit 45 years ago)   Lives with daughter,who seems to be her primary caretaker     Review of Systems:  Pertinent items are noted in HPI. All other systems are reviewed and are negative.    Health Maintenance :   Primary Care Physician: Sawyer     Immunizations:   TDap unknown  Flu 2018  Pna unknown    Cancer Screening:  PAP: patient reports history of cervical cancer diagnosed by pap. Last visit with obgyn was months ago   MMG: unknown  Colonoscopy: unknown     Objective:   Last 24 Hour Vital Signs:  BP  Min: 153/70  Max: 208/94  Temp  Av.9 °F (36.6 °C)  Min: 97.7 °F (36.5 °C)  Max: 98.2 °F (36.8 °C)  Pulse  Av.6  Min: 62  Max: 89  Resp  Av.3  Min: 20  Max: 31  SpO2  Av.5 %  Min: 97 %  Max: 100 %  Weight  Av.1 kg (245 lb)  Min:  111.1 kg (245 lb)  Max: 111.1 kg (245 lb)  Body mass index is 47.85 kg/m².  I/O last 3 completed shifts:  In: -   Out: 250 [Urine:250]    Physical Examination:  General: Very obese female, NAD, on 3L NC   HENT: NCAT, nares patent with NC, PERRLA, EOMI, no pharyngeal erythema   Cardio: RRR, no MGR, distal pulses not palpable due to severe edema  Pulm: CTABL, no wheezing, patient satting well on 3L NC, weaned off bipap, no respiratory distress at this time   Abd: very obese, faustino to palpation subumbilical region, positive fluid wave  Ext: impressive edema, symmetric, TTP   Skin: warm and dry  Psych: appropriate mood and affect   Neuro: AAOx3, no focal deficits    Laboratory:  Most Recent Data:  CBC:   Lab Results   Component Value Date    WBC 5.90 01/20/2019    HGB 12.2 01/20/2019    HCT 39.9 01/20/2019     01/20/2019    MCV 95 01/20/2019    RDW 16.9 (H) 01/20/2019     BMP:   Lab Results   Component Value Date     01/20/2019    K 4.8 01/20/2019     01/20/2019    CO2 25 01/20/2019    BUN 28 (H) 01/20/2019    CREATININE 1.3 01/20/2019     (H) 01/20/2019    CALCIUM 9.0 01/20/2019    MG 1.6 06/14/2018    PHOS 3.6 06/14/2018     LFTs:   Lab Results   Component Value Date    PROT 6.8 01/20/2019    ALBUMIN 3.8 01/20/2019    BILITOT 0.9 01/20/2019    AST 23 01/20/2019    ALKPHOS 127 01/20/2019    ALT 38 01/20/2019     Coags:   Lab Results   Component Value Date    INR 1.0 09/26/2018     FLP:   Lab Results   Component Value Date    CHOL 148 06/10/2018    HDL 47 06/10/2018    LDLCALC 83.4 06/10/2018    TRIG 88 06/10/2018    CHOLHDL 31.8 06/10/2018     DM:   Lab Results   Component Value Date    HGBA1C 10.5 (H) 06/10/2018    HGBA1C 10.1 (H) 06/08/2018    HGBA1C 10.1 (H) 06/08/2018    LDLCALC 83.4 06/10/2018    CREATININE 1.3 01/20/2019     Thyroid:   Lab Results   Component Value Date    TSH 1.535 06/10/2018    FREET4 1.00 12/14/2011     Anemia: No results found for: IRON, TIBC, FERRITIN, [id number],  FOLATE  Cardiac:   Lab Results   Component Value Date    TROPONINI 0.029 (H) 01/20/2019    BNP 2,032 (H) 01/20/2019     Urinalysis:   Lab Results   Component Value Date    LABURIN  06/07/2018     ESCHERICHIA COLI  >100,000 cfu/ml  Skin shari also present      COLORU Yellow 09/26/2018    SPECGRAV 1.025 09/26/2018    NITRITE Negative 09/26/2018    KETONESU Negative 09/26/2018    UROBILINOGEN Negative 09/26/2018    WBCUA 2 09/26/2018       Trended Lab Data:  Recent Labs   Lab 01/20/19  1746   WBC 5.90   HGB 12.2   HCT 39.9      MCV 95   RDW 16.9*      K 4.8      CO2 25   BUN 28*   CREATININE 1.3   *   PROT 6.8   ALBUMIN 3.8   BILITOT 0.9   AST 23   ALKPHOS 127   ALT 38       Trended Cardiac Data:  Recent Labs   Lab 01/20/19  1746   TROPONINI 0.029*   BNP 2,032*           Other Results:      Radiology:  Imaging Results          X-Ray Chest AP Portable (Final result)  Result time 01/20/19 18:37:03    Final result by Reta Vargas MD (01/20/19 18:37:03)                 Impression:      Cardiomegaly with suspected mild pulmonary interstitial edema.      Electronically signed by: Reta Vargas MD  Date:    01/20/2019  Time:    18:37             Narrative:    EXAMINATION:  XR CHEST AP PORTABLE    CLINICAL HISTORY:  CHF;    TECHNIQUE:  Single frontal view of the chest was performed.    COMPARISON:  September 2018.    FINDINGS:  There is stable cardiomegaly with suspected pulmonary vascular congestion and mild interstitial edema.  Lungs are symmetrically expanded.  No evidence of focal consolidation, pneumothorax, or significant effusion.  Left-sided pacer device is seen.  No acute osseous abnormality identified.                                   Assessment:     Franklin Araya is a 72 y.o. female with:  Patient Active Problem List    Diagnosis Date Noted    Acute congestive heart failure 01/20/2019    Noncompliance 06/14/2018    Vaginal bleeding 06/13/2018    Cytotoxic cerebral  "edema 06/11/2018    Pulmonary nodule 06/11/2018    Esophageal dysphagia 06/11/2018    Mixed hyperlipidemia 06/11/2018    Embolic stroke involving left middle cerebral artery 06/10/2018    Received tissue plasminogen activator (t-PA) less than 24 hours prior to arrival 06/10/2018    Elevated serum creatinine 06/08/2018    Debility 06/08/2018    SOB (shortness of breath) 06/07/2018    COPD exacerbation 06/07/2018    Essential hypertension 06/07/2018    Type 2 diabetes mellitus, with long-term current use of insulin 06/07/2018    Pulmonary edema 06/07/2018        Plan:     Acute on chronic Hypoxic and Hypercapnic Respiratory Failure  - Saturating 94% on 5L; however, concerned for persistent increased WOB on BIPAP; at time of interview, patient requesting to transition back to NC; saturating 98% on 3L NC  - still with increased WOB but able to speak in close to full sentences; states breathing feels "much better at this time  - Hx, anasarca and rales on PE, CXR and BNP c/w pulmonary edema secondary to CHF; ran out of lasix > 1 mo ago  - continuing NC during the day and BIPAP qhs     Acute on Chronic Diastolic HF  - Admission to [facility] in [place] in 11/2018 with similar presentation; TTE at that time demonstrating grade 2 diastolic dysfunction; pulm artery pressure of 62; discharged on lasix 40 BID; patient states ran out within first month after discharge (never received/was able to get refills)  - s/p 80 mg IV lasix x 1 in ED; nurse in ED states two bed pads "soaked" after administration  - Hx, anasarca and rales on PE, CXR and BNP c/w pulmonary edema secondary to CHF  - Davis inserted for strict monitoring of I/O  - Daily weights  - Continuing 80 mg IV lasix q8H x 3 additional administrations  - Rpt TTE in AM      NSTEMI  - consistent with demand ischemia secondary to acute heart failure exacerbation  - Troponin of 0.03 in ED; Rpt pending  - denies CP; will continue to monitor "      Hypoattenuation in the Left inferior frontal lobe on CT c/w age-indeterminate infarc in June 2018  - unable to perform MRI at the time due to pacemaker  - Evaluated and TPA-ed for MCA stroke  - reports primary deficit post-CVA is memory loss     CKD 3a secondary to Diabetic Nephropathy   - Cr. 1.3; at baseline Cr of 1.3- 1.5  - Continuing to monitor     T2DM with Nephropathy  - A1c of 6.6 in Nov. 2018; rpt pending  - POCT BG and low-dose SSI initiated     Dysuria  - UA and U cx pending        HTN  - systolic BP elevated from 150s to 180s since admission  - s/p carvedilol 12.5 in ED  - restarting prior (current?) medications of Amlodipine 10 and Hydralazine 25 TID as well as carvedilol 25 BID     HLD  - continuing lipitor 40   - lipid panel pending      Morbid obesity  - BMI 48   - will  and discuss resources and treatment options prior to discharge     Deconditioned/Debility  - Consulting PT/OT for evaluation and treatment      Hx of cervical cancer?  - Per patient, informed in a letter that she had cervical cancer in fall 2018 at an OBGYN visit; no further documentation pertaining to this found on initial chart review; states never followed up thereafter  - Reports continuing to have intermittent vaginal bleeding and pain in this area  - Will consult OBGYN in AM for evaluation      Hx of MI and pacemaker placement  - prior documentation references placement in or prior to 2012  - no other information at this time  - will attempt to obtain more information from daughter and other family     Hx of Pulmonary nodule  -  Right upper lobe 5 mm solid pulmonary nodule on CTA in June 2018     Hx of Severe Depression  - px euthymic appearing during interview; will defer further discussion of mood with improvement in respiratory status  - per current medication list, not currently receiving anti-depressant; hx of tx with wellbutrin     Hx of Stenosis of SMA, 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

## 2019-01-21 NOTE — PROGRESS NOTES
"[facility] Hospital Medicine Progress Note    Primary Team: [facility] Hospitalist Team A  Attending Physician: Regina Mehta MD  Resident: Pablito  Intern: Epifanio    Subjective:      Patient's daughter translates for us through facetime   Patient feeling okay this morning   She says she is hungry   Daughter explains that patient's home life is perhaps not that stable living with other daughter   Patient describes it as "foundation falling apart"   Patient does however state that she feels safe at home      Objective:     Last 24 Hour Vital Signs:  BP  Min: 134/64  Max: 208/94  Temp  Av.1 °F (36.2 °C)  Min: 96 °F (35.6 °C)  Max: 98.2 °F (36.8 °C)  Pulse  Av.7  Min: 60  Max: 89  Resp  Av.2  Min: 18  Max: 31  SpO2  Av.1 %  Min: 97 %  Max: 100 %  Height  Av' (152.4 cm)  Min: 5' (152.4 cm)  Max: 5' (152.4 cm)  Weight  Av.3 kg (256 lb 7.9 oz)  Min: 111.1 kg (245 lb)  Max: 119.9 kg (264 lb 5.3 oz)  I/O last 3 completed shifts:  In: 80 [P.O.:80]  Out: 1725 [Urine:1725]    Physical Examination:  General: Very obese female, NAD, on 3L NC   HENT: NCAT, nares patent with NC, PERRLA, EOMI, no pharyngeal erythema   Cardio: RRR, no MGR, distal pulses not palpable due to severe edema  Pulm: CTABL, no wheezing, patient satting well on 3L NC, weaned off bipap, no respiratory distress at this time   Abd: very obese, faustino to palpation subumbilical region, positive fluid wave  Ext: impressive edema (skin wrinkling minor but present suggesting small improvement), symmetric, TTP   Skin: warm and dry  Psych: appropriate mood and affect   Neuro: AAOx3, no focal deficits      Laboratory:  Laboratory Data Reviewed: yes  Pertinent Findings:  Trop: 0.029 > 0.034  BNP:    A1C: 7.8     Microbiology Data Reviewed: yes  Pertinent Findings:  None         Current Medications:     Infusions:       Scheduled:   aspirin  81 mg Oral Daily    atorvastatin  40 mg Oral Daily    carvedilol  25 mg Oral BID WM    furosemide  80 " "mg Intravenous Q8H    heparin (porcine)  7,500 Units Subcutaneous Q8H    polyethylene glycol  17 g Oral Daily    sodium chloride 0.9%  3 mL Intravenous Q8H        PRN:  dextrose 50%, dextrose 50%, glucagon (human recombinant), glucose, glucose, hydrALAZINE, HYDROcodone-acetaminophen, insulin aspart U-100, ondansetron        Assessment:     Franklin Araya is a 72 y.o.female with  Patient Active Problem List    Diagnosis Date Noted    Acute congestive heart failure 01/20/2019    Noncompliance 06/14/2018    Vaginal bleeding 06/13/2018    Cytotoxic cerebral edema 06/11/2018    Pulmonary nodule 06/11/2018    Esophageal dysphagia 06/11/2018    Mixed hyperlipidemia 06/11/2018    Embolic stroke involving left middle cerebral artery 06/10/2018    Received tissue plasminogen activator (t-PA) less than 24 hours prior to arrival 06/10/2018    Elevated serum creatinine 06/08/2018    Debility 06/08/2018    SOB (shortness of breath) 06/07/2018    COPD exacerbation 06/07/2018    Essential hypertension 06/07/2018    Type 2 diabetes mellitus, with long-term current use of insulin 06/07/2018    Pulmonary edema 06/07/2018        Plan:     Acute on chronic Hypoxic and Hypercapnic Respiratory Failure  - on 2 L home O2 via NC   - Saturating 94% on 5L; however, concerned for persistent increased WOB on BIPAP; at time of interview, patient requesting to transition back to NC; saturating 98% on 3L NC  - still with increased WOB but able to speak in close to full sentences; states breathing feels "much better" at this time  - Hx, anasarca and rales on PE, CXR and BNP c/w pulmonary edema secondary to CHF; ran out of lasix > 1 mo ago  - continuing NC during the day and BIPAP qhs     Acute on Chronic Diastolic HF  - Admission to [facility] in [place] in 11/2018 with similar presentation; TTE at that time demonstrating grade 2 diastolic dysfunction; pulm artery pressure of 62; discharged on lasix 40 BID; " "patient states ran out within first month after discharge (never received/was able to get refills)  - s/p 80 mg IV lasix x 1 in ED; nurse in ED states two bed pads "soaked" after administration  - Hx, anasarca and rales on PE, CXR and BNP c/w pulmonary edema secondary to CHF  - Davis inserted for strict monitoring of I/O  - Daily weights  - Continuing 80 mg IV lasix q8H x 3 additional administrations  - Rpt TTE in AM   - responding to diuresis with appropriate output; continue to monitor ins and outs      NSTEMI  - consistent with demand ischemia secondary to acute heart failure exacerbation  - Troponin of 0.029 in ED; Rpt 0.034  - denies CP; will continue to monitor      Hypoattenuation in the Left inferior frontal lobe on CT c/w age-indeterminate infarc in June 2018  - unable to perform MRI at the time due to pacemaker  - Evaluated and TPA-ed for MCA stroke  - reports primary deficit post-CVA is memory loss     CKD 3a secondary to Diabetic Nephropathy   - Cr. 1.3; at baseline Cr of 1.3- 1.5  - Continuing to monitor     T2DM with Nephropathy  - A1c of 6.6 in Nov. 2018; rpt 7.8  - POCT BG and low-dose SSI initiated       Dysuria  - UA negative and U cx pending        HTN  - systolic BP elevated from 150s to 180s since admission  - s/p carvedilol 12.5 in ED  - restarting prior (current?) medications of Amlodipine 10 and Hydralazine 25 TID as well as carvedilol 25 BID     HLD  - continuing lipitor 40   - lipid panel with chol 108, HDL 35, LDL, 60.6      Morbid obesity  - BMI 48   - will  and discuss resources and treatment options prior to discharge     Deconditioned/Debility  - Consulting PT/OT for evaluation and treatment      Hx of cervical cancer?  - Per patient, informed in a letter that she had cervical cancer in fall 2018 at an OBGYN visit; no further documentation pertaining to this found on initial chart review; states never followed up thereafter  - Reports continuing to have intermittent vaginal " bleeding and pain in this area  - Will consult OBGYN this AM for evaluation      Hx of MI and pacemaker placement  - prior documentation references placement in or prior to 2012  - no other information at this time  - will attempt to obtain more information from daughter and other family     Hx of Pulmonary nodule  -  Right upper lobe 5 mm solid pulmonary nodule on CTA in June 2018     Hx of Severe Depression  - px euthymic appearing during interview; will defer further discussion of mood with improvement in respiratory status  - per current medication list, not currently receiving anti-depressant; hx of tx with wellbutrin 150 BID     Hx of Stenosis of SMA, 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

## 2019-01-21 NOTE — PT/OT/SLP EVAL
Physical Therapy Evaluation    Patient Name:  Franklin Araya   MRN:  [id number]    Recommendations:     Discharge Recommendations:  nursing facility, skilled   Discharge Equipment Recommendations: (Defer to SNF\)   Barriers to discharge: Decreased caregiver support    Assessment:     Franklin Araya is a 72 y.o. female admitted with a medical diagnosis of Acute on chronic respiratory failure with hypoxia and hypercapnia.  She presents with the following impairments/functional limitations:  weakness, impaired endurance, impaired self care skills, impaired functional mobilty, decreased safety awareness, pain. Pt ambulated up to ~30 feet with RW and CGA. Pt with frequent increased breathing and required verbal cueing to perform PLB. Pt would benefit from continued skilled acute care PT services as well as SNF placement upon hospital discharge to improve current impairments and return safely to a more independent functional mobility level.    Rehab Prognosis: Fair; patient would benefit from acute skilled PT services to address these deficits and reach maximum level of function.    Recent Surgery: * No surgery found *      Plan:     During this hospitalization, patient to be seen 6 x/week to address the identified rehab impairments via gait training, therapeutic activities, therapeutic exercises and progress toward the following goals:    · Plan of Care Expires:  02/21/19    Subjective   Chief Complaint: Decline in mobility.   Patient/Family Comments/goals: return to PLOF  Pain/Comfort:  · Pain Rating 1: (Pt did not rate pain)  · Location 1: (lower abdominal/ pelvic pain )    Patients cultural, spiritual, Mosque conflicts given the current situation: no    Living Environment:  Pt lives with her daughter (see case management/ social work notes for more information. According to their notes pt's daughter would like her mother to be discharged to NH). Pt reports PTA she was able to ambulate to the  restroom using RW. Pt states she required assistance with ADLs, including bathing, self hygiene, dressing and all ADLs and IADLs.    Prior to admission, patient required assistance with all ADLs.  Equipment used at home: bedside commode, oxygen, walker, rolling.  DME owned (not currently used): none.  Upon discharge, patient will have assistance from family possibly, it is unclear how much pt's family is able/willing to help.    Objective:     The  [employer]  system was used,  number [id number].     Pt found and remained on 1L O2 during evaluation and treatment session.     Communicated with MCKAY Rose prior to session.  Patient found bed alarm on bed alarm, oxygen, telemetry  upon PT entry to room.    General Precautions: Standard, fall   Orthopedic Precautions:N/A   Braces: N/A     Exams:  · Cognitive Exam:  Patient is oriented to Time and Situation  · Postural Exam:  Patient presented with the following abnormalities:    · -       Rounded shoulders  · Skin Integrity/Edema:      · -       Edema: Moderate BLE  · RLE ROM: WFL  · RLE Strength: WFL  · LLE ROM: Grossly 3+/5  · LLE Strength: Grossly 3+/5    Functional Mobility:  · Bed Mobility:     · Supine to Sit: maximal assistance  · Transfers:     · Sit to Stand:  minimum assistance with rolling walker  · Gait: Pt ambulated ~15 feet then ~15 feet X2  with RW and CGA.   · Balance: Sitting Fair + standing fair      Therapeutic Activities and Exercises:  · Bed mobility and transfers as listed above.   · Pt ambulated X3 trials, first trial of ~15 fee to the restroom and second two trials X15 feet each with a standing rest break between the two trials.   · See OT note for level of assistance required for toieting and toilet transfer.     AM-PAC 6 CLICK MOBILITY  Total Score:17     Patient left up in chair with all lines intact, call button in reach, chair alarm on and MCKAY Rose notified.    GOALS:   Multidisciplinary Problems     Physical Therapy Goals         Problem: Physical Therapy Goal    Goal Priority Disciplines Outcome Goal Variances Interventions   Physical Therapy Goal     PT, PT/OT Ongoing (interventions implemented as appropriate)     Description:  Goals to be met by: 19    Patient will increase functional independence with mobility by performin) Supine<>Sit with supervision.   2) Sit <>Stand with supervision and use of RW  3) Bed <>Chair with supervision and use of RW  4) Pt to ambulate at least 150 feet with supervision and use of RW  5) Pt to perform BLE X10 mod I with handout.                         History:     Past Medical History:   Diagnosis Date    Asthma     Coronary artery disease     pacemaker    Diabetes mellitus     Hypertension     MI, old     Renal disorder        Past Surgical History:   Procedure Laterality Date    CARDIAC PACEMAKER PLACEMENT      CARDIAC PACEMAKER PLACEMENT      CARDIAC PACEMAKER PLACEMENT      HYSTERECTOMY      SHOULDER SURGERY         Clinical Decision Making:     History  Co-morbidities and personal factors that may impact the plan of care Examination  Body Structures and Functions, activity limitations and participation restrictions that may impact the plan of care Clinical Presentation   Decision Making/ Complexity Score   Co-morbidities:   [] Time since onset of injury / illness / exacerbation  [] Status of current condition  []Patient's cognitive status and safety concerns    [] Multiple Medical Problems (see med hx)  Personal Factors:   [] Patient's age  [] Prior Level of function   [] Patient's home situation (environment and family support)  [] Patient's level of motivation  [] Expected progression of patient      HISTORY:(criteria)    [] 80428 - no personal factors/history    [] 20658 - has 1-2 personal factor/comorbidity     [] 50856 - has >3 personal factor/comorbidity     Body Regions:  [] Objective examination findings  [] Head     []  Neck  [] Trunk   [] Upper Extremity  [] Lower  Extremity    Body Systems:  [] For communication ability, affect, cognition, language, and learning style: the assessment of the ability to make needs known, consciousness, orientation (person, place, and time), expected emotional /behavioral responses, and learning preferences (eg, learning barriers, education  needs)  [] For the neuromuscular system: a general assessment of gross coordinated movement (eg, balance, gait, locomotion, transfers, and transitions) and motor function  (motor control and motor learning)  [] For the musculoskeletal system: the assessment of gross symmetry, gross range of motion, gross strength, height, and weight  [] For the integumentary system: the assessment of pliability(texture), presence of scar formation, skin color, and skin integrity  [] For cardiovascular/pulmonary system: the assessment of heart rate, respiratory rate, blood pressure, and edema     Activity limitations:    [] Patient's cognitive status and saf ety concerns          [] Status of current condition      [] Weight bearing restriction  [] Cardiopulmunary Restriction    Participation Restrictions:   [] Goals and goal agreement with the patient     [] Rehab potential (prognosis) and probable outcome      Examination of Body System: (criteria)    [] 75913 - addressing 1-2 elements    [] 36879 - addressing a total of 3 or more elements     [] 22422 -  Addressing a total of 4 or more elements         Clinical Presentation: (criteria)  Choose one     On examination of body system using standardized tests and measures patient presents with (CHOOSE ONE) elements from any of the following: body structures and functions, activity limitations, and/or participation restrictions.  Leading to a clinical presentation that is considered (CHOOSE ONE)                              Clinical Decision Making  (Eval Complexity):  Choose One     Time Tracking:     PT Received On: 01/21/19  PT Start Time: 0955     PT Stop Time: 1022  PT  Total Time (min): 27 min with OT    Billable Minutes: Evaluation 10 and Therapeutic Activity 17      Giovanni Beltran PT, DPT  01/21/2019

## 2019-01-21 NOTE — ED NOTES
PT RESTING ON ED STRETCHER WITH FAMILY AT BEDSIDE. TOLERATING BIPAP WELL. SR NOTED ON CARDIAC MONITOR. AWAITING ADMISSION ORDERS AND ROOM ASSIGNMENT. PT AND FAMILY UPDATED WITH POC. V/U.

## 2019-01-21 NOTE — PLAN OF CARE
Emelina used to communicate w/the pt this morning.  Plan of care reviewed w/pt.  Medications reviewed w/pt and pt verbalized understanding. Pain management addressed.

## 2019-01-21 NOTE — PLAN OF CARE
Problem: Adult Inpatient Plan of Care  Goal: Plan of Care Review  Outcome: Ongoing (interventions implemented as appropriate)  O2 saturation 96% on nasal cannula 2 lpm. Will continue to monitor.

## 2019-01-21 NOTE — ED NOTES
Physician at bedside (ADMIT TEAM) FOR EVAL. PT TAKEN OFF OF BIPAP AND PLACED ON 4 LITERS O2 PER NASAL CANNULA PER VERBAL ORDER PER ADMIT TEAM. PT TOLERATING WELL--SPO2 100%.

## 2019-01-21 NOTE — CONSULTS
[facility]  Adult Nutrition  Consult Note    SUMMARY     Recommendations    1. Continue current diabetic, cardiac diet.   2. Encourage good diet compliance.   Goals: Pt to meet 85% of EEN/EPN.   Nutrition Goal Status: goal met  Communication of RD Recs: (POC)    Reason for Assessment    Reason For Assessment: consult(Morbid obesity/Heart failure)  Diagnosis: (SOB x 3 days)  Relevant Medical History: CAD, DM, HTN, MI, Renal disorder  General Information Comments: Pt with good appetite and intake. Daughter reports pt has never had any type of diet education. Wt loss tips handout offered and explained. Pt w/o any questions. Does not seem to accept the importance of wt loss and healthy eating. NFPE performed 1/21/2018. Pt appears to be well nourished.   Nutrition Discharge Planning: Pt to d/c on a cardiac, diabetic diet.     Nutrition Risk Screen    Nutrition Risk Screen: no indicators present    Nutrition/Diet History    Patient Reported Diet/Restrictions/Preferences: general  Spiritual, Cultural Beliefs, Restorationism Practices, Values that Affect Care: no  Factors Affecting Nutritional Intake: None identified at this time    Anthropometrics    Temp: 96.5 °F (35.8 °C)  Height Method: Stated  Height: 5' (152.4 cm)  Height (inches): 60 in  Weight Method: Bed Scale  Weight: 119.9 kg (264 lb 5.3 oz)  Weight (lb): 264.33 lb  Ideal Body Weight (IBW), Female: 100 lb  % Ideal Body Weight, Female (lb): 260.15 lb  BMI (Calculated): 50.9  BMI Grade: greater than 40 - morbid obesity     Lab/Procedures/Meds    Pertinent Labs Reviewed: reviewed  Pertinent Labs Comments: BUN 29H, GFR 38L, Swa900N, Alb 3.2L, A1C 7.8H, POCT 179H  Pertinent Medications Reviewed: reviewed  Pertinent Medications Comments: statin, furosemide, heparin, ondansetron, poly glycol    Estimated/Assessed Needs    Weight Used For Calorie Calculations: 119.9 kg (264 lb 5.3 oz)  Energy Calorie Requirements (kcal): 1640 kcal/d  Energy Need Method:  Caroline[place]  Protein Requirements: 36-45 gm/d  Weight Used For Protein Calculations: 45.4 kg (100 lb)(IBW)  Fluid Requirements (mL): 1 mL/kcal or per MD  Estimated Fluid Requirement Method: RDA Method  RDA Method (mL): 1640  CHO Requirement: 205 gm/d    Nutrition Prescription Ordered    Current Diet Order: Diabetic, Cardiac    Evaluation of Received Nutrient/Fluid Intake    Energy Calories Required: meeting needs  Protein Required: meeting needs  Fluid Required: meeting needs  % Intake of Estimated Energy Needs: 75 - 100 %  % Meal Intake: 75 - 100 %    Nutrition Risk    Level of Risk/Frequency of Follow-up: (f/u 1x/weekly)     Assessment and Plan    Nutrition Problem  Obesity    Related to (etiology):   Excessive oral intake    Signs and Symptoms (as evidenced by):   BMI of 50.9    Interventions:  Decreased carbohydrate diet    Nutrition Diagnosis Status:   New     Monitor and Evaluation    Food and Nutrient Intake: energy intake, food and beverage intake  Food and Nutrient Adminstration: diet order  Knowledge/Beliefs/Attitudes: food and nutrition knowledge/skill  Physical Activity and Function: nutrition-related ADLs and IADLs  Anthropometric Measurements: weight, weight change  Biochemical Data, Medical Tests and Procedures: lipid profile, electrolyte and renal panel, gastrointestinal profile, glucose/endocrine profile, inflammatory profile  Nutrition-Focused Physical Findings: overall appearance     Malnutrition Assessment    Subcutaneous Fat Loss (Final Summary): well nourished  Muscle Loss Evaluation (Final Summary): well nourished      Nutrition Follow-Up  Yes

## 2019-01-21 NOTE — PLAN OF CARE
TN met with pt and pt's family, pt lives with daughter Mary Anne but she travels back and forth to [place] to live with her other children, pt requires assistance with ADLs, pt has RW, BSC,  And oxygen, pt's daughter unsure of pt's home O2 company, she stated that the company is in [place], pt active with [facility], PT/OT rec is SNF, pt is agreeable,pt provided with SNF preference list, pt's preferences are 1.[facility] 2. [facility] 3. [facility] 4. [facility] , SW informed of that pt is agreeable to SNF.      Discharge brochure and blue discharge folder given to pt. TN updated contact information on whiteboard.       01/21/19 4297   Discharge Assessment   Assessment Type Discharge Planning Assessment   Confirmed/corrected address and phone number on facesheet? Yes   Assessment information obtained from? Patient;Caregiver;Medical Record   Prior to hospitilization cognitive status: Unable to Assess   Prior to hospitalization functional status: Assistive Equipment   Current cognitive status: Unable to Assess   Current Functional Status: Assistive Equipment   Lives With child(han), adult   Able to Return to Prior Arrangements (recommending SNF)   Is patient able to care for self after discharge? Unable to determine at this time (comments)   Who are your caregiver(s) and their phone number(s)? Mary Anne Victor Daughter [phone]    Patient's perception of discharge disposition admitted as an inpatient   Readmission Within the Last 30 Days no previous admission in last 30 days   Patient currently being followed by outpatient case management? No   Patient currently receives any other outside agency services? No   Equipment Currently Used at Home walker, rolling;bedside commode;oxygen   Do you have any problems affording any of your prescribed medications? No   Is the patient taking medications as prescribed? yes   Does the patient have transportation home? Yes   Transportation Anticipated  family or friend will provide   Does the patient receive services at the [facility]? No   Discharge Plan A Skilled Nursing Facility   Discharge Plan B Home with family;Home Health   Patient/Family in Agreement with Plan yes   Rissa Jones RN-BC  Transitional Navigator  [phone]

## 2019-01-21 NOTE — PROGRESS NOTES
ABG results reported to Dr. Yeung    Results for TAMARA ORDOÑEZ (MRN [id number]) as of 1/20/2019 19:02   Ref. Range 1/20/2019 18:53   POC PH Latest Ref Range: 7.35 - 7.45  7.326 (L)   POC PCO2 Latest Ref Range: 35 - 45 mmHg 53.6 (H)   POC PO2 Latest Ref Range: 80 - 100 mmHg 156 (H)   POC BE Latest Ref Range: -2 to 2 mmol/L 2   POC HCO3 Latest Ref Range: 24 - 28 mmol/L 28.0   POC SATURATED O2 Latest Ref Range: 95 - 100 % 99   POC TCO2 Latest Ref Range: 23 - 27 mmol/L 30 (H)   FiO2 Unknown 40   Sample Unknown ARTERIAL   DelSys Unknown CPAP/BiPAP   Allens Test Unknown Pass   Site Unknown RR   Mode Unknown BiPAP

## 2019-01-21 NOTE — PROGRESS NOTES
The Sw left a message for Fatuma at [facility] informing her therapy recs are snf for the pt.

## 2019-01-21 NOTE — PLAN OF CARE
Recommendations    1. Continue current diabetic, cardiac diet.   2. Encourage good diet compliance.   Goals: Pt to meet 85% of EEN/EPN.   Nutrition Goal Status: goal met

## 2019-01-21 NOTE — PLAN OF CARE
Problem: Occupational Therapy Goal  Goal: Occupational Therapy Goal  Goals to be met by: 02/21/2019      Patient will increase functional independence with ADLs by performing:    UE Dressing with Modified [place].  LE Dressing with Modified [place].  Grooming while standing with Set-up Assistance.  Toileting from toilet with Modified [place] for hygiene and clothing management.   Sitting at edge of bed x10 minutes with Supervision.  Supine to sit with Modified [place].  Toilet transfer to toilet with Supervision.  Increased functional strength to WFL for self care.  Upper extremity exercise program x10 reps per handout, with assistance as needed.    Outcome: Ongoing (interventions implemented as appropriate)  Pt would benefit from continued OT to address deficits in self care and functional mobility. Recommending SNF; DME needs TBD

## 2019-01-21 NOTE — H&P
"[facility] Internal Medicine History and Physical - Resident Note    Admitting Team: A  Attending Physician: Janine  Resident: Pablito  Interns: Epifanio    Date of Admit: 1/20/2019    Chief Complaint     Shortness of Breath (72 year old female presents to ed cc of chronic sob states sob has gotten worse over past few days. )      Subjective:      History of Present Illness:    Franklin Araya is a 72 y.o. Russian-speaking female with a PMH diastolic heart failure, Hx of L MCA CVA, DM2, CKD3, Anemia, CVA, CAD, HTN, morbid obesity, self-reported cervical cancer. The patient is a relatively poor historian so the utility of this history is limited despite using her friend as a reliable . Of note, the patient was discharged from [facility] in  in November of this year for what seems like a similar episode. Some information in this note is gleaned from the charts from that admission.     The patient was in their usual state of health, dealing with several chronic medical conditions and on 2 L home oxygen but able to care for herself, until a few months ago when she began to experience progressive SOB, edema. She had been recently discharged from [facility] for heart failure exacerbation, and states she felt good for about a month and then her symptoms she experienced prior to her admission started to come back. She also describes running out of her lasix about a month after her discharge which likely temporally correlates with these symptoms. She describes SOB, edema, generally "not feeling well" that progressively worsened until the point that she is unable to bathe or care for herself. She is unable to sleep at night due to trouble breathing and she endorses both PND and orthopnea. She says she "feels weak, like I'm gonna fall." She states this got so bad today that she felt like she "was gonna die" so she got her friend from [facility] to bring her to the hospital. She states that the only thing that has been able to " make her feel better is the bipap. She endorses compliance with at home 2L O2 via NC regimen. She knows she ran out of her lasix as described above but is unsure about her other medications since her daughter administers them. She does report a history of asthma that was diagnosed about 2 years ago. She states she uses an inhaler that hasn't been working. She denies chest pain, fevers, chills, syncope, falls, confusion, blurry vision.        Past Medical History:  As above    Past Surgical History:  Past Surgical History:   Procedure Laterality Date    CARDIAC PACEMAKER PLACEMENT      CARDIAC PACEMAKER PLACEMENT      CARDIAC PACEMAKER PLACEMENT      HYSTERECTOMY      SHOULDER SURGERY         Allergies:  Review of patient's allergies indicates:  No Known Allergies    Home Medications:     **PATIENT UNCLEAR OF MEDICATIONS; DAUGHTER ADMINISTERS AT HOME; DAUGHTER COMING TO HOSPITAL WITH MEDICATION LIST PER CONVERSATION WITH NURSING. **  Prior to Admission medications    Medication Sig Start Date End Date Taking? Authorizing Provider   albuterol 90 mcg/actuation inhaler Inhale 1-2 puffs into the lungs every 6 (six) hours as needed for Wheezing. Rescue 10/11/17   Maeve Cade MD   albuterol-ipratropium (DUO-NEB) 2.5 mg-0.5 mg/3 mL nebulizer solution Take 3 mLs by nebulization every 4 (four) hours. Rescue 6/10/18 6/10/19  Carissa Barney PA-C   aspirin 81 MG Chew Take 1 tablet (81 mg total) by mouth once daily. 6/15/18 6/15/19  Wilber Soto MD   atorvastatin (LIPITOR) 40 MG tablet Take 1 tablet (40 mg total) by mouth once daily. 6/15/18 6/15/19  Wilber Soto MD   baclofen (LIORESAL) 20 MG tablet Take 1 tablet (20 mg total) by mouth 3 (three) times daily. 6/10/18 6/10/19  Carissa Barney PA-C   buPROPion (WELLBUTRIN SR) 100 MG TBSR 12 hr tablet Take 1 tablet (100 mg total) by mouth 2 (two) times daily. 6/10/18 6/10/19  Carissa Barney PA-C   carvedilol (COREG) 12.5 MG tablet Take 1 tablet (12.5 mg  "total) by mouth 2 (two) times daily with meals. 7/15/17 7/15/18  Erlinda Gonzalez PA-C   famotidine (PEPCID) 20 MG tablet Take 1 tablet (20 mg total) by mouth once daily. 18  Carissa Barney PA-C   gabapentin (NEURONTIN) 300 MG capsule Take 1 capsule (300 mg total) by mouth every evening. 17  Garrison Ochoa MD   hydrocodone-acetaminophen 5-325mg (NORCO) 5-325 mg per tablet Take 1 tablet by mouth every 6 (six) hours as needed for Pain. 17   Garrison Ochoa MD   insulin aspart U-100 (NOVOLOG) 100 unit/mL InPn pen Inject 4 Units into the skin 3 (three) times daily. 18  Wilber Soto MD   insulin glargine (LANTUS) 100 unit/mL injection Inject 3 Units into the skin once daily. 18  Wilber Soto MD   oxybutynin (DITROPAN) 5 MG Tab Take 1 tablet (5 mg total) by mouth 2 (two) times daily. 6/10/18 6/10/19  Carissa Barney PA-C       Family History:  Reports both parents  of heart disease in "old age."     Social History:  Tobacco Use    Smoking status: Former Smoker; quit at age 36 (45 years ago)    Smokeless tobacco: Never Used   Substance Use Topics    Alcohol use: No     Former cocaine user (quit 45 years ago)   Lives with daughter,who seems to be her primary caretaker           Review of Systems:  Pertinent items are noted in HPI. All other systems are reviewed and are negative.    Health Maintaince :   Primary Care Physician: Sawyer     Immunizations:   TDap unknown  Flu 2018  Pna unknown     Cancer Screening:  PAP: patient reports history of cervical cancer diagnosed by pap. Last visit with obgyn was months ago   MMG: unknown  Colonoscopy: unknown        Objective:   Last 24 Hour Vital Signs:  BP  Min: 134/64  Max: 208/94  Temp  Av.4 °F (36.3 °C)  Min: 96 °F (35.6 °C)  Max: 98.2 °F (36.8 °C)  Pulse  Av.8  Min: 62  Max: 89  Resp  Av  Min: 20  Max: 31  SpO2  Av.3 %  Min: 97 %  Max: 100 %  Height  Av' (152.4 cm)  " Min: 5' (152.4 cm)  Max: 5' (152.4 cm)  Weight  Av.6 kg (252 lb 9.1 oz)  Min: 111.1 kg (245 lb)  Max: 118 kg (260 lb 2.3 oz)  Body mass index is 50.81 kg/m².  I/O last 3 completed shifts:  In: -   Out: 250 [Urine:250]    Physical Examination:  General: Very obese female, NAD, on 3L NC   HENT: NCAT, nares patent with NC, PERRLA, EOMI, no pharyngeal erythema   Cardio: RRR, no MGR, distal pulses not palpable due to severe edema  Pulm: CTABL, no wheezing, patient satting well on 3L NC, weaned off bipap, no respiratory distress at this time   Abd: very obese, faustino to palpation subumbilical region, positive fluid wave  Ext: impressive edema, symmetric, TTP   Skin: warm and dry  Psych: appropriate mood and affect   Neuro: AAOx3, no focal deficits        Laboratory:  Most Recent Data:  CBC:   Lab Results   Component Value Date    WBC 5.90 2019    HGB 12.2 2019    HCT 39.9 2019     2019    MCV 95 2019    RDW 16.9 (H) 2019     BMP:   Lab Results   Component Value Date     2019    K 4.8 2019     2019    CO2 25 2019    BUN 28 (H) 2019    CREATININE 1.3 2019     (H) 2019    CALCIUM 9.0 2019    MG 1.6 2018    PHOS 3.6 2018     LFTs:   Lab Results   Component Value Date    PROT 6.8 2019    ALBUMIN 3.8 2019    BILITOT 0.9 2019    AST 23 2019    ALKPHOS 127 2019    ALT 38 2019     Coags:   Lab Results   Component Value Date    INR 1.0 2018     FLP:   Lab Results   Component Value Date    CHOL 148 06/10/2018    HDL 47 06/10/2018    LDLCALC 83.4 06/10/2018    TRIG 88 06/10/2018    CHOLHDL 31.8 06/10/2018     DM:   Lab Results   Component Value Date    HGBA1C 10.5 (H) 06/10/2018    HGBA1C 10.1 (H) 2018    HGBA1C 10.1 (H) 2018    LDLCALC 83.4 06/10/2018    CREATININE 1.3 2019     Thyroid:   Lab Results   Component Value Date    TSH 1.535 06/10/2018     FREET4 1.00 12/14/2011     Anemia: No results found for: IRON, TIBC, FERRITIN, [id number], FOLATE  Cardiac:   Lab Results   Component Value Date    TROPONINI 0.029 (H) 01/20/2019    BNP 2,032 (H) 01/20/2019     Urinalysis:   Lab Results   Component Value Date    LABURIN  06/07/2018     ESCHERICHIA COLI  >100,000 cfu/ml  Skin shari also present      COLORU Yellow 09/26/2018    SPECGRAV 1.025 09/26/2018    NITRITE Negative 09/26/2018    KETONESU Negative 09/26/2018    UROBILINOGEN Negative 09/26/2018    WBCUA 2 09/26/2018       Trended Lab Data:  Recent Labs   Lab 01/20/19  1746   WBC 5.90   HGB 12.2   HCT 39.9      MCV 95   RDW 16.9*      K 4.8      CO2 25   BUN 28*   CREATININE 1.3   *   PROT 6.8   ALBUMIN 3.8   BILITOT 0.9   AST 23   ALKPHOS 127   ALT 38       Trended Cardiac Data:  Recent Labs   Lab 01/20/19  1746   TROPONINI 0.029*   BNP 2,032*       Microbiology Data:  u cx pending    Other Laboratory Data:    Other Results:  EKG (my interpretation):   Radiology:  Imaging Results          X-Ray Chest AP Portable (Final result)  Result time 01/20/19 18:37:03    Final result by Reta Vargas MD (01/20/19 18:37:03)                 Impression:      Cardiomegaly with suspected mild pulmonary interstitial edema.      Electronically signed by: Reta Vargas MD  Date:    01/20/2019  Time:    18:37             Narrative:    EXAMINATION:  XR CHEST AP PORTABLE    CLINICAL HISTORY:  CHF;    TECHNIQUE:  Single frontal view of the chest was performed.    COMPARISON:  September 2018.    FINDINGS:  There is stable cardiomegaly with suspected pulmonary vascular congestion and mild interstitial edema.  Lungs are symmetrically expanded.  No evidence of focal consolidation, pneumothorax, or significant effusion.  Left-sided pacer device is seen.  No acute osseous abnormality identified.                                   Assessment:     Franklin Araya is a 72 y.o. female with:  Patient Active  "Problem List    Diagnosis Date Noted    Acute congestive heart failure 01/20/2019    Noncompliance 06/14/2018    Vaginal bleeding 06/13/2018    Cytotoxic cerebral edema 06/11/2018    Pulmonary nodule 06/11/2018    Esophageal dysphagia 06/11/2018    Mixed hyperlipidemia 06/11/2018    Embolic stroke involving left middle cerebral artery 06/10/2018    Received tissue plasminogen activator (t-PA) less than 24 hours prior to arrival 06/10/2018    Elevated serum creatinine 06/08/2018    Debility 06/08/2018    SOB (shortness of breath) 06/07/2018    COPD exacerbation 06/07/2018    Essential hypertension 06/07/2018    Type 2 diabetes mellitus, with long-term current use of insulin 06/07/2018    Pulmonary edema 06/07/2018        Plan:     Acute Hypoxic and Hypercapnic Respiratory Failure  - Saturating 94% on 5L; however, concerned for persistent increased WOB on BIPAP; at time of interview, patient requesting to transition back to NC; saturating 98% on 3L NC  - still with increased WOB but able to speak in close to full sentences; states breathing feels "much better at this time  - Hx, anasarca and rales on PE, CXR and BNP c/w pulmonary edema secondary to CHF; ran out of lasix > 1 mo ago  - continuing NC during the day and BIPAP qhs    Acute on Chronic Diastolic HF  - Admission to [facility] in [place] in 11/2018 with similar presentation; TTE at that time demonstrating grade 2 diastolic dysfunction; pulm artery pressure of 62; discharged on lasix 40 BID; patient states ran out within first month after discharge (never received/was able to get refills)  - s/p 80 mg IV lasix x 1 in ED; nurse in ED states two bed pads "soaked" after administration  - Hx, anasarca and rales on PE, CXR and BNP c/w pulmonary edema secondary to CHF  - Davis inserted for strict monitoring of I/O  - Daily weights  - Continuing 80 mg IV lasix q8H x 3 additional administrations  - Rpt TTE in AM     NSTEMI  - consistent with " demand ischemia secondary to acute heart failure exacerbation  - Troponin of 0.03 in ED; Rpt pending  - denies CP; will continue to monitor     Hypoattenuation in the Left inferior frontal lobe on CT c/w age-indeterminate infarc in June 2018  - unable to perform MRI at the time due to pacemaker  - Evaluated and TPA-ed for MCA stroke  - reports primary deficit post-CVA is memory loss    CKD 3a secondary to Diabetic Nephropathy   - Cr. 1.3; at baseline Cr of 1.3- 1.5  - Continuing to monitor    T2DM with Nephropathy  - A1c of 6.6 in Nov. 2018; rpt pending  - POCT BG and low-dose SSI initiated    Dysuria  - UA and U cx pending      HTN  - systolic BP elevated from 150s to 180s since admission  - s/p carvedilol 12.5 in ED  - restarting prior (current?) medications of Amlodipine 10 and Hydralazine 25 TID as well as carvedilol 25 BID    HLD  - continuing lipitor 40   - lipid panel pending     Morbid obesity  - BMI 48   - will  and discuss resources and treatment options prior to discharge    Deconditioned/Debility  - Consulting PT/OT for evaluation and treatment     Hx of cervical cancer?  - Per patient, informed in a letter that she had cervical cancer in fall 2018 at an OBGYN visit; no further documentation pertaining to this found on initial chart review; states never followed up thereafter  - Reports continuing to have intermittent vaginal bleeding and pain in this area  - Will consult OBGYN in AM for evaluation     Hx of MI and pacemaker placement  - prior documentation references placement in or prior to 2012  - no other information at this time  - will attempt to obtain more information from daughter and other family    Hx of Pulmonary nodule  -  Right upper lobe 5 mm solid pulmonary nodule on CTA in June 2018    Hx of Severe Depression  - px euthymic appearing during interview; will defer further discussion of mood with improvement in respiratory status  - per current medication list, not currently  receiving anti-depressant; hx of tx with wellbutrin    Hx of Stenosis of SMA, Mesenteric and Renal arteries on prior imaging  - denies symptoms at this time; will continue to monitor     Ppx: Heparin  Diet: Cardiac  Dispo: pending further respiratory improvement and diuresis  Code: Favian Kenney MD  [facility] Medicine/Pediatrics HO-2    [facility] Medicine Hospitalist Pager numbers:   [facility] Hospitalist Medicine Team A (Cori/Janine):          [phone]  [facility] Hospitalist Medicine Team B (Narcisa/Daphnie):        [phone]

## 2019-01-21 NOTE — ED NOTES
TOM, ED TECH AT BEDSIDE PREPARING TO TRANSPORT PT TO FLOOR. SR NOTED ON CARDIAC MONITOR. RESPIRATIONS EVEN AND UNLABORED. PT TOLERATING 3 LITERS O2 PER NASAL CANNULA WELL. NO ACUTE DISTRESS NOTED AT THIS TIME.

## 2019-01-21 NOTE — ED NOTES
PT'S ASSIGNED ICU ROOM NO LONGER AVAILABLE. WILL HOLD PT IN ED AT THIS TIME. PT AND FAMILY UPDATED WITH POC. V/U.

## 2019-01-21 NOTE — PLAN OF CARE
01/21/19 1629   Post-Acute Status   Post-Acute Authorization Placement  (snf)   Post-Acute Placement Status Referrals Sent  (The sw faxed the pt's info to [facility] and [facility] snf's)

## 2019-01-21 NOTE — PROGRESS NOTES
The Sw faxed the pt's info to [facility] and [facility] via [employer].

## 2019-01-21 NOTE — PLAN OF CARE
Problem: Adult Inpatient Plan of Care  Goal: Plan of Care Review  Outcome: Ongoing (interventions implemented as appropriate)  2207H Received patient from ED per stretcher; awake and orientedx4. Georgian speaking and speaks little English. PCT Minh, friend and Daughter serve as . Care plan explained and verbalized understanding. VIP care model introduced. iPad offered, refused. On oxygen 3lpm/nasal cannula, O2 saturation 100%. Hooked to heart monitor running Paced Rhythm at 80s. IV site to right antecubital 20G and right hand 22G, flushed and saline locked. Instructed on low salt, low fat diet. Instructed on fall precaution. Fall risk contract signed. Ambulates tot he bathroom with assist. Bed in lowest position, bed alarms on, call light within reach and instructed to call for help when needed. Daughter went home and will come back tomorrow. Daughter verbalized that she would like her mother to be placed in a Nursing Home because patient is non-compliant with her home medications and doctor's follow-up. Will continue  to monitor.    Hooked to BiPap by Repiratory therapist, tolerating well. Due medications given.

## 2019-01-21 NOTE — PT/OT/SLP EVAL
Occupational Therapy   Evaluation    Name: Franklin Araya  MRN: [id number]  Admitting Diagnosis:  Acute on chronic respiratory failure with hypoxia and hypercapnia      Recommendations:     Discharge Recommendations: nursing facility, skilled  Discharge Equipment Recommendations:  (TBD pending progress)  Barriers to discharge:  Decreased caregiver support    Assessment:     Franklin Araya is a 72 y.o. female with a medical diagnosis of Acute on chronic respiratory failure with hypoxia and hypercapnia.  She presents with deconditioning. Performance deficits affecting function: weakness, impaired endurance, gait instability, impaired functional mobilty, impaired cognition, decreased safety awareness, impaired coordination, impaired cardiopulmonary response to activity, impaired fine motor, pain, decreased coordination, impaired sensation, impaired balance, decreased upper extremity function, decreased ROM, decreased lower extremity function, impaired self care skills.      Rehab Prognosis: Fair; patient would benefit from acute skilled OT services to address these deficits and reach maximum level of function.       Plan:     Patient to be seen 5 x/week to address the above listed problems via self-care/home management, therapeutic activities, therapeutic exercises  · Plan of Care Expires: 02/21/19  · Plan of Care Reviewed with: patient    Subjective     Chief Complaint: Pt c/o abdominal pain.  Patient/Family Comments/goals: Return to PLOF.     Occupational Profile:  Living Environment: Pt lives with her daughter in an apartment, 0STE, tub/shower combo. See case management/ social work notes for more information. According to their notes pt's daughter would like her mother to be discharged to detention [facility]).  Previous level of function: Pt required assistance from daughter with all ADLs and states she was able to walk short distances such as to the bathroom.  Equipment Used at Home:  walker, rolling,  oxygen, bedside commode  Assistance upon Discharge: Family, possibly limited    Pain/Comfort:  Pain Rating 1: (did not rate)  Location - Orientation 1: lower  Location 1: abdomen  Pain Addressed 1: Reposition, Distraction, Cessation of Activity, Nurse notified  Pain Rating Post-Intervention 1: (did not rate)    Patients cultural, spiritual, Baptism conflicts given the current situation:      Objective:     Communicated with: Nsg prior to session.  Patient found HOB elevated with bed alarm, telemetry, oxygen upon OT entry to room.    General Precautions: Standard, fall, respiratory   Orthopedic Precautions:N/A   Braces: N/A     Occupational Performance:    Bed Mobility:    · Patient completed Rolling/Turning to Left with  moderate assistance  · Patient completed Supine to Sit with moderate assistance  · Patient completed Sit to Supine with moderate assistance    Functional Mobility/Transfers:  · Patient completed Sit <> Stand Transfer with minimum assistance  with  rolling walker   · Patient completed Bed <> Chair Transfer using Step Transfer technique with contact guard assistance with rolling walker  · Patient completed Toilet Transfer Step Transfer technique with contact guard assistance with  rolling walker  · Functional Mobility: Fair to Fair+ dynamic seated and standing balance.    Activities of Daily Living:  · Upper Body Dressing: moderate assistance to don gown as robe  · Lower Body Dressing: total assistance to don B socks  · Toileting: moderate assistance and maximal assistance unable to assist in hygiene    Cognitive/Visual Perceptual:  Cognitive/Psychosocial Skills:  -       Follows Commands/attention:Inattentive and Easily distracted  -       Communication: dysarthria and but limited ability to assess 2/2 pt Pakistani speaking. Emeilna  required pt to repeat several times 2/2 inability to understand replies  -       Memory: Difficult to assess; pt answering questions appropriately for the most  part but often required increasd physical and v/c to attend to speakers  -       Safety awareness/insight to disability: impaired   -       Mood/Affect/Coping skills/emotional control: Lethargic    Physical Exam:  Postural examination/scapula alignment:    -       Rounded shoulders  -       Forward head  Motor Planning:    -       WFL  Upper Extremity Range of Motion:   BUE limited AROM shoulder flexion RUE>LUE 2/2 pain  Upper Extremity Strength:  Grossly 3 to 3+/5   Strength:  BUE 3 to 3+/5  Gross motor coordination:   WFL    AMPAC 6 Click ADL:  AMPAC Total Score: 13    Treatment & Education  Pt educated on role of OT and POC.   Pt performing skills as listed above.  Pt breathing through mouth and SOB upon entrance. O2 sats 94% seated EOB.  Pt educated on use of deep breathing exercises to breathe in through nose. Pt lethargic throughout session.  Education:    Patient left up in chair with all lines intact, call button in reach, chair alarm on and nsg notified    GOALS:   Multidisciplinary Problems     Occupational Therapy Goals        Problem: Occupational Therapy Goal    Goal Priority Disciplines Outcome Interventions   Occupational Therapy Goal     OT, PT/OT Ongoing (interventions implemented as appropriate)    Description:  Goals to be met by: 02/21/2019      Patient will increase functional independence with ADLs by performing:    UE Dressing with Modified [place].  LE Dressing with Modified [place].  Grooming while standing with Set-up Assistance.  Toileting from toilet with Modified [place] for hygiene and clothing management.   Sitting at edge of bed x10 minutes with Supervision.  Supine to sit with Modified [place].  Toilet transfer to toilet with Supervision.  Increased functional strength to WFL for self care.  Upper extremity exercise program x10 reps per handout, with assistance as needed.                      History:     Past Medical History:   Diagnosis Date    Asthma      "Coronary artery disease     pacemaker    Diabetes mellitus     Hypertension     MI, old     Renal disorder          Past Surgical History:   Procedure Laterality Date    CARDIAC PACEMAKER PLACEMENT      CARDIAC PACEMAKER PLACEMENT      CARDIAC PACEMAKER PLACEMENT      HYSTERECTOMY      SHOULDER SURGERY         Clinical Decision Makin.  OT Mod:  "Pt evaluation falls under moderate complexity for evaluation coding due to identification of 3-5 performance deficits noted as stated above. Eval required Min/Mod assistance to complete on this date and detailed assessment(s) were utilized. Moreover, an expanded review of history and occupational profile obtained with additional review of cognitive, physical and psychosocial hx."     Time Tracking:     OT Date of Treatment: 19  OT Start Time: 955  OT Stop Time:   OT Total Time (min): 27 min Co Tx PT    Billable Minutes:Evaluation 10    Razia Gonzales OT  2019    "

## 2019-01-21 NOTE — ED NOTES
PT URINATED AND MISSED BED PAN. UNABLE TO ESTIMATE URINE OUTPUT. ADMIT TEAM AWARE. AWAITING MAIN ORDERS.

## 2019-01-22 LAB
ANION GAP SERPL CALC-SCNC: 10 MMOL/L
AORTIC ROOT ANNULUS: 3.21 CM
AORTIC VALVE CUSP SEPERATION: 1.9 CM
AV INDEX (PROSTH): 0.61
AV MEAN GRADIENT: 4.66 MMHG
AV PEAK GRADIENT: 7.84 MMHG
AV VALVE AREA: 1.68 CM2
AV VELOCITY RATIO: 0.71
BACTERIA UR CULT: NO GROWTH
BSA FOR ECHO PROCEDURE: 2.23 M2
BUN SERPL-MCNC: 36 MG/DL
CALCIUM SERPL-MCNC: 8.5 MG/DL
CHLORIDE SERPL-SCNC: 103 MMOL/L
CO2 SERPL-SCNC: 26 MMOL/L
CREAT SERPL-MCNC: 1.5 MG/DL
CV ECHO LV RWT: 0.45 CM
DOP CALC AO PEAK VEL: 1.4 M/S
DOP CALC AO VTI: 36.24 CM
DOP CALC LVOT AREA: 2.75 CM2
DOP CALC LVOT DIAMETER: 1.87 CM
DOP CALC LVOT PEAK VEL: 0.99 M/S
DOP CALC LVOT STROKE VOLUME: 60.94 CM3
DOP CALCLVOT PEAK VEL VTI: 22.2 CM
E WAVE DECELERATION TIME: 150.26 MSEC
E/A RATIO: 1.13
ECHO LV POSTERIOR WALL: 1.24 CM (ref 0.6–1.1)
EST. GFR  (AFRICAN AMERICAN): 40 ML/MIN/1.73 M^2
EST. GFR  (NON AFRICAN AMERICAN): 35 ML/MIN/1.73 M^2
FRACTIONAL SHORTENING: 24 % (ref 28–44)
GLUCOSE SERPL-MCNC: 170 MG/DL
INTERVENTRICULAR SEPTUM: 1.71 CM (ref 0.6–1.1)
IVRT: 0.06 MSEC
LEFT ATRIUM SIZE: 4.16 CM
LEFT INTERNAL DIMENSION IN SYSTOLE: 4.22 CM (ref 2.1–4)
LEFT VENTRICLE DIASTOLIC VOLUME INDEX: 72.05 ML/M2
LEFT VENTRICLE DIASTOLIC VOLUME: 151.42 ML
LEFT VENTRICLE MASS INDEX: 176.2 G/M2
LEFT VENTRICLE SYSTOLIC VOLUME INDEX: 37.9 ML/M2
LEFT VENTRICLE SYSTOLIC VOLUME: 79.57 ML
LEFT VENTRICULAR INTERNAL DIMENSION IN DIASTOLE: 5.56 CM (ref 3.5–6)
LEFT VENTRICULAR MASS: 370.35 G
MV PEAK A VEL: 1.19 M/S
MV PEAK E VEL: 1.35 M/S
PISA TR MAX VEL: 2.7 M/S
POCT GLUCOSE: 132 MG/DL (ref 70–110)
POCT GLUCOSE: 162 MG/DL (ref 70–110)
POCT GLUCOSE: 166 MG/DL (ref 70–110)
POCT GLUCOSE: 183 MG/DL (ref 70–110)
POTASSIUM SERPL-SCNC: 4.7 MMOL/L
PULM VEIN S/D RATIO: 0.68
PV PEAK D VEL: 0.53 M/S
PV PEAK S VEL: 0.36 M/S
PV PEAK VELOCITY: 0.96 CM/S
RA MAJOR: 5.58 CM
RA PRESSURE: 15 MMHG
RIGHT VENTRICULAR END-DIASTOLIC DIMENSION: 2.8 CM
SODIUM SERPL-SCNC: 139 MMOL/L
TR MAX PG: 29.16 MMHG
TV REST PULMONARY ARTERY PRESSURE: 44 MMHG

## 2019-01-22 PROCEDURE — 94660 CPAP INITIATION&MGMT: CPT

## 2019-01-22 PROCEDURE — A4216 STERILE WATER/SALINE, 10 ML: HCPCS | Performed by: STUDENT IN AN ORGANIZED HEALTH CARE EDUCATION/TRAINING PROGRAM

## 2019-01-22 PROCEDURE — 80048 BASIC METABOLIC PNL TOTAL CA: CPT

## 2019-01-22 PROCEDURE — 27000221 HC OXYGEN, UP TO 24 HOURS

## 2019-01-22 PROCEDURE — 97530 THERAPEUTIC ACTIVITIES: CPT

## 2019-01-22 PROCEDURE — 25000003 PHARM REV CODE 250: Performed by: STUDENT IN AN ORGANIZED HEALTH CARE EDUCATION/TRAINING PROGRAM

## 2019-01-22 PROCEDURE — 97116 GAIT TRAINING THERAPY: CPT

## 2019-01-22 PROCEDURE — 12000002 HC ACUTE/MED SURGE SEMI-PRIVATE ROOM

## 2019-01-22 PROCEDURE — 63600175 PHARM REV CODE 636 W HCPCS: Performed by: STUDENT IN AN ORGANIZED HEALTH CARE EDUCATION/TRAINING PROGRAM

## 2019-01-22 PROCEDURE — 94761 N-INVAS EAR/PLS OXIMETRY MLT: CPT

## 2019-01-22 PROCEDURE — 36415 COLL VENOUS BLD VENIPUNCTURE: CPT

## 2019-01-22 RX ORDER — GUAIFENESIN 600 MG/1
600 TABLET, EXTENDED RELEASE ORAL 2 TIMES DAILY
Status: DISCONTINUED | OUTPATIENT
Start: 2019-01-22 | End: 2019-01-25 | Stop reason: HOSPADM

## 2019-01-22 RX ORDER — FUROSEMIDE 10 MG/ML
80 INJECTION INTRAMUSCULAR; INTRAVENOUS EVERY 8 HOURS
Status: COMPLETED | OUTPATIENT
Start: 2019-01-22 | End: 2019-01-24

## 2019-01-22 RX ADMIN — HEPARIN SODIUM 7500 UNITS: 5000 INJECTION, SOLUTION INTRAVENOUS; SUBCUTANEOUS at 05:01

## 2019-01-22 RX ADMIN — GUAIFENESIN 600 MG: 600 TABLET, EXTENDED RELEASE ORAL at 09:01

## 2019-01-22 RX ADMIN — ASPIRIN 81 MG 81 MG: 81 TABLET ORAL at 09:01

## 2019-01-22 RX ADMIN — Medication 3 ML: at 10:01

## 2019-01-22 RX ADMIN — CARVEDILOL 25 MG: 25 TABLET, FILM COATED ORAL at 06:01

## 2019-01-22 RX ADMIN — Medication 3 ML: at 05:01

## 2019-01-22 RX ADMIN — CARVEDILOL 25 MG: 25 TABLET, FILM COATED ORAL at 09:01

## 2019-01-22 RX ADMIN — HYDROCODONE BITARTRATE AND ACETAMINOPHEN 1 TABLET: 5; 325 TABLET ORAL at 09:01

## 2019-01-22 RX ADMIN — Medication 3 ML: at 03:01

## 2019-01-22 RX ADMIN — HYDROCODONE BITARTRATE AND ACETAMINOPHEN 1 TABLET: 5; 325 TABLET ORAL at 12:01

## 2019-01-22 RX ADMIN — FUROSEMIDE 80 MG: 10 INJECTION, SOLUTION INTRAMUSCULAR; INTRAVENOUS at 04:01

## 2019-01-22 RX ADMIN — HEPARIN SODIUM 7500 UNITS: 5000 INJECTION, SOLUTION INTRAVENOUS; SUBCUTANEOUS at 02:01

## 2019-01-22 RX ADMIN — ATORVASTATIN CALCIUM 40 MG: 40 TABLET, FILM COATED ORAL at 09:01

## 2019-01-22 RX ADMIN — FUROSEMIDE 80 MG: 10 INJECTION, SOLUTION INTRAMUSCULAR; INTRAVENOUS at 09:01

## 2019-01-22 RX ADMIN — HEPARIN SODIUM 7500 UNITS: 5000 INJECTION, SOLUTION INTRAVENOUS; SUBCUTANEOUS at 09:01

## 2019-01-22 NOTE — PLAN OF CARE
Problem: Adult Inpatient Plan of Care  Goal: Plan of Care Review  Outcome: Ongoing (interventions implemented as appropriate)  Patient found on 2 LPM NC, 02 sats notated. Will continue to monitor.

## 2019-01-22 NOTE — PROGRESS NOTES
The Sw spoke to Fatuma at [facility] and she is aware the pt will require snf at d/c. She will watch for the snf request from the accepting snf. The Sw spoke to Alissa at [facility] but she hasn't reviewed the pt yet but states she will and she will inform the Sw of the decision.

## 2019-01-22 NOTE — TELEPHONE ENCOUNTER
----- Message from Maryuri Magana MA sent at 1/21/2019  8:50 AM CST -----  [facility] [phone]  Please advise  ----- Message -----  From: Yuridia Burris  Sent: 1/21/2019   8:33 AM  To: Michelet SORTO Staff    CONSULT    Room /bed number: 454  Referring MD: Dr. Kenney  Diagnosis: history of cervical cancer and never received treatment

## 2019-01-22 NOTE — PLAN OF CARE
01/22/19 4199   Post-Acute Status   Post-Acute Placement Status Additional Clinical Requested  (the  faxed updated info to Jerry)

## 2019-01-22 NOTE — PLAN OF CARE
Problem: Occupational Therapy Goal  Goal: Occupational Therapy Goal  Goals to be met by: 02/21/2019      Patient will increase functional independence with ADLs by performing:    UE Dressing with Modified [place].  LE Dressing with Modified [place].  Grooming while standing with Set-up Assistance.  Toileting from toilet with Modified [place] for hygiene and clothing management.   Sitting at edge of bed x10 minutes with Supervision.  Supine to sit with Modified [place].  Toilet transfer to toilet with Supervision.  Increased functional strength to WFL for self care.  Upper extremity exercise program x10 reps per handout, with assistance as needed.     Outcome: Ongoing (interventions implemented as appropriate)  Patient making great progress towards goals. Improved activity tolerance noted this date. Will benefit from continued skilled OT to address functional deficits.

## 2019-01-22 NOTE — PLAN OF CARE
01/22/19 1635   Post-Acute Status   Post-Acute Placement Status Pending Medical Review  (Jerry is currently reviewing the pt. )

## 2019-01-22 NOTE — PROGRESS NOTES
[facility] Hospital Medicine Progress Note    Primary Team: [facility] Hospitalist Team A  Attending Physician: Regina Mehta MD  Resident: Pablito  Intern: Epifanio    Subjective:      Patient sitting up, smiling, eating breakfast. No increased WOB appreciable.     Patient's daughter at bedside translating, states breathing is much better. UO of 2900 over last 24 hours.      Objective:     Last 24 Hour Vital Signs:  BP  Min: 137/81  Max: 181/78  Temp  Av.2 °F (36.2 °C)  Min: 96.4 °F (35.8 °C)  Max: 98.2 °F (36.8 °C)  Pulse  Av  Min: 60  Max: 68  Resp  Av.7  Min: 18  Max: 24  SpO2  Av.9 %  Min: 95 %  Max: 100 %  Weight  Av.4 kg (263 lb 3.7 oz)  Min: 119.4 kg (263 lb 3.7 oz)  Max: 119.4 kg (263 lb 3.7 oz)  I/O last 3 completed shifts:  In: 905 [P.O.:905]  Out: 4445 [Urine:4445]    Physical Examination:  General: Very obese female, NAD, on 1L NC   HENT: NCAT, nares patent with NC, PERRLA, EOMI, no pharyngeal erythema   Cardio: RRR, no MGR, distal pulses not palpable due to severe edema  Pulm: CTABL, mild end exp wheezing , patient satting well on 1L NC, weaned off bipap, no respiratory distress at this time   Abd: very obese, faustino to palpation subumbilical region, positive fluid wave  Ext: impressive edema (skin wrinkling minor but present suggesting small improvement), symmetric, TTP   Skin: warm and dry  Psych: appropriate mood and affect   Neuro: AAOx3, no focal deficits      Laboratory:  Laboratory Data Reviewed: yes  Pertinent Findings:  Trop: 0.029 > 0.034  BNP:    A1C: 7.8     Microbiology Data Reviewed: yes  Pertinent Findings:  None         Current Medications:     Infusions:       Scheduled:   aspirin  81 mg Oral Daily    atorvastatin  40 mg Oral Daily    carvedilol  25 mg Oral BID WM    heparin (porcine)  7,500 Units Subcutaneous Q8H    polyethylene glycol  17 g Oral Daily    sodium chloride 0.9%  3 mL Intravenous Q8H        PRN:  dextrose 50%, dextrose 50%, glucagon (human  recombinant), glucose, glucose, hydrALAZINE, HYDROcodone-acetaminophen, insulin aspart U-100, ondansetron        Assessment:     Franklin Araya is a 72 y.o.female with  Patient Active Problem List    Diagnosis Date Noted    Acute on chronic respiratory failure with hypoxia and hypercapnia 01/21/2019    Uncontrolled hypertension 01/21/2019    Elevated troponin 01/21/2019    Physical deconditioning 01/21/2019    Type 2 diabetes mellitus with stage 3 chronic kidney disease, with long-term current use of insulin 01/21/2019    Morbid obesity 01/21/2019    History of stroke 01/21/2019    Hyperlipidemia associated with type 2 diabetes mellitus     Noncompliance with medication regimen     Acute on chronic diastolic congestive heart failure 01/20/2019    Noncompliance 06/14/2018    Vaginal bleeding 06/13/2018    Cytotoxic cerebral edema 06/11/2018    Pulmonary nodule 06/11/2018    Esophageal dysphagia 06/11/2018    Mixed hyperlipidemia 06/11/2018    Embolic stroke involving left middle cerebral artery 06/10/2018    Received tissue plasminogen activator (t-PA) less than 24 hours prior to arrival 06/10/2018    Elevated serum creatinine 06/08/2018    Debility 06/08/2018    SOB (shortness of breath) 06/07/2018    COPD exacerbation 06/07/2018    Essential hypertension 06/07/2018    Type 2 diabetes mellitus, with long-term current use of insulin 06/07/2018    Pulmonary edema 06/07/2018        Plan:     Acute on chronic Hypoxic and Hypercapnic Respiratory Failure, RESOLVED  - on 1 L home O2 via NC; will perform trial of RA to determine if continued need for supplemental 02  - continued end-exp wheezing but improved from yesterday  - Hx, anasarca and rales on PE, CXR and BNP c/w pulmonary edema secondary to CHF; ran out of lasix > 1 mo ago with likely component of COPD as well   - continuing NC during the day and BIPAP qhs at this time     Acute on Chronic Diastolic HF  - Admission to [facility] in [place] in 11/2018 with similar presentation; TTE at that time demonstrating grade 2 diastolic dysfunction; pulm artery pressure of 62; discharged on lasix 40 BID; patient states ran out within first month after discharge (never received/was able to get refills); rpt TTE with normal EF, mild LAE, mild-mod TR, elevated PA pressure  - Daily weights  - Continuing 80 mg IV lasix q8H x 3   - responding to diuresis with appropriate output; UO of 2900 over last 24 hours    Pulmonary HTN  - TTE this admission demonstrating PA pressure of 44   - stable on 1-2 L NC  - Will continue to follow up with her outpx Cardiologist on discharge     NSTEMI  - consistent with demand ischemia secondary to acute heart failure exacerbation  - Troponin of 0.029 in ED; Rpt 0.034  - denies CP; will continue to monitor      Hypoattenuation in the Left inferior frontal lobe on CT c/w age-indeterminate infarc in June 2018  - unable to perform MRI at the time due to pacemaker  - Evaluated and TPA-ed for MCA stroke  - reports primary deficit post-CVA is memory loss     CKD 3a secondary to Diabetic Nephropathy   - Cr. 1.3; at baseline Cr of 1.3- 1.5; Cr 1.5 this am  - Continuing to monitor     T2DM with Nephropathy  - A1c of 6.6 in Nov. 2018; rpt 7.8  - POCT BG and low-dose SSI initiated       Dysuria  - UA negative and U cx NGTD     HTN  - systolic BP elevated to 140s to 160s; improved since admission  - s/p carvedilol 12.5 in ED  - restarting prior (current?) medications of Amlodipine 10 and Hydralazine 25 TID as well as carvedilol 25 BID     HLD  - continuing lipitor 40   - lipid panel with chol 108, HDL 35, LDL, 60.6      Morbid obesity  - BMI 48   - will  and discuss resources and treatment options prior to discharge     Deconditioned/Debility  - Consulting PT/OT for evaluation and treatment      Hx of cervical cancer?  - Per patient, informed in a letter that she had cervical cancer in fall 2018 at an OBGYN visit; no further  documentation pertaining to this found on initial chart review; states never followed up thereafter  - Reports continuing to have intermittent vaginal bleeding and pain in this area  - will set up referral for OBGYN on dc      Hx of MI and pacemaker placement  - prior documentation references placement in or prior to 2012  - no other information at this time  - will attempt to obtain more information from daughter and other family     Hx of Pulmonary nodule  -  Right upper lobe 5 mm solid pulmonary nodule on CTA in June 2018     Hx of Severe Depression  - px euthymic appearing during interview; will defer further discussion of mood with improvement in respiratory status  - per current medication list, not currently receiving anti-depressant; hx of tx with wellbutrin 150 BID     Hx of Stenosis of SMA, 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

## 2019-01-22 NOTE — PROGRESS NOTES
VN note: VN cued into pt's room for introduction. VN informed pt that VN would be working closely along side bedside nurse, PCT, and the rest of care team and making rounds throughout the shift. Pt verbalized understanding. Allowed time for questions. Patient denies any pain or discomfort at this time.  Patient C/O stomach pain.  Patient just received pain medication at 0921.  VN will continue to be available to patient and intervene prn.        01/22/19 1000   Type of Frequent Check   Type Patient Rounds  (VN rounding)   Safety/Activity   Patient Rounds visualized patient;ID band on;call light in reach   Safety Promotion/Fall Prevention in recliner, wheels locked   Safety Precautions emergency equipment at bedside   Positioning   Body Position other (see comments)  (up in chair)   Pain/Comfort/Sleep   Preferred Pain Scale number (Numeric Rating Pain Scale)   Comfort/Acceptable Pain Level 0   Pain Rating (0-10): Rest 4   Pain Rating (0-10): Activity 4       Cardiac/Telemetry Details / Alarms   Cardiac/Telemetry Monitor On Yes   Cardiac/Telemetry Audible Yes   Cardiac/Telemetry Alarms Set Yes   Assessments (Pre/Post)   Level of Consciousness (AVPU) alert

## 2019-01-22 NOTE — PLAN OF CARE
Problem: Physical Therapy Goal  Goal: Physical Therapy Goal  Goals to be met by: 19    Patient will increase functional independence with mobility by performin) Supine<>Sit with supervision.   2) Sit <>Stand with supervision and use of RW  3) Bed <>Chair with supervision and use of RW  4) Pt to ambulate at least 150 feet with supervision and use of RW  5) Pt to perform BLE X10 mod I with handout.        Outcome: Ongoing (interventions implemented as appropriate)  Pt making good progress with functional mobility and activity tolerance this date as she was able to ambulate 30 ft and 65 ft with RW and CGA/SBA. Pt on 2 L/min during session. Recommending SNF placement upon d/c. dme needs: RW and BSC.

## 2019-01-22 NOTE — PT/OT/SLP PROGRESS
Physical Therapy Treatment    Patient Name:  Franklin Araya   MRN:  [id number]    Recommendations:     Discharge Recommendations:  nursing facility, skilled   Discharge Equipment Recommendations: walker, rolling   Barriers to discharge: None    Assessment:     Franklin Araya is a 72 y.o. female admitted with a medical diagnosis of Acute on chronic respiratory failure with hypoxia and hypercapnia.  She presents with the following impairments/functional limitations:  weakness, impaired endurance, impaired self care skills, impaired functional mobilty, gait instability, impaired balance, impaired cognition, decreased coordination, decreased lower extremity function, pain, decreased ROM. Pt making good progress with functional mobility and activity tolerance this date as she was able to ambulate 30 ft and 65 ft with RW and CGA/SBA. Pt on 2 L/min during session. Recommending SNF placement upon d/c. dme needs: RW and BSC.    Rehab Prognosis: Good; patient would benefit from acute skilled PT services to address these deficits and reach maximum level of function.    Recent Surgery: * No surgery found *      Plan:     During this hospitalization, patient to be seen 6 x/week to address the identified rehab impairments via gait training, therapeutic activities, therapeutic exercises and progress toward the following goals:    · Plan of Care Expires:  02/21/19    Subjective     Chief Complaint: reports LLE pain during supine>sit but no other c/o pain throughout session  Patient/Family Comments/goals: Pt is [place] speaking, OT present during session and completed [place]-English translation throughout session.  Pain/Comfort:  · Pain Rating 1: (did not rate but reported LLE pain)  · Location - Side 1: Left  · Location - Orientation 1: generalized  · Location 1: leg  · Pain Addressed 1: Reposition, Distraction  · Pain Rating Post-Intervention 1: (no complaints)      Objective:     Communicated with MCKAY Platt prior  to session.  Patient found all lines intact bed alarm, oxygen, telemetry  upon PT entry to room.     General Precautions: Standard, fall, respiratory   Orthopedic Precautions:N/A   Braces: N/A     Functional Mobility:  · Bed Mobility:     · Scooting: stand by assistance  · Supine to Sit: stand by assistance  · Sit to Supine: minimum assistance and to raise LLE into bed  · Transfers:     · Sit to Stand:  contact guard assistance with rolling walker  · Gait: 30 ft and 65 ft with RW and CGA - cues for pacing and pursed lip breathing.      AM-PAC 6 CLICK MOBILITY  Turning over in bed (including adjusting bedclothes, sheets and blankets)?: 3  Sitting down on and standing up from a chair with arms (e.g., wheelchair, bedside commode, etc.): 3  Moving from lying on back to sitting on the side of the bed?: 3  Moving to and from a bed to a chair (including a wheelchair)?: 3  Need to walk in hospital room?: 3  Climbing 3-5 steps with a railing?: 2  Basic Mobility Total Score: 17       Therapeutic Activities and Exercises:  Completed 2 bouts of gait as reported above.  Pt on 2 L/min and SaO2 was 95% at rest, placed on RA and SaO2 was 89-95% at rest.  Ambulated initially on RA and SaO2 decreased to 84%, placed 2 L/min back on and SaO2 was 87% or higher but unsure of accuracy as pulse ox was getting intermittent readings.  Returned to supine with HOB elevated.    Patient left HOB elevated with all lines intact, call button in reach, bed alarm on and RN notified..    GOALS:   Multidisciplinary Problems     Physical Therapy Goals        Problem: Physical Therapy Goal    Goal Priority Disciplines Outcome Goal Variances Interventions   Physical Therapy Goal     PT, PT/OT Ongoing (interventions implemented as appropriate)     Description:  Goals to be met by: 19    Patient will increase functional independence with mobility by performin) Supine<>Sit with supervision.   2) Sit <>Stand with supervision and use of RW  3) Bed  <>Chair with supervision and use of RW  4) Pt to ambulate at least 150 feet with supervision and use of RW  5) Pt to perform BLE X10 mod I with handout.                         Time Tracking:     PT Received On: 01/22/19  PT Start Time: 1454     PT Stop Time: 1527  PT Total Time (min): 33 min with OT    Billable Minutes: Gait Training 16    Treatment Type: Treatment  PT/PTA: PT     PTA Visit Number: 0     Sonam Eng, PT  01/22/2019

## 2019-01-22 NOTE — TELEPHONE ENCOUNTER
Spoke to Anjelica on 4th floor. Said consult was placed on 1/21 but must have been removed  Yesterday.  She called to notify Gyn of consult and Roro took the call and sent consult incorrectly to Dr. Tafoya .  The consult should have gone to Dr. Thorpe who was on call on 1/21/19. Message was never sent to Dr. Thorpe but the next day was sent to me. There is no order for a gyn consult on the chart currently so pt apparently doesn't need to be seen by us as an inpt.  Resident Dr Kenney's note says pt can f/u outpt for a pap and eval of h/o Cervical cancer. Chart reviewed and pt has some vb and a fibroid on uterus and a mildly enlarged rt ovary with some minimal ascites on ct SCAN. Has extensive co morbidities and is not a surgical candidate  Jeane can you educate the staff on how inpt consults need to be handled so as to not delay pt care. Probably want the clerks  to know   to send the consult to the Dr. On Call that day the consult was placed.  Thanks

## 2019-01-22 NOTE — PT/OT/SLP PROGRESS
Occupational Therapy   Treatment    Name: Franklin Araya  MRN: [id number]  Admitting Diagnosis:  Acute on chronic respiratory failure with hypoxia and hypercapnia       Recommendations:     Discharge Recommendations: nursing facility, skilled  Discharge Equipment Recommendations:  walker, rolling(Would benefit from toileting tongs 2* body habitus)  Barriers to discharge:  Decreased caregiver support    Assessment:   Patient making great progress towards goals. Improved activity tolerance noted this date. Will benefit from continued skilled OT to address functional deficits.     Franklin Araya is a 72 y.o. female with a medical diagnosis of Acute on chronic respiratory failure with hypoxia and hypercapnia. Performance deficits affecting function are weakness, impaired endurance, impaired self care skills, impaired functional mobilty, gait instability, impaired balance, decreased upper extremity function, decreased lower extremity function, edema, impaired cardiopulmonary response to activity, decreased ROM, impaired skin.     Rehab Prognosis:  Good; patient would benefit from acute skilled OT services to address these deficits and reach maximum level of function.       Plan:     Patient to be seen 5 x/week to address the above listed problems via self-care/home management, therapeutic activities, therapeutic exercises  · Plan of Care Expires: 02/21/19  · Plan of Care Reviewed with: patient    Subjective     Pain/Comfort:  · Pain Rating 1: (pain in LLE, specifically knee, but did not rate)    Objective:     Communicated with: nurseGiulia prior to session.  Patient found left sidelying with bed alarm, telemetry, oxygen upon OT entry to room.    General Precautions: Standard, fall, respiratory   Orthopedic Precautions:N/A   Braces: N/A     Bed Mobility:    · Patient completed Scooting/Bridging with stand by assistance  · Patient completed Supine to Sit with stand by assistance, with side rail and  increased time/effort  · Patient completed Sit to Supine with minimum assistance and with leg lift     Functional Mobility/Transfers:  · Patient completed Sit <> Stand Transfer with contact guard assistance  with  rolling walker     Activities of Daily Living:  · Feeding:  modified independence    · Upper Body Dressing: contact guard assistance to lexis ekaterinawn    [facility] 6 Click ADL: 16    Treatment & Education:  Patient asleep on arrival, but easily arousable. Patient with bed mob as noted above. Increased effort noted to get EOB and reported LLE pain, but then subsided once EOB. Patient stood with CGA and ambulated in [place] with CGA using RW. Patient's O2 sats on 2L remained 92-96%. Patient returned to EOB for rest break; instructed in PLB. Patient then with second gait trial /s O2, but desat to 84%, so O2 was placed back on and patient came back up to 92-95%.     Patient left HOB elevated with all lines intact, call button in reach, bed alarm on and nurse notifiedEducation:      GOALS:   Multidisciplinary Problems     Occupational Therapy Goals        Problem: Occupational Therapy Goal    Goal Priority Disciplines Outcome Interventions   Occupational Therapy Goal     OT, PT/OT Ongoing (interventions implemented as appropriate)    Description:  Goals to be met by: 02/21/2019      Patient will increase functional independence with ADLs by performing:    UE Dressing with Modified [place].  LE Dressing with Modified [place].  Grooming while standing with Set-up Assistance.  Toileting from toilet with Modified [place] for hygiene and clothing management.   Sitting at edge of bed x10 minutes with Supervision.  Supine to sit with Modified [place].  Toilet transfer to toilet with Supervision.  Increased functional strength to WFL for self care.  Upper extremity exercise program x10 reps per handout, with assistance as needed.                      Time Tracking:     OT Date of Treatment: 01/22/19  OT Start  Time: 1454  OT Stop Time: 1527  OT Total Time (min): 33 mins co-tx with PT    Billable Minutes:Therapeutic Activity 17    OLIMPIA [place]/DAMIAN  1/22/2019

## 2019-01-22 NOTE — PT/OT/SLP PROGRESS
Physical Therapy      Patient Name:  Franklin Araya   MRN:  [id number]    Patient not seen today secondary to (pt just back in bed from up in chair most of day,refusal). Will follow-up tomorrow.    Amadeo Cardoso, PTA

## 2019-01-22 NOTE — TELEPHONE ENCOUNTER
Consults for hospital always go to doctor on call please forward or send to appropriate on call if not done yet

## 2019-01-22 NOTE — PLAN OF CARE
Problem: Adult Inpatient Plan of Care  Goal: Patient-Specific Goal (Individualization)  Patient in bed supine, appears to be asleep at this time. No noted distress during shift. Patient complained of chest a slight pain in her chest after getting up out of bed to use the bedside commode. Plan of care reviewed with patient and patient verbalized understanding. Safety measures in place, Side rails up x2, bed in lowest position, call be in reach, Will continue to monitor patient throughout  Shift.

## 2019-01-23 LAB
ANION GAP SERPL CALC-SCNC: 6 MMOL/L
BUN SERPL-MCNC: 36 MG/DL
CALCIUM SERPL-MCNC: 8.6 MG/DL
CHLORIDE SERPL-SCNC: 99 MMOL/L
CO2 SERPL-SCNC: 35 MMOL/L
CREAT SERPL-MCNC: 1.4 MG/DL
EST. GFR  (AFRICAN AMERICAN): 44 ML/MIN/1.73 M^2
EST. GFR  (NON AFRICAN AMERICAN): 38 ML/MIN/1.73 M^2
GLUCOSE SERPL-MCNC: 130 MG/DL
POCT GLUCOSE: 152 MG/DL (ref 70–110)
POCT GLUCOSE: 158 MG/DL (ref 70–110)
POCT GLUCOSE: 197 MG/DL (ref 70–110)
POCT GLUCOSE: 253 MG/DL (ref 70–110)
POTASSIUM SERPL-SCNC: 4 MMOL/L
SODIUM SERPL-SCNC: 140 MMOL/L
TB INDURATION 48 - 72 HR READ: 0 MM
TROPONIN I SERPL DL<=0.01 NG/ML-MCNC: 0.02 NG/ML

## 2019-01-23 PROCEDURE — 84484 ASSAY OF TROPONIN QUANT: CPT

## 2019-01-23 PROCEDURE — 63600175 PHARM REV CODE 636 W HCPCS: Performed by: STUDENT IN AN ORGANIZED HEALTH CARE EDUCATION/TRAINING PROGRAM

## 2019-01-23 PROCEDURE — 27000221 HC OXYGEN, UP TO 24 HOURS

## 2019-01-23 PROCEDURE — 80048 BASIC METABOLIC PNL TOTAL CA: CPT

## 2019-01-23 PROCEDURE — 97535 SELF CARE MNGMENT TRAINING: CPT

## 2019-01-23 PROCEDURE — 12000002 HC ACUTE/MED SURGE SEMI-PRIVATE ROOM

## 2019-01-23 PROCEDURE — 36415 COLL VENOUS BLD VENIPUNCTURE: CPT

## 2019-01-23 PROCEDURE — 25000003 PHARM REV CODE 250: Performed by: STUDENT IN AN ORGANIZED HEALTH CARE EDUCATION/TRAINING PROGRAM

## 2019-01-23 PROCEDURE — 93005 ELECTROCARDIOGRAM TRACING: CPT

## 2019-01-23 PROCEDURE — A4216 STERILE WATER/SALINE, 10 ML: HCPCS | Performed by: STUDENT IN AN ORGANIZED HEALTH CARE EDUCATION/TRAINING PROGRAM

## 2019-01-23 PROCEDURE — 94761 N-INVAS EAR/PLS OXIMETRY MLT: CPT

## 2019-01-23 RX ORDER — FUROSEMIDE 80 MG/1
80 TABLET ORAL 2 TIMES DAILY
Qty: 60 TABLET | Refills: 11 | Status: SHIPPED | OUTPATIENT
Start: 2019-01-23 | End: 2020-01-23

## 2019-01-23 RX ORDER — GLIPIZIDE 2.5 MG/1
5 TABLET, EXTENDED RELEASE ORAL
Qty: 180 TABLET | Refills: 3 | Status: SHIPPED | OUTPATIENT
Start: 2019-01-23 | End: 2020-01-23

## 2019-01-23 RX ORDER — NITROGLYCERIN 0.4 MG/1
0.4 TABLET SUBLINGUAL EVERY 5 MIN PRN
Status: DISCONTINUED | OUTPATIENT
Start: 2019-01-23 | End: 2019-01-25 | Stop reason: HOSPADM

## 2019-01-23 RX ADMIN — GUAIFENESIN 600 MG: 600 TABLET, EXTENDED RELEASE ORAL at 09:01

## 2019-01-23 RX ADMIN — FUROSEMIDE 80 MG: 10 INJECTION, SOLUTION INTRAMUSCULAR; INTRAVENOUS at 02:01

## 2019-01-23 RX ADMIN — HEPARIN SODIUM 7500 UNITS: 5000 INJECTION, SOLUTION INTRAVENOUS; SUBCUTANEOUS at 09:01

## 2019-01-23 RX ADMIN — HEPARIN SODIUM 7500 UNITS: 5000 INJECTION, SOLUTION INTRAVENOUS; SUBCUTANEOUS at 02:01

## 2019-01-23 RX ADMIN — FUROSEMIDE 80 MG: 10 INJECTION, SOLUTION INTRAMUSCULAR; INTRAVENOUS at 05:01

## 2019-01-23 RX ADMIN — Medication 3 ML: at 05:01

## 2019-01-23 RX ADMIN — ATORVASTATIN CALCIUM 40 MG: 40 TABLET, FILM COATED ORAL at 09:01

## 2019-01-23 RX ADMIN — ASPIRIN 81 MG 81 MG: 81 TABLET ORAL at 09:01

## 2019-01-23 RX ADMIN — CARVEDILOL 25 MG: 25 TABLET, FILM COATED ORAL at 09:01

## 2019-01-23 RX ADMIN — POLYETHYLENE GLYCOL 3350 17 G: 17 POWDER, FOR SOLUTION ORAL at 09:01

## 2019-01-23 RX ADMIN — HYDROCODONE BITARTRATE AND ACETAMINOPHEN 1 TABLET: 5; 325 TABLET ORAL at 06:01

## 2019-01-23 RX ADMIN — Medication 3 ML: at 09:01

## 2019-01-23 RX ADMIN — FUROSEMIDE 80 MG: 10 INJECTION, SOLUTION INTRAMUSCULAR; INTRAVENOUS at 09:01

## 2019-01-23 RX ADMIN — NITROGLYCERIN 0.4 MG: 0.4 TABLET, ORALLY DISINTEGRATING SUBLINGUAL at 09:01

## 2019-01-23 RX ADMIN — CARVEDILOL 25 MG: 25 TABLET, FILM COATED ORAL at 05:01

## 2019-01-23 RX ADMIN — HEPARIN SODIUM 7500 UNITS: 5000 INJECTION, SOLUTION INTRAVENOUS; SUBCUTANEOUS at 05:01

## 2019-01-23 NOTE — PLAN OF CARE
TN spoke with pt's dtr, she will be at [facility] between 12:30-1:00PM to sign paperwork during her lunch break, SW updated on above.

## 2019-01-23 NOTE — PHYSICIAN QUERY
PT Name: Franklin Araya  MR #: [id number]    Physician Query Form - CardioPulmonary Clarification      CDS/: Mary Godfrey RN              Contact information: [phone]    This form is a permanent document in the medical record.    Query Date: January 23, 2019    By submitting this query, we are merely seeking further clarification of documentation. Please utilize your independent clinical judgment when addressing the question(s) below.    The Medical record contains the following:   Indicators   Supporting Clinical Findings Location in Medical Record   x Pulmonary Hypertension documented Pulmonary HTN   - TTE this admission demonstrating PA pressure of 44   - stable on 1-2 L NC   - Will continue to follow up with her outpx Cardiologist on discharge    Progress Note 1/22         Internal Medicine   x Acute/Chronic Illness Acute on chronic Hypoxic and Hypercapnic Respiratory Failure,   Acute on Chronic Diastolic HF   NSTEMI     CKD 3a secondary to Diabetic Nephropathy   T2DM with Nephropathy   HTN   HLD           Progress Note 1/23       Internal Medicine     x Echo and/or Heart Cath Findings Conclusion   · Moderate left ventricular enlargement.  · Concentric left ventricular hypertrophy.  · Low normal left ventricular systolic function.The estimated ejection fraction is 50%   · Grade I (mild) left ventricular diastolic dysfunction consistent with impaired relaxation.  · Normal right ventricular systolic function.  · Mild left atrial enlargement.  · Mild to moderate tricuspid regurgitation.  · Pulmonary hypertension present.  · Normal left atrial pressure.  · Elevated central venous pressure (15 mm Hg).  · The estimated PA systolic pressure is 44 mm Hg   TTE 1/22   x BiPAP/Intubation/Supplemental O2 Acute on chronic Hypoxic and Hypercapnic Respiratory Failure    - Hx, anasarca and rales on PE, CXR and BNP c/w pulmonary edema secondary to CHF; ran out of lasix > 1 mo ago with likely component of  COPD as well   - continuing NC during the day and BIPAP qhs at this time    Progress Note 1/23       Internal Medicine    Progress Note 1/22         Internal Medicine        SOB, REECE, Fatigue, Dizziness, LE Edema, Cyanosis, Chest Pain, Respiratory Distress, Hypoxia, etc.      Treatment         Medication     x Other Acute on Chronic Diastolic HF   - Admission to [facility] in [place] in 11/2018 with similar presentation; TTE at that time demonstrating grade 2 diastolic dysfunction; pulm artery pressure of 62   H&P 1/20       Hospital Medicine   Provider, please specify the type of pulmonary hypertension:  [   ] Group 2:  Pulmonary Hypertension due to Left Heart Disease, including left heart failure and/or left heart valve disease     [   ] Group 3:  Pulmonary Hypertension due to Lung Disease     [ x  ] Pulmonary Hypertension, unspecified     [   ] Other Cardiopulmonary Condition (please specify):     [  ] Clinically Undetermined         Please document in your progress notes daily for the duration of treatment, until resolved, and include in your discharge summary.

## 2019-01-23 NOTE — PLAN OF CARE
Problem: Occupational Therapy Goal  Goal: Occupational Therapy Goal  Goals to be met by: 02/21/2019      Patient will increase functional independence with ADLs by performing:    UE Dressing with Modified Maury.  LE Dressing with Modified Maury.  Grooming while standing with Set-up Assistance.  Toileting from toilet with Modified Maury for hygiene and clothing management.   Sitting at edge of bed x10 minutes with Supervision.  Supine to sit with Modified Maury.  Toilet transfer to toilet with Supervision.  Increased functional strength to WFL for self care.  Upper extremity exercise program x10 reps per handout, with assistance as needed.     Outcome: Ongoing (interventions implemented as appropriate)  Patient with improved activity tolerance noted this date. Some SOB after exertion, but continues to make progress. Will benefit from continued skilled OT to address functional deficits.

## 2019-01-23 NOTE — PROGRESS NOTES
The Sw spoke to the team and the pt's ready for d/c. The Sw spoke to Alissa at [facility] and she will call the pt's dtr to inform her to come sign the admit papers today. The team informed her of this info also. The Sw left her a message informing her also. Alissa states she submitted to [facility] for the snf auth. The pt will not be able to admit until they receive the snf auth. Rissa spoke to the pt's dtr and she states she will go sign the paperwork at 12:30pm. The Sw faxed the corrected d/c orders to [facility] via [facility].

## 2019-01-23 NOTE — PLAN OF CARE
01/23/19 1238   Medicare Message   Important Message from Medicare regarding Discharge Appeal Rights Given to patient/caregiver;Explained to patient/caregiver  (The sw spoke to the pt's dtr Mary Anne via phone and informed her of the pt's Medicare Rights and she acknowledged understanding. A copy was left at bedside. )   Date IMM was signed 01/23/19   Time IMM was signed 0905

## 2019-01-23 NOTE — PLAN OF CARE
Problem: Adult Inpatient Plan of Care  Goal: Plan of Care Review  Outcome: Ongoing (interventions implemented as appropriate)  Plan of care reviewed with patient. Patient verbalized understanding. Blood glucose monitored continued. Fall precautions maintained. Bed in lowest position, call light within reach, 2x bed rails, pt wearing slip resistant socks. Patient notified to ask staff for assistance and pt verbalized complete understanding. Pt on telemetry with no ectopy noted. Will continue to monitor. Pt anticipates discharge to  [facility].

## 2019-01-23 NOTE — PROGRESS NOTES
01/23/19 1040   Type of Frequent Check   Type Patient Rounds;Other (see comments)  (VN Rounds, Sitting edge of bed, Therapy at bedside)   Safety/Activity   Patient Rounds bed in low position;visualized patient   Activity Management activity adjusted per tolerance*   Positioning   Body Position sitting up in bed;other (see comments)  (sitting edge of bed)   Assessments (Pre/Post)   Level of Consciousness (AVPU) alert

## 2019-01-23 NOTE — PHYSICIAN QUERY
PT Name: Franklin Araya  MR #: [id number]     Physician Query Form - Documentation Clarification      CDS/: Shannon Hansen               Contact information:    This form is a permanent document in the medical record.     Query Date: January 23, 2019    By submitting this query, we are merely seeking further clarification of documentation. Please utilize your independent clinical judgment when addressing the question(s) below.    The Medical record reflects the following:    Supporting Clinical Findings Location in Medical Record     NSTEMI   - consistent with demand ischemia secondary to acute heart failure exacerbation   - Troponin of 0.029 in ED; Rpt 0.034   - denies CP; will continue to monitor            Progress Note 1/23       Internal Medicine             Troponin  0.029--> 0.034       Lab 1/20, 1/20                                                                            Doctor, Please specify diagnosis or diagnoses associated with above clinical findings.    Provider Use Only    [   ]  NSTEMI due to demand ischemia (Type 2 MI )    [   ]  Demand ischemia only    [   ]  Other___________________________                                                                                                                 [  ] Clinically Undetermined

## 2019-01-23 NOTE — PHYSICIAN QUERY
PT Name: Franklin Araya  MR #: [id number]    Physician Query Form - CardioPulmonary Clarification      CDS/: Shannon Hansen RN              Contact information: [phone]    This form is a permanent document in the medical record.    Query Date: January 23, 2019    By submitting this query, we are merely seeking further clarification of documentation. Please utilize your independent clinical judgment when addressing the question(s) below.    The Medical record contains the following:   Indicators   Supporting Clinical Findings Location in Medical Record   x Pulmonary Hypertension documented Pulmonary HTN   - TTE this admission demonstrating PA pressure of 44   - stable on 1-2 L NC   - Will continue to follow up with her outpx Cardiologist on discharge    Progress Note 1/22         Internal Medicine   x Acute/Chronic Illness Acute on chronic Hypoxic and Hypercapnic Respiratory Failure,   Acute on Chronic Diastolic HF   NSTEMI     CKD 3a secondary to Diabetic Nephropathy   T2DM with Nephropathy   HTN   HLD           Progress Note 1/23       Internal Medicine     x Echo and/or Heart Cath Findings Conclusion   · Moderate left ventricular enlargement.  · Concentric left ventricular hypertrophy.  · Low normal left ventricular systolic function.The estimated ejection fraction is 50%   · Grade I (mild) left ventricular diastolic dysfunction consistent with impaired relaxation.  · Normal right ventricular systolic function.  · Mild left atrial enlargement.  · Mild to moderate tricuspid regurgitation.  · Pulmonary hypertension present.  · Normal left atrial pressure.  · Elevated central venous pressure (15 mm Hg).  · The estimated PA systolic pressure is 44 mm Hg   TTE 1/22   x BiPAP/Intubation/Supplemental O2 Acute on chronic Hypoxic and Hypercapnic Respiratory Failure    - Hx, anasarca and rales on PE, CXR and BNP c/w pulmonary edema secondary to CHF; ran out of lasix > 1 mo ago with likely component of COPD  as well   - continuing NC during the day and BIPAP qhs at this time    Progress Note 1/23       Internal Medicine    Progress Note 1/22         Internal Medicine        SOB, REECE, Fatigue, Dizziness, LE Edema, Cyanosis, Chest Pain, Respiratory Distress, Hypoxia, etc.      Treatment         Medication     x Other Acute on Chronic Diastolic HF   - Admission to [facility] in [place] in 11/2018 with similar presentation; TTE at that time demonstrating grade 2 diastolic dysfunction; pulm artery pressure of 62   H&P 1/20       Hospital Medicine   Provider, please specify the type of pulmonary hypertension:  [   ] Group 2:  Pulmonary Hypertension due to Left Heart Disease, including left heart failure and/or left heart valve disease     [   ] Group 3:  Pulmonary Hypertension due to Lung Disease     [   ] Pulmonary Hypertension, unspecified     [   ] Other Cardiopulmonary Condition (please specify):     [  ] Clinically Undetermined         Please document in your progress notes daily for the duration of treatment, until resolved, and include in your discharge summary.

## 2019-01-23 NOTE — PLAN OF CARE
Problem: Adult Inpatient Plan of Care  Goal: Plan of Care Review   01/23/19 0033   Plan of Care Review   Plan of Care Reviewed With patient   Italian speaking only but does understand some english. Pt reported no pain.      0345 pt able to turn off bed alarm.  She was found twice out of bed w/no alarm set.  Nurse advised pt not to touch bed setting again.     0532 nurse found bed alarm off again.      Tele: NSR,  HR reg, 60 70,  No alarms.     Bed in lowest position, wheels locked, non skid socks, ID band worn, personal items and call bell with in reach, bed alarm set.

## 2019-01-23 NOTE — PROGRESS NOTES
The Sw spoke to the pt's dtr and she's going to sign the admit paperwork at 1:30pm. She arranged the time with Christiane in admissions. The Sw informed the pt's nurse of the above mentioned info. The pt choice form has been completed and placed in the pt's chart. The pt's dtr is in agreement with the d/c plan to [facility].

## 2019-01-23 NOTE — PROGRESS NOTES
The Sw called [facility]([phone])spoke to Leona who states they are backed up but she sees where the Locet was called in yesterday by the Sw. She asked the Sw to resubmit the PASRR. The Sw resubmitted the PASRR to [facility]. The Sw asked could it be expedited and she stated no b/c they're backed up and states they have 24-48 hours to send the 142.

## 2019-01-23 NOTE — PLAN OF CARE
[facility]    FACILITY TRANSFER ORDERS      Patient Name: Franklin Araya  YOB: 1946    PCP: Rom Jacques MD   PCP Address: [facility] [facility] [place]  PCP Phone Number: [phone]  PCP Fax: [phone]    Encounter Date: 01/23/2019    Admit to: [facility]     Vital Signs:  Routine    Diagnoses:   Active Hospital Problems    Diagnosis  POA    *Acute on chronic respiratory failure with hypoxia and hypercapnia [J96.21, J96.22]  Yes    Pulmonary hypertension [I27.20]  Yes    Uncontrolled hypertension [I10]  Yes    Elevated troponin [R74.8]  Yes    Physical deconditioning [R53.81]  Yes    Type 2 diabetes mellitus with stage 3 chronic kidney disease, with long-term current use of insulin [E11.22, N18.3, Z79.4]  Not Applicable    Morbid obesity [E66.01]  Yes    History of stroke [Z86.73]  Not Applicable    Hyperlipidemia associated with type 2 diabetes mellitus [E11.69, E78.5]  Yes    Noncompliance with medication regimen [Z91.14]  Not Applicable    Acute on chronic diastolic congestive heart failure [I50.33]  Yes      Resolved Hospital Problems   No resolved problems to display.       Allergies:  Review of patient's allergies indicates:   Allergen Reactions    Morphine Hallucinations       Diet: diabetic diet: 2000 calorie    Activities: Activity as tolerated    Nursing: Patient needs continuous low-flow oxygen via Nasal Canula. The flow should be titrated to appropriate liters per minute in order to keep patient's oxygen saturation between 88-92%.     Labs: none     CONSULTS:    Physical Therapy to evaluate and treat.  and Occupational Therapy to evaluate and treat.    MISCELLANEOUS CARE:   N/A    WOUND CARE ORDERS  None    Medications: Review discharge medications with patient and family and provide education.      Current Discharge Medication List      START taking these medications    Details   furosemide (LASIX) 80 MG  tablet Take 1 tablet (80 mg total) by mouth 2 (two) times daily.  Qty: 60 tablet, Refills: 11      glipiZIDE (GLUCOTROL) 2.5 MG TR24 Take 2 tablets (5 mg total) by mouth daily with breakfast.  Qty: 180 tablet, Refills: 3         CONTINUE these medications which have NOT CHANGED    Details   albuterol 90 mcg/actuation inhaler Inhale 1-2 puffs into the lungs every 6 (six) hours as needed for Wheezing. Rescue  Qty: 1 Inhaler, Refills: 0      albuterol-ipratropium (DUO-NEB) 2.5 mg-0.5 mg/3 mL nebulizer solution Take 3 mLs by nebulization every 4 (four) hours. Rescue  Refills: 0      aspirin 81 MG Chew Take 1 tablet (81 mg total) by mouth once daily.  Refills: 0      atorvastatin (LIPITOR) 40 MG tablet Take 1 tablet (40 mg total) by mouth once daily.  Qty: 90 tablet, Refills: 3      buPROPion (WELLBUTRIN SR) 100 MG TBSR 12 hr tablet Take 1 tablet (100 mg total) by mouth 2 (two) times daily.  Qty: 60 tablet, Refills: 11      carvedilol (COREG) 12.5 MG tablet Take 1 tablet (12.5 mg total) by mouth 2 (two) times daily with meals.  Qty: 60 tablet, Refills: 11      famotidine (PEPCID) 20 MG tablet Take 1 tablet (20 mg total) by mouth once daily.      insulin aspart U-100 (NOVOLOG) 100 unit/mL InPn pen Inject 4 Units into the skin 3 (three) times daily.  Qty: 15 mL, Refills: 11      oxybutynin (DITROPAN) 5 MG Tab Take 1 tablet (5 mg total) by mouth 2 (two) times daily.  Qty: 60 tablet, Refills: 11         STOP taking these medications       baclofen (LIORESAL) 20 MG tablet Comments:   Reason for Stopping:         gabapentin (NEURONTIN) 300 MG capsule Comments:   Reason for Stopping:         hydrocodone-acetaminophen 5-325mg (NORCO) 5-325 mg per tablet Comments:   Reason for Stopping:         insulin glargine (LANTUS) 100 unit/mL injection Comments:   Reason for Stopping:                    _________________________________  Mary Godfrey MD  01/23/2019

## 2019-01-23 NOTE — PT/OT/SLP PROGRESS
Physical Therapy  Visit Attempt    Patient Name:  Franklin Araya   MRN:  [id number]    Patient not seen today secondary to pt deferring therapy at this time as she was sitting up in chair sleeping when PT entered and requested to continue to sleep. Will follow-up as available.    Sonam Eng, PT   1/23/2019

## 2019-01-23 NOTE — PLAN OF CARE
01/23/19 1612   Post-Acute Status   Post-Acute Placement Status Pending Payor Review  ([employer] is requesting a peer-peer with the  for this pt. )

## 2019-01-23 NOTE — PLAN OF CARE
01/23/19 1238   Medicare Message   Important Message from Medicare regarding Discharge Appeal Rights Given to patient/caregiver;Explained to patient/caregiver  (The sw spoke to the pt's dtr Mary Anne via phone and informed her of the pt's Medicare Rights and she acknowledged understanding. A copy was left at bedside. )

## 2019-01-23 NOTE — PLAN OF CARE
Problem: Adult Inpatient Plan of Care  Goal: Plan of Care Review  Outcome: Ongoing (interventions implemented as appropriate)  Plan of care reviewed with patient. Patient verbalized understanding. Blood glucose monitoring continued. Fall precautions maintained. Bed in lowest position, call light within reach, 2x bed rails, pt wearing slip resistant socks. Patient notified to ask staff for assistance and pt verbalized complete understanding. Pt on telemetry with no ectopy noted. Will continue to monitor.

## 2019-01-23 NOTE — PT/OT/SLP PROGRESS
Occupational Therapy   Treatment    Name: Franklin Aryaa  MRN: [id number]  Admitting Diagnosis:  Acute on chronic respiratory failure with hypoxia and hypercapnia       Recommendations:     Discharge Recommendations: nursing facility, skilled  Discharge Equipment Recommendations:  walker, rolling(Would benefit from toileting tongs 2* body habitus)  Barriers to discharge:  Decreased caregiver support    Assessment:   Patient with improved activity tolerance noted this date. Some SOB after exertion, but continues to make progress. Will benefit from continued skilled OT to address functional deficits.     Franklin Araya is a 72 y.o. female with a medical diagnosis of Acute on chronic respiratory failure with hypoxia and hypercapnia. Performance deficits affecting function are weakness, impaired endurance, impaired self care skills, impaired functional mobilty, gait instability, impaired balance, decreased upper extremity function, decreased lower extremity function, decreased safety awareness, impaired cardiopulmonary response to activity, edema.     Rehab Prognosis:  Good; patient would benefit from acute skilled OT services to address these deficits and reach maximum level of function.       Plan:     Patient to be seen 5 x/week to address the above listed problems via self-care/home management, therapeutic activities, therapeutic exercises  · Plan of Care Expires: 02/21/19  · Plan of Care Reviewed with: patient(Simultaneous filing. User may not have seen previous data.)    Subjective     Pain/Comfort:  · Pain Rating 1: (reported LBP, that resolved with positional change)    Objective:     Communicated with: nurseGiulia prior to session.  Patient found HOB elevated with bed alarm, telemetry, oxygen upon OT entry to room.    General Precautions: Standard, fall, respiratory   Orthopedic Precautions:N/A   Braces: N/A     Bed Mobility:    · Patient completed Scooting/Bridging with stand by assistance and  with side rail  · Patient completed Supine to Sit with stand by assistance and with side rail     Functional Mobility/Transfers:  · Patient completed Sit <> Stand Transfer with contact guard assistance  with  no assistive device   · Patient completed Bed <> Chair Transfer using Step Transfer technique with stand by assistance with rolling walker  · Patient completed Toilet Transfer Step Transfer technique with stand by assistance with  no AD and bedside commode    Activities of Daily Living:  · Grooming: set-up and SBA standing at sink    · Lower Body Dressing: maximal assistance to lexis socks  · Toileting: maximal assistance for hygiene 2* body habitus and patient difficulty wipng    [facility] 6 Click ADL: 16    Treatment & Education:  Patient requesting to use the BSC on arrival. Bed mob and t/f as above. Patient unable to perform hygiene. Patient ambulated around EOB using RW with SBA-CGA. Completed G/H tasks at sink. Patient then ambulated to bedside chair.     Patient left up in chair with all lines intact, call button in reach, chair alarm on, nurse notified and PCT presentEducation:      GOALS:   Multidisciplinary Problems     Occupational Therapy Goals        Problem: Occupational Therapy Goal    Goal Priority Disciplines Outcome Interventions   Occupational Therapy Goal     OT, PT/OT Ongoing (interventions implemented as appropriate)    Description:  Goals to be met by: 02/21/2019      Patient will increase functional independence with ADLs by performing:    UE Dressing with Modified Bivins.  LE Dressing with Modified Bivins.  Grooming while standing with Set-up Assistance.  Toileting from toilet with Modified Bivins for hygiene and clothing management.   Sitting at edge of bed x10 minutes with Supervision.  Supine to sit with Modified Bivins.  Toilet transfer to toilet with Supervision.  Increased functional strength to WFL for self care.  Upper extremity exercise program x10 reps per  handout, with assistance as needed.                      Time Tracking:     OT Date of Treatment: 01/23/19  OT Start Time: 1032  OT Stop Time: 1100  OT Total Time (min): 28 min    Billable Minutes:Self Care/Home Management 28    OLIMPIA France/DAMIAN  1/23/2019

## 2019-01-23 NOTE — PROGRESS NOTES
The Sw received a call from Alissa at [facility] stating she reached out to the pt's dtr and she states she will not be able to get to [facility] to sign the paperwork until tomorrow but the states if she does it will not be until later this evening. The Sw reached out to Rissa and she will speak with the team to see if the pt's ready b/c the pt's dtr will have to go to [facility] to sign the paperwork today b/c the pt will not be able to stay in the hospital another night just b/c she can't sign paperwork,insurancwe may not cover the extra night if she doesn't meet criteria.

## 2019-01-23 NOTE — PROGRESS NOTES
The Sw spoke to Christiane at [facility] and she states the pt's dtr can't come at that time b/c she already has an appointment scheduled. She will call her to change the time.

## 2019-01-23 NOTE — PROGRESS NOTES
The Dickson just received a call from Christiane at [facility] stating Mohan called and notified her they will not give a snf auth on the pt and are requesting the dr call them at [phone] to do a peer-peer that must be done by noon tomorrow. The Dickson spoke to Dr. Hernandez and informed her of the above mentioned info and she states she will call. She informed the Sw she will cancel the d/c for today b/c the pt will not d/c today. The Sw informed Generra of this info.

## 2019-01-23 NOTE — PHYSICIAN QUERY
PT Name: Franklin Araya  MR #: [id number]     Physician Query Form - Documentation Clarification      CDS/: Mary Godfrey               Contact information:    This form is a permanent document in the medical record.     Query Date: January 23, 2019    By submitting this query, we are merely seeking further clarification of documentation. Please utilize your independent clinical judgment when addressing the question(s) below.    The Medical record reflects the following:    Supporting Clinical Findings Location in Medical Record     NSTEMI   - consistent with demand ischemia secondary to acute heart failure exacerbation   - Troponin of 0.029 in ED; Rpt 0.034   - denies CP; will continue to monitor            Progress Note 1/23       Internal Medicine             Troponin  0.029--> 0.034       Lab 1/20, 1/20                                                                            Doctor, Please specify diagnosis or diagnoses associated with above clinical findings.    Provider Use Only    [ x  ]  NSTEMI due to demand ischemia (Type 2 MI )    [   ]  Demand ischemia only    [   ]  Other___________________________                                                                                                                 [  ] Clinically Undetermined

## 2019-01-23 NOTE — PROGRESS NOTES
[facility] Hospital Medicine Progress Note    Primary Team: [facility] Hospitalist Team A  Attending Physician: Regina Mehta MD  Resident: Pablito  Intern: Epifanio    Subjective:      Patient sitting up, eating breakfast. No increased WOB appreciable. On 3 L O2 via NC. States she felt SOB when attempted to wean. Still endorsing orthopnea.     Net output of 2900 over last 24 hours.      Objective:     Last 24 Hour Vital Signs:  BP  Min: 118/57  Max: 169/78  Temp  Av °F (36.1 °C)  Min: 96 °F (35.6 °C)  Max: 98 °F (36.7 °C)  Pulse  Av.5  Min: 58  Max: 68  Resp  Av.4  Min: 16  Max: 20  SpO2  Av.5 %  Min: 92 %  Max: 100 %  Weight  Av.1 kg (258 lb 4.3 oz)  Min: 116.9 kg (257 lb 11.5 oz)  Max: 117.4 kg (258 lb 13.1 oz)  I/O last 3 completed shifts:  In: 1020 [P.O.:1020]  Out: 4670 [Urine:4270; Emesis/NG output:400]    Physical Examination:  General: Very obese female, NAD, on 3L NC   HENT: NCAT, nares patent with NC, PERRLA, EOMI, no pharyngeal erythema   Cardio: RRR, no MGR, distal pulses not palpable due to severe edema  Pulm: CTABL, mild end exp wheezing , patient satting well on 1L NC, weaned off bipap, no respiratory distress at this time   Abd: very obese, faustino to palpation subumbilical region, positive fluid wave  Ext: impressive edema (skin wrinkling minor but present suggesting small improvement), symmetric, TTP   Skin: warm and dry  Psych: appropriate mood and affect   Neuro: AAOx3, no focal deficits      Laboratory:  Laboratory Data Reviewed: yes  Pertinent Findings:  Trop: 0.029 > 0.034  BNP:    A1C: 7.8     Microbiology Data Reviewed: yes  Pertinent Findings:  None         Current Medications:     Infusions:       Scheduled:   aspirin  81 mg Oral Daily    atorvastatin  40 mg Oral Daily    carvedilol  25 mg Oral BID WM    furosemide  80 mg Intravenous Q8H    guaiFENesin  600 mg Oral BID    heparin (porcine)  7,500 Units Subcutaneous Q8H    polyethylene glycol  17 g Oral Daily     sodium chloride 0.9%  3 mL Intravenous Q8H        PRN:  dextrose 50%, dextrose 50%, glucagon (human recombinant), glucose, glucose, hydrALAZINE, HYDROcodone-acetaminophen, insulin aspart U-100, ondansetron        Assessment:     Franklin Araya is a 72 y.o.female with  Patient Active Problem List    Diagnosis Date Noted    Pulmonary hypertension     Acute on chronic respiratory failure with hypoxia and hypercapnia 01/21/2019    Uncontrolled hypertension 01/21/2019    Elevated troponin 01/21/2019    Physical deconditioning 01/21/2019    Type 2 diabetes mellitus with stage 3 chronic kidney disease, with long-term current use of insulin 01/21/2019    Morbid obesity 01/21/2019    History of stroke 01/21/2019    Hyperlipidemia associated with type 2 diabetes mellitus     Noncompliance with medication regimen     Acute on chronic diastolic congestive heart failure 01/20/2019    Noncompliance 06/14/2018    Vaginal bleeding 06/13/2018    Cytotoxic cerebral edema 06/11/2018    Pulmonary nodule 06/11/2018    Esophageal dysphagia 06/11/2018    Mixed hyperlipidemia 06/11/2018    Embolic stroke involving left middle cerebral artery 06/10/2018    Received tissue plasminogen activator (t-PA) less than 24 hours prior to arrival 06/10/2018    Elevated serum creatinine 06/08/2018    Debility 06/08/2018    SOB (shortness of breath) 06/07/2018    COPD exacerbation 06/07/2018    Essential hypertension 06/07/2018    Type 2 diabetes mellitus, with long-term current use of insulin 06/07/2018    Pulmonary edema 06/07/2018        Plan:     Acute on chronic Hypoxic and Hypercapnic Respiratory Failure, RESOLVED  - on 1 L home O2 via NC; will perform trial of RA to determine if continued need for supplemental 02  - continued end-exp wheezing but improving  - Hx, anasarca and rales on PE, CXR and BNP c/w pulmonary edema secondary to CHF; ran out of lasix > 1 mo ago with likely component of COPD as well   -  continuing NC during the day and BIPAP qhs at this time     Acute on Chronic Diastolic HF  - Admission to [facility] in [place] in 11/2018 with similar presentation; TTE at that time demonstrating grade 2 diastolic dysfunction; pulm artery pressure of 62; discharged on lasix 40 BID; patient states ran out within first month after discharge (never received/was able to get refills); rpt TTE with normal EF, mild LAE, mild-mod TR, elevated PA pressure  - Daily weights  - Continuing 80 mg IV lasix q8H x 3   - responding to diuresis with appropriate output; UO of 2900 over last 24 hours, same as day prior    Pulmonary HTN  - TTE this admission demonstrating PA pressure of 44   - stable on 1-2 L NC  - Will continue to follow up with her outpx Cardiologist on discharge     NSTEMI  - consistent with demand ischemia secondary to acute heart failure exacerbation  - Troponin of 0.029 in ED; Rpt 0.034  - denies CP; will continue to monitor      Hypoattenuation in the Left inferior frontal lobe on CT c/w age-indeterminate infarc in June 2018  - unable to perform MRI at the time due to pacemaker  - Evaluated and TPA-ed for MCA stroke  - reports primary deficit post-CVA is memory loss     CKD 3a secondary to Diabetic Nephropathy   - Cr. 1.3; at baseline Cr of 1.3- 1.5; Cr 1.4 this am  - Continuing to monitor     T2DM with Nephropathy  - A1c of 6.6 in Nov. 2018; rpt 7.8  - POCT BG and low-dose SSI initiated       Dysuria  - UA negative and U cx NGTD     HTN  - systolic BP elevated to 140s to 160s; improved since admission  - s/p carvedilol 12.5 in ED  - restarting prior (current?) medications of Amlodipine 10 and Hydralazine 25 TID as well as carvedilol 25 BID     HLD  - continuing lipitor 40   - lipid panel with chol 108, HDL 35, LDL, 60.6      Morbid obesity  - BMI 48   - will  and discuss resources and treatment options prior to discharge     Deconditioned/Debility  - Consulting PT/OT for evaluation and  treatment      Hx of cervical cancer?  - Per patient, informed in a letter that she had cervical cancer in fall 2018 at an OBGYN visit; no further documentation pertaining to this found on initial chart review; states never followed up thereafter  - Reports continuing to have intermittent vaginal bleeding and pain in this area  - will set up referral for OBGYN on dc      Hx of MI and pacemaker placement  - prior documentation references placement in or prior to 2012  - no other information at this time  - will attempt to obtain more information from daughter and other family     Hx of Pulmonary nodule  -  Right upper lobe 5 mm solid pulmonary nodule on CTA in June 2018     Hx of Severe Depression  - px euthymic appearing during interview; will defer further discussion of mood with improvement in respiratory status  - per current medication list, not currently receiving anti-depressant; hx of tx with wellbutrin 150 BID     Hx of Stenosis of SMA, 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

## 2019-01-23 NOTE — PROGRESS NOTES
The Sw spoke to the pt's dtr Mary Anne and notified her about the peer-peer and she stated if the appeal is not overturned she wants the pt to return home with hh. The Sw educated her about long term nhp(relinquish her check) using her Medicare Part B for therapy 3 times a week but she declined stating she wants the pt and home and the pt wants to be home also. The Sw will f/u with her tomorrow after the decision and she acknowledged understanding. The sw encouraged her to call should she have any questions or concerns. The Sw informed Generra of the above mentioned info.     3:28pm The Sw received a call from Fatuma at [facility] stating she received correspondence from [employer] stating the pt is too high functioning for snf that's why they're requesting the peer-peer. If it's upheld the pt will d/c home with her current hh agency [facility]. The Sw left a message for Dr. Hernanedz to return the call in reference to this case.

## 2019-01-23 NOTE — PROGRESS NOTES
01/23/19 1040   Type of Frequent Check   Type Patient Rounds;Other (see comments)  (VN Rounds, Pt Using bathroom)   Safety/Activity   Patient Rounds bed in low position;visualized patient   Activity Management activity adjusted per tolerance*   Positioning   Body Position sitting up in bed;other (see comments)  (sitting edge of bed)   Assessments (Pre/Post)   Level of Consciousness (AVPU) alert

## 2019-01-24 LAB
ANION GAP SERPL CALC-SCNC: 9 MMOL/L
BUN SERPL-MCNC: 34 MG/DL
CALCIUM SERPL-MCNC: 8.9 MG/DL
CHLORIDE SERPL-SCNC: 96 MMOL/L
CO2 SERPL-SCNC: 37 MMOL/L
CREAT SERPL-MCNC: 1.3 MG/DL
EST. GFR  (AFRICAN AMERICAN): 48 ML/MIN/1.73 M^2
EST. GFR  (NON AFRICAN AMERICAN): 42 ML/MIN/1.73 M^2
GLUCOSE SERPL-MCNC: 156 MG/DL
POCT GLUCOSE: 141 MG/DL (ref 70–110)
POCT GLUCOSE: 163 MG/DL (ref 70–110)
POCT GLUCOSE: 165 MG/DL (ref 70–110)
POCT GLUCOSE: 178 MG/DL (ref 70–110)
POTASSIUM SERPL-SCNC: 3.8 MMOL/L
SODIUM SERPL-SCNC: 142 MMOL/L

## 2019-01-24 PROCEDURE — 25000003 PHARM REV CODE 250: Performed by: STUDENT IN AN ORGANIZED HEALTH CARE EDUCATION/TRAINING PROGRAM

## 2019-01-24 PROCEDURE — 97530 THERAPEUTIC ACTIVITIES: CPT

## 2019-01-24 PROCEDURE — 94761 N-INVAS EAR/PLS OXIMETRY MLT: CPT

## 2019-01-24 PROCEDURE — 12000002 HC ACUTE/MED SURGE SEMI-PRIVATE ROOM

## 2019-01-24 PROCEDURE — 80048 BASIC METABOLIC PNL TOTAL CA: CPT

## 2019-01-24 PROCEDURE — 63600175 PHARM REV CODE 636 W HCPCS: Performed by: STUDENT IN AN ORGANIZED HEALTH CARE EDUCATION/TRAINING PROGRAM

## 2019-01-24 PROCEDURE — 36415 COLL VENOUS BLD VENIPUNCTURE: CPT

## 2019-01-24 PROCEDURE — 99900035 HC TECH TIME PER 15 MIN (STAT)

## 2019-01-24 PROCEDURE — 97110 THERAPEUTIC EXERCISES: CPT

## 2019-01-24 PROCEDURE — 27000221 HC OXYGEN, UP TO 24 HOURS

## 2019-01-24 PROCEDURE — A4216 STERILE WATER/SALINE, 10 ML: HCPCS | Performed by: STUDENT IN AN ORGANIZED HEALTH CARE EDUCATION/TRAINING PROGRAM

## 2019-01-24 PROCEDURE — 94660 CPAP INITIATION&MGMT: CPT

## 2019-01-24 PROCEDURE — 97116 GAIT TRAINING THERAPY: CPT

## 2019-01-24 RX ORDER — DICYCLOMINE HYDROCHLORIDE 10 MG/1
10 CAPSULE ORAL 4 TIMES DAILY
Status: DISCONTINUED | OUTPATIENT
Start: 2019-01-24 | End: 2019-01-25 | Stop reason: HOSPADM

## 2019-01-24 RX ADMIN — CARVEDILOL 25 MG: 25 TABLET, FILM COATED ORAL at 04:01

## 2019-01-24 RX ADMIN — HYDRALAZINE HYDROCHLORIDE 25 MG: 25 TABLET, FILM COATED ORAL at 09:01

## 2019-01-24 RX ADMIN — DICYCLOMINE HYDROCHLORIDE 10 MG: 10 CAPSULE ORAL at 08:01

## 2019-01-24 RX ADMIN — DICYCLOMINE HYDROCHLORIDE 10 MG: 10 CAPSULE ORAL at 09:01

## 2019-01-24 RX ADMIN — GUAIFENESIN 600 MG: 600 TABLET, EXTENDED RELEASE ORAL at 09:01

## 2019-01-24 RX ADMIN — DICYCLOMINE HYDROCHLORIDE 10 MG: 10 CAPSULE ORAL at 01:01

## 2019-01-24 RX ADMIN — HEPARIN SODIUM 7500 UNITS: 5000 INJECTION, SOLUTION INTRAVENOUS; SUBCUTANEOUS at 01:01

## 2019-01-24 RX ADMIN — FUROSEMIDE 80 MG: 10 INJECTION, SOLUTION INTRAMUSCULAR; INTRAVENOUS at 06:01

## 2019-01-24 RX ADMIN — GUAIFENESIN 600 MG: 600 TABLET, EXTENDED RELEASE ORAL at 08:01

## 2019-01-24 RX ADMIN — DICYCLOMINE HYDROCHLORIDE 10 MG: 10 CAPSULE ORAL at 04:01

## 2019-01-24 RX ADMIN — Medication 3 ML: at 06:01

## 2019-01-24 RX ADMIN — CARVEDILOL 25 MG: 25 TABLET, FILM COATED ORAL at 08:01

## 2019-01-24 RX ADMIN — POLYETHYLENE GLYCOL 3350 17 G: 17 POWDER, FOR SOLUTION ORAL at 09:01

## 2019-01-24 RX ADMIN — ASPIRIN 81 MG 81 MG: 81 TABLET ORAL at 08:01

## 2019-01-24 RX ADMIN — ATORVASTATIN CALCIUM 40 MG: 40 TABLET, FILM COATED ORAL at 08:01

## 2019-01-24 RX ADMIN — Medication 3 ML: at 02:01

## 2019-01-24 RX ADMIN — HEPARIN SODIUM 7500 UNITS: 5000 INJECTION, SOLUTION INTRAVENOUS; SUBCUTANEOUS at 09:01

## 2019-01-24 RX ADMIN — HYDROCODONE BITARTRATE AND ACETAMINOPHEN 1 TABLET: 5; 325 TABLET ORAL at 06:01

## 2019-01-24 RX ADMIN — HEPARIN SODIUM 7500 UNITS: 5000 INJECTION, SOLUTION INTRAVENOUS; SUBCUTANEOUS at 06:01

## 2019-01-24 NOTE — NURSING
Pt is being discharged. RN spoke with daughter to ensure to bring home O2 tank to hospital when coming to  pt. Daughter verbalized understanding.

## 2019-01-24 NOTE — PROGRESS NOTES
The Sw received a call from the pt's dtr Mary Anne returning a call to the Sw from earlier. The Sw inquired about the location on the home where the pt will be d/c'd b/c home health has to be set up. The pt's dtr refused to give the location of the home. She informed the Sw that the people who own the home don't want her to give the location. 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 The Sw educated her on PT,OT and she still would not give the location. The pt's dtr stated she had to go and thanked the Sw for understanding. The Sw immediately called Fautma at [facility] and informed her of the above mentioned info. Fatuma called the pt's dtr and spoke to her and she would not disclose the location of the home to her either and she stated the pt's dtr refused hh at d/c for the pt. Fatuma informed her she needs to know the location of the home b/c transportation has to pick the pt up Monday for her dr's appointment but the pt's dtr states she will bring her.  Fatuma spoke to her supervisor Liz who informed her to have the hh canceled b/c the pt's dtr was refusing hh and it will be a mess b/c they will not know where to go to service the pt. Fatuma states Liz asked the Sw to call [facility] and Fatuma will call also. Fatuma states the pt has an open case with [facility] currently she thinks. The Sw called [phone] and left a message for them to return the call. The Sw will f/u with [facility] tomorrow.  The Sw informed Generra of the above mentioned info.

## 2019-01-24 NOTE — PLAN OF CARE
Per Elvin, Unable to accept pt back due too many missed visits, TN informed SW of this, SW on phone with Fatuma at the [facility] and informed her this.    Per WILBERTO, Fatuma from [facility] stated to use concerned HH for pt.

## 2019-01-24 NOTE — PLAN OF CARE
Per Bev at [facility], pt received RW in May of 2018, pt will not be qualified to receive another RW and pt would have to get toileting tongs from retail store.

## 2019-01-24 NOTE — DISCHARGE INSTRUCTIONS
Furosemide tablets ([place]) View Edit Remove  Heart Failure, Discharge Instructions for ([place]) View Edit Remove  Heart Failure: Warning Signs of a Flare-Up ([place]) View Edit Remove  Heart Failure: Tracking Your Weight ([place]) View Edit Remove  Heart Failure: Making Changes to Your Diet ([place]) View Edit Remove  Glipizide tablets ([place]) View Edit Remove  High Blood Pressure (Hypertension), Discharge Instructions ([place]) View Edit Remove  Diabetes, Diet ([place]) View Edit Remove

## 2019-01-24 NOTE — PROGRESS NOTES
The Sw received a call from Mary stating she did the peer-peer and she feels it went well. She states someone will call the Sw in 30 minutes with a decision.

## 2019-01-24 NOTE — PROGRESS NOTES
[facility] Hospital Medicine Progress Note    Primary Team: [facility] Hospitalist Team A  Attending Physician: Regina Mehta MD  Resident: Pablito  Intern: Epifanio    Subjective:      Patient with chest pain overnight. Troponin WNL, EKG unchanged. Pain resolved.   Patient now mentioning some mild abdominal cramping. Bentyl prescribed. Will assess for improvement.     Net output of 1800 over last 24 hours.      Objective:     Last 24 Hour Vital Signs:  BP  Min: 135/80  Max: 189/77  Temp  Av.1 °F (36.2 °C)  Min: 96.3 °F (35.7 °C)  Max: 98.6 °F (37 °C)  Pulse  Av.8  Min: 54  Max: 80  Resp  Av.9  Min: 16  Max: 22  SpO2  Av.1 %  Min: 94 %  Max: 99 %  Weight  Av.2 kg (253 lb 15.5 oz)  Min: 115.2 kg (253 lb 15.5 oz)  Max: 115.2 kg (253 lb 15.5 oz)  I/O last 3 completed shifts:  In: 1245 [P.O.:1245]  Out: 6752 [Urine:6350; Emesis/NG output:400; Stool:2]    Physical Examination:  General: Very obese female, NAD, on 3L NC   HENT: NCAT, nares patent with NC, PERRLA, EOMI, no pharyngeal erythema   Cardio: RRR, no MGR, distal pulses not palpable due to severe edema  Pulm: CTABL, mild end exp wheezing , patient satting well on 1L NC, weaned off bipap, no respiratory distress at this time   Abd: very obese, faustino to palpation subumbilical region, positive fluid wave  Ext: impressive edema (skin wrinkling minor but present suggesting small improvement), symmetric, TTP   Skin: warm and dry  Psych: appropriate mood and affect   Neuro: AAOx3, no focal deficits      Laboratory:  Laboratory Data Reviewed: yes  Pertinent Findings:  Trop: 0.029 > 0.034 > 0.023  BNP:    A1C: 7.8     Microbiology Data Reviewed: yes  Pertinent Findings:  None     Current Medications:     Infusions:       Scheduled:   aspirin  81 mg Oral Daily    atorvastatin  40 mg Oral Daily    carvedilol  25 mg Oral BID WM    dicyclomine  10 mg Oral QID    guaiFENesin  600 mg Oral BID    heparin (porcine)  7,500 Units Subcutaneous Q8H     polyethylene glycol  17 g Oral Daily    sodium chloride 0.9%  3 mL Intravenous Q8H        PRN:  dextrose 50%, dextrose 50%, glucagon (human recombinant), glucose, glucose, hydrALAZINE, HYDROcodone-acetaminophen, insulin aspart U-100, nitroGLYCERIN, ondansetron    Assessment:     Franklin Araya is a 72 y.o.female with  Patient Active Problem List    Diagnosis Date Noted    Pulmonary hypertension     Acute on chronic respiratory failure with hypoxia and hypercapnia 01/21/2019    Uncontrolled hypertension 01/21/2019    Elevated troponin 01/21/2019    Physical deconditioning 01/21/2019    Type 2 diabetes mellitus with stage 3 chronic kidney disease, with long-term current use of insulin 01/21/2019    Morbid obesity 01/21/2019    History of stroke 01/21/2019    Hyperlipidemia associated with type 2 diabetes mellitus     Noncompliance with medication regimen     Acute on chronic diastolic congestive heart failure 01/20/2019    Noncompliance 06/14/2018    Vaginal bleeding 06/13/2018    Cytotoxic cerebral edema 06/11/2018    Pulmonary nodule 06/11/2018    Esophageal dysphagia 06/11/2018    Mixed hyperlipidemia 06/11/2018    Embolic stroke involving left middle cerebral artery 06/10/2018    Received tissue plasminogen activator (t-PA) less than 24 hours prior to arrival 06/10/2018    Elevated serum creatinine 06/08/2018    Debility 06/08/2018    SOB (shortness of breath) 06/07/2018    COPD exacerbation 06/07/2018    Essential hypertension 06/07/2018    Type 2 diabetes mellitus, with long-term current use of insulin 06/07/2018    Pulmonary edema 06/07/2018        Plan:     Acute on chronic Hypoxic and Hypercapnic Respiratory Failure, RESOLVED  - on 1 L home O2 via NC; will perform trial of RA to determine if continued need for supplemental 02  - continued end-exp wheezing but improving  - Hx, anasarca and rales on PE, CXR and BNP c/w pulmonary edema secondary to CHF; ran out of lasix > 1  mo ago with likely component of COPD as well   - continuing NC during the day and BIPAP qhs at this time     Acute on Chronic Diastolic HF  - Admission to [facility] in [place] in 11/2018 with similar presentation; TTE at that time demonstrating grade 2 diastolic dysfunction; pulm artery pressure of 62; discharged on lasix 40 BID; patient states ran out within first month after discharge (never received/was able to get refills); rpt TTE with normal EF, mild LAE, mild-mod TR, elevated PA pressure  - Daily weights  - Continuing 80 mg IV lasix q8H x 3   - responding to diuresis with appropriate output; UO of 2900 over last 24 hours, same as day prior    Pulmonary HTN  - TTE this admission demonstrating PA pressure of 44   - stable on 1-2 L NC  - Will continue to follow up with her outpx Cardiologist on discharge     NSTEMI  - consistent with demand ischemia secondary to acute heart failure exacerbation  - Troponin of 0.029 in ED; Rpt 0.034  - denies CP; will continue to monitor      Hypoattenuation in the Left inferior frontal lobe on CT c/w age-indeterminate infarc in June 2018  - unable to perform MRI at the time due to pacemaker  - Evaluated and TPA-ed for MCA stroke  - reports primary deficit post-CVA is memory loss     CKD 3a secondary to Diabetic Nephropathy   - Cr. 1.3; at baseline Cr of 1.3- 1.5; Cr 1.4 this am  - Continuing to monitor     T2DM with Nephropathy  - A1c of 6.6 in Nov. 2018; rpt 7.8  - POCT BG and low-dose SSI initiated       Dysuria  - UA negative and U cx NGTD     HTN  - systolic BP elevated to 140s to 160s; improved since admission  - s/p carvedilol 12.5 in ED  - restarting prior (current?) medications of Amlodipine 10 and Hydralazine 25 TID as well as carvedilol 25 BID     HLD  - continuing lipitor 40   - lipid panel with chol 108, HDL 35, LDL, 60.6      Morbid obesity  - BMI 48   - will  and discuss resources and treatment options prior to  discharge     Deconditioned/Debility  - Consulting PT/OT for evaluation and treatment   - both team recommend SNF placement for this patient   - she is morbidly obese and is deconditioned  - she needs aggressive PT/OT so that she can gain strength to care for herself at home   - her activities are currently limited by SOB and deconditioning after being in hospital   - her daughter lives with her but has failed to adequately care for her in the past   - it is not safe, in my opinion, for this patient to go home in her current state. She needs the therapy and care that can be provided at a SNF      Hx of cervical cancer?  - Per patient, informed in a letter that she had cervical cancer in fall 2018 at an OBGYN visit; no further documentation pertaining to this found on initial chart review; states never followed up thereafter  - Reports continuing to have intermittent vaginal bleeding and pain in this area  - will set up referral for OBGYN on dc      Hx of MI and pacemaker placement  - prior documentation references placement in or prior to 2012  - no other information at this time  - will attempt to obtain more information from daughter and other family     Hx of Pulmonary nodule  -  Right upper lobe 5 mm solid pulmonary nodule on CTA in June 2018     Hx of Severe Depression  - px euthymic appearing during interview; will defer further discussion of mood with improvement in respiratory status  - per current medication list, not currently receiving anti-depressant; hx of tx with wellbutrin 150 BID     Hx of Stenosis of SMA, 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

## 2019-01-24 NOTE — PT/OT/SLP PROGRESS
Physical Therapy Treatment    Patient Name:  Franklin Araya   MRN:  [id number]    Recommendations:     Discharge Recommendations:  nursing facility, skilled   Discharge Equipment Recommendations: walker, rolling   Barriers to discharge: None    Assessment:     Franklin Araya is a 72 y.o. female admitted with a medical diagnosis of Acute on chronic respiratory failure with hypoxia and hypercapnia.  She presents with the following impairments/functional limitations:  weakness, impaired endurance, impaired functional mobilty, gait instability, impaired balance, impaired cardiopulmonary response to activity, decreased lower extremity function.  Pt ambulated 35 x 2 and 55ft and 12 ft with Rw and CGA with portable O2 NC @ 3lpm.  O2 sats prior to gait training 95% and after gait training 94%.  Pt would continue to benefit from P.T. To address impairments listed above.    Rehab Prognosis: Good; patient would benefit from acute skilled PT services to address these deficits and reach maximum level of function.    Recent Surgery: * No surgery found *      Plan:     During this hospitalization, patient to be seen 6 x/week to address the identified rehab impairments via gait training, therapeutic activities, therapeutic exercises and progress toward the following goals:    · Plan of Care Expires:  02/21/19    Subjective     Patient/Family Comments/goals: Pt agreed to tx.  Pain/Comfort:  · Pain Rating 1: 0/10  · Pain Rating Post-Intervention 1: 0/10      Objective:     Communicated with Rn (Summer) prior to session.  Patient found all lines intact, call button in reach, bed alarm on and RN notified bed alarm, telemetry, oxygen  upon PT entry to room.     General Precautions: Standard, fall, respiratory   Orthopedic Precautions:N/A   Braces:       Functional Mobility:  · Bed Mobility:     · Rolling Left:  modified independence and supervision  · Scooting: modified independence, supervision and to EOB  · Supine to  Sit: supervision  · Sit to Supine: supervision  · Transfers:     · Sit to Stand:  contact guard assistance and vc's for hand placement with rolling walker  · Toilet Transfer: stand by assistance and contact guard assistance with  rolling walker and grab bars  using  Step Transfer  · Gait: 35ft x 2, 55ft, and 15ft with RW and CGA.  Pt ambulated with portable O2 NC @ 3lpm with O2 sats 94%.  Pt required standing rest breaks between bouts secondary to mild SOB.  Pt was instructed in pursed lip breathing with visual demonstrationi.  · Balance: sitting good, standing good-, gait fair Rw      AM-PAC 6 CLICK MOBILITY  Turning over in bed (including adjusting bedclothes, sheets and blankets)?: 4  Sitting down on and standing up from a chair with arms (e.g., wheelchair, bedside commode, etc.): 3  Moving from lying on back to sitting on the side of the bed?: 3  Moving to and from a bed to a chair (including a wheelchair)?: 3  Need to walk in hospital room?: 3  Climbing 3-5 steps with a railing?: 2  Basic Mobility Total Score: 18       Therapeutic Activities and Exercises:  VN (Mariluz) translated for PTA and pt (Panamanian speaking).  Pt was on wall unit O2 NC @ 3lpm upon entering the room. Pt used portable O2 for gait training and was placed back on wall unit O2 @ preset 3lpm.   Seated BLE therex: AP, LAQ, hip flexion/ABD/ADD x 15 reps with rest breaks as needed secondary to mild SOB.  Pt transferred to b/s commode with Rw and CGA to urinate, but then stated she had to have a BM and requested to use toilet.  Toilet transfer with RW and grab bar with SBA.  Pt was able to perform hygiene needs independently for urine, but required assistance for hygiene with BM.      Patient left supine with all lines intact, call button in reach, bed alarm on and RN notified..    GOALS:   Multidisciplinary Problems     Physical Therapy Goals        Problem: Physical Therapy Goal    Goal Priority Disciplines Outcome Goal Variances Interventions    Physical Therapy Goal     PT, PT/OT Ongoing (interventions implemented as appropriate)     Description:  Goals to be met by: 19    Patient will increase functional independence with mobility by performin) Supine<>Sit with supervision. Met 19  2) Sit <>Stand with supervision and use of RW  3) Bed <>Chair with supervision and use of RW  4) Pt to ambulate at least 150 feet with supervision and use of RW  5) Pt to perform BLE X10 mod I with handout.                          Time Tracking:     PT Received On: 19  PT Start Time: 1217     PT Stop Time: 1255  PT Total Time (min): 38 min     Billable Minutes: Gait Training 13, Therapeutic Activity 14 and Therapeutic Exercise 11    Treatment Type: Treatment  PT/PTA: PTA     PTA Visit Number: 1     Lenore Woo PTA  2019

## 2019-01-24 NOTE — PLAN OF CARE
[facility] HOME HEALTH ORDERS  FACE TO FACE ENCOUNTER    Patient Name: Franklin Aryaa  YOB: 1946    PCP: Rom Jacques MD   PCP Address: [facility] [place]  PCP Phone Number: [phone]  PCP Fax: [phone]    Encounter Date: 01/24/2019    Admit to Home Health    Diagnoses:  Active Hospital Problems    Diagnosis  POA    *Acute on chronic respiratory failure with hypoxia and hypercapnia [J96.21, J96.22]  Yes    Pulmonary hypertension [I27.20]  Yes    Uncontrolled hypertension [I10]  Yes    Elevated troponin [R74.8]  Yes    Physical deconditioning [R53.81]  Yes    Type 2 diabetes mellitus with stage 3 chronic kidney disease, with long-term current use of insulin [E11.22, N18.3, Z79.4]  Not Applicable    Morbid obesity [E66.01]  Yes    History of stroke [Z86.73]  Not Applicable    Hyperlipidemia associated with type 2 diabetes mellitus [E11.69, E78.5]  Yes    Noncompliance with medication regimen [Z91.14]  Not Applicable    Acute on chronic diastolic congestive heart failure [I50.33]  Yes      Resolved Hospital Problems   No resolved problems to display.       No future appointments.  Follow-up Information     Rom Jacques MD In 1 week.    Specialty:  Internal Medicine  Contact information:  [facility] [address]  [phone]                     I have seen and examined this patient face to face today. My clinical findings that support the need for the home health skilled services and home bound status are the following:  Weakness/numbness causing balance and gait disturbance due to Heart Failure, Weakness/Debility and morbid obesity making it taxing to leave home.  Requiring assistive device to leave home due to unsteady gait caused by  Heart Failure, Weakness/Debility and morbid obesity.  Patient with medication mismanagement issues requiring home bound  status as evidenced by  Poor understanding of medication regimen/dosage, Poor adherence to medication regimen/dosage and failure of family members to accurately give patient meds, which caused this hospitalization.  Medical restrictions requiring assistance of another human to leave home due to  Dyspnea on exertion (SOB), Fluid/volume overload, Unstable ambulation, Decreased range of motions in extremities, Home oxygen requirement and Morbid Obesity.    Allergies:  Review of patient's allergies indicates:   Allergen Reactions    Morphine Hallucinations       Diet: diabetic diet: 2000 calorie    Activities: activity as tolerated    Nursing:   SN to complete comprehensive assessment including routine vital signs. Instruct on disease process and s/s of complications to report to MD. Review/verify medication list sent home with the patient at time of discharge  and instruct patient/caregiver as needed. Frequency may be adjusted depending on start of care date.    Notify MD if SBP > 160 or < 90; DBP > 90 or < 50; HR > 120 or < 50; Temp > 101; Other:         CONSULTS:    Physical Therapy to evaluate and treat. Evaluate for home safety and equipment needs; Establish/upgrade home exercise program. Perform / instruct on therapeutic exercises, gait training, transfer training, and Range of Motion.  Occupational Therapy to evaluate and treat. Evaluate home environment for safety and equipment needs. Perform/Instruct on transfers, ADL training, ROM, and therapeutic exercises.   to evaluate for community resources/long-range planning.  Aide to provide assistance with personal care, ADLs, and vital signs.    MISCELLANEOUS CARE:  Heart Failure:      SN to instruct on the following:    Instruct on the definition of CHF.   Instruct on the signs/sympoms of CHF to be reported.   Instruct on and monitor daily weights.   Instruct on factors that cause exacerbation.   Instruct on action, dose, schedule, and side effects of  medications.   Instruct on diet as prescribed.   Instruct on activity allowed.   Instruct on life-style modifications for life long management of CHF   SN to assess compliance with daily weights, diet, medications, fluid retention,    safety precautions, activities permitted and life-style modifications.   Additional 1-2 SN visits per week as needed for signs and symptoms     of CHF exacerbation.      For Weight Gain > 2-3 lbs in 1 day or 4-6 lbs over 1 week notify PCP:  Increase lasix (oral diuretic) dose to 120 in am and 80 in pm for 5 days temporarily  Home Oxygen:  Oxygen at baseline of 2 L/min nasal canula to be used:  Continuously. Titrate L/mon in order to keep patient pulse ox at 88-92%    WOUND CARE ORDERS  n/a      Medications: Review discharge medications with patient and family and provide education.      Current Discharge Medication List      START taking these medications    Details   furosemide (LASIX) 80 MG tablet Take 1 tablet (80 mg total) by mouth 2 (two) times daily.  Qty: 60 tablet, Refills: 11      glipiZIDE (GLUCOTROL) 2.5 MG TR24 Take 2 tablets (5 mg total) by mouth daily with breakfast.  Qty: 180 tablet, Refills: 3         CONTINUE these medications which have NOT CHANGED    Details   albuterol 90 mcg/actuation inhaler Inhale 1-2 puffs into the lungs every 6 (six) hours as needed for Wheezing. Rescue  Qty: 1 Inhaler, Refills: 0      albuterol-ipratropium (DUO-NEB) 2.5 mg-0.5 mg/3 mL nebulizer solution Take 3 mLs by nebulization every 4 (four) hours. Rescue  Refills: 0      aspirin 81 MG Chew Take 1 tablet (81 mg total) by mouth once daily.  Refills: 0      atorvastatin (LIPITOR) 40 MG tablet Take 1 tablet (40 mg total) by mouth once daily.  Qty: 90 tablet, Refills: 3      buPROPion (WELLBUTRIN SR) 100 MG TBSR 12 hr tablet Take 1 tablet (100 mg total) by mouth 2 (two) times daily.  Qty: 60 tablet, Refills: 11      carvedilol (COREG) 12.5 MG tablet Take 1 tablet (12.5 mg total) by mouth 2  (two) times daily with meals.  Qty: 60 tablet, Refills: 11      famotidine (PEPCID) 20 MG tablet Take 1 tablet (20 mg total) by mouth once daily.      insulin aspart U-100 (NOVOLOG) 100 unit/mL InPn pen Inject 4 Units into the skin 3 (three) times daily.  Qty: 15 mL, Refills: 11      oxybutynin (DITROPAN) 5 MG Tab Take 1 tablet (5 mg total) by mouth 2 (two) times daily.  Qty: 60 tablet, Refills: 11         STOP taking these medications       baclofen (LIORESAL) 20 MG tablet Comments:   Reason for Stopping:         gabapentin (NEURONTIN) 300 MG capsule Comments:   Reason for Stopping:         hydrocodone-acetaminophen 5-325mg (NORCO) 5-325 mg per tablet Comments:   Reason for Stopping:         insulin glargine (LANTUS) 100 unit/mL injection Comments:   Reason for Stopping:               I certify that this patient is confined to her home and needs intermittent skilled nursing care, physical therapy and occupational therapy.

## 2019-01-24 NOTE — NURSING
Pt reported chest pain.  Called   Got stat EKG and nitro.   Gave pt one dose of nitro, she said chest pain dose to 1.5/10.

## 2019-01-24 NOTE — PT/OT/SLP PROGRESS
"Occupational Therapy  Visit Attempt    Patient Name:  Franklin Araya   MRN:  [id number]    Patient not seen today secondary to declining therapy participation at this time -- "I am so tired". Will follow-up later, as time permits.    PM attempt, patient sleeping and reported she just finished with PT.     MIKE France  1/24/2019  "

## 2019-01-24 NOTE — PROGRESS NOTES
VN requested by bedside nurse to translate. Pt stated she misplaced her glucometer and would like assistance in getting it replaced. Bedside nurse aware and will inform CM. Pt aware and agreeable. Will cont to be available and intervene as needed.        01/24/19 0777   Type of Frequent Check   Type Patient Rounds;Other (see comments)  (VN rounds)   Safety/Activity   Patient Rounds visualized patient   Safety Promotion/Fall Prevention bed alarm set;assistive device/personal item within reach   Positioning   Body Position position maintained   Pain/Comfort/Sleep   Preferred Pain Scale word (verbal rating pain scale)   Pain Rating: Rest 0 - no pain   Vital Signs   Temp 96.4 °F (35.8 °C)   Temp src Oral   Pulse 60   Heart Rate Source Monitor   Resp 18   BP (!) 155/67   MAP (mmHg) 96   Patient Position Lying   Assessments (Pre/Post)   Level of Consciousness (AVPU) alert

## 2019-01-24 NOTE — PLAN OF CARE
Pt accepted by [facility] [place] called to confirm that they received HH orders.       01/24/19 1528   Post-Acute Status   Post-Acute Authorization Home Health/Hospice   Home Health/Hospice Status Authorization Obtained

## 2019-01-24 NOTE — PLAN OF CARE
Pt accepted by [facility] .       01/24/19 1559   Post-Acute Status   Post-Acute Authorization Home Health/Hospice   Home Health/Hospice Status Authorization Obtained

## 2019-01-24 NOTE — NURSING
VN note: VN requested to cue into room by MD team to translate POC to pt. Allowed time for questions. Pt aware and agreeable with plan. Will cont to be available and intervene as needed.

## 2019-01-24 NOTE — PLAN OF CARE
Problem: Physical Therapy Goal  Goal: Physical Therapy Goal  Goals to be met by: 19    Patient will increase functional independence with mobility by performin) Supine<>Sit with supervision. Met 19  2) Sit <>Stand with supervision and use of RW  3) Bed <>Chair with supervision and use of RW  4) Pt to ambulate at least 150 feet with supervision and use of RW  5) Pt to perform BLE X10 mod I with handout.        Outcome: Ongoing (interventions implemented as appropriate)  Continue working toward remaining goals.

## 2019-01-24 NOTE — PLAN OF CARE
Order for RW and toileting patsy sent to [facility].       01/24/19 1528   Post-Acute Status   Post-Acute Authorization HME   Home Health/Hospice Status Referrals Sent

## 2019-01-25 VITALS
HEART RATE: 60 BPM | DIASTOLIC BLOOD PRESSURE: 84 MMHG | HEIGHT: 60 IN | OXYGEN SATURATION: 97 % | TEMPERATURE: 97 F | SYSTOLIC BLOOD PRESSURE: 194 MMHG | BODY MASS INDEX: 48.13 KG/M2 | RESPIRATION RATE: 17 BRPM | WEIGHT: 245.13 LBS

## 2019-01-25 LAB
ANION GAP SERPL CALC-SCNC: 10 MMOL/L
BUN SERPL-MCNC: 27 MG/DL
CALCIUM SERPL-MCNC: 8.9 MG/DL
CHLORIDE SERPL-SCNC: 97 MMOL/L
CO2 SERPL-SCNC: 37 MMOL/L
CREAT SERPL-MCNC: 1.2 MG/DL
EST. GFR  (AFRICAN AMERICAN): 53 ML/MIN/1.73 M^2
EST. GFR  (NON AFRICAN AMERICAN): 46 ML/MIN/1.73 M^2
GLUCOSE SERPL-MCNC: 136 MG/DL
POCT GLUCOSE: 136 MG/DL (ref 70–110)
POTASSIUM SERPL-SCNC: 3.8 MMOL/L
SODIUM SERPL-SCNC: 144 MMOL/L

## 2019-01-25 PROCEDURE — 80048 BASIC METABOLIC PNL TOTAL CA: CPT

## 2019-01-25 PROCEDURE — 36415 COLL VENOUS BLD VENIPUNCTURE: CPT

## 2019-01-25 PROCEDURE — 94761 N-INVAS EAR/PLS OXIMETRY MLT: CPT

## 2019-01-25 PROCEDURE — 25000003 PHARM REV CODE 250: Performed by: STUDENT IN AN ORGANIZED HEALTH CARE EDUCATION/TRAINING PROGRAM

## 2019-01-25 PROCEDURE — 63600175 PHARM REV CODE 636 W HCPCS: Performed by: STUDENT IN AN ORGANIZED HEALTH CARE EDUCATION/TRAINING PROGRAM

## 2019-01-25 RX ORDER — HYDRALAZINE HYDROCHLORIDE 25 MG/1
25 TABLET, FILM COATED ORAL 3 TIMES DAILY
Status: DISCONTINUED | OUTPATIENT
Start: 2019-01-25 | End: 2019-01-25 | Stop reason: HOSPADM

## 2019-01-25 RX ORDER — HYDRALAZINE HYDROCHLORIDE 25 MG/1
25 TABLET, FILM COATED ORAL 3 TIMES DAILY
Qty: 90 TABLET | Refills: 11 | Status: SHIPPED | OUTPATIENT
Start: 2019-01-25 | End: 2020-01-25

## 2019-01-25 RX ORDER — FUROSEMIDE 40 MG/1
80 TABLET ORAL 2 TIMES DAILY
Status: DISCONTINUED | OUTPATIENT
Start: 2019-01-25 | End: 2019-01-25 | Stop reason: HOSPADM

## 2019-01-25 RX ADMIN — ASPIRIN 81 MG 81 MG: 81 TABLET ORAL at 08:01

## 2019-01-25 RX ADMIN — FUROSEMIDE 80 MG: 40 TABLET ORAL at 08:01

## 2019-01-25 RX ADMIN — POLYETHYLENE GLYCOL 3350 17 G: 17 POWDER, FOR SOLUTION ORAL at 08:01

## 2019-01-25 RX ADMIN — CARVEDILOL 25 MG: 25 TABLET, FILM COATED ORAL at 08:01

## 2019-01-25 RX ADMIN — GUAIFENESIN 600 MG: 600 TABLET, EXTENDED RELEASE ORAL at 08:01

## 2019-01-25 RX ADMIN — HYDRALAZINE HYDROCHLORIDE 25 MG: 25 TABLET, FILM COATED ORAL at 08:01

## 2019-01-25 RX ADMIN — DICYCLOMINE HYDROCHLORIDE 10 MG: 10 CAPSULE ORAL at 08:01

## 2019-01-25 RX ADMIN — HEPARIN SODIUM 7500 UNITS: 5000 INJECTION, SOLUTION INTRAVENOUS; SUBCUTANEOUS at 05:01

## 2019-01-25 RX ADMIN — ATORVASTATIN CALCIUM 40 MG: 40 TABLET, FILM COATED ORAL at 08:01

## 2019-01-25 NOTE — NURSING
Hydralazine ordered for patient after discharged from facility. Charge nurse will contact  to make sure medication is sent to correct pharmacy and that patient is aware of new medication.

## 2019-01-25 NOTE — PLAN OF CARE
Problem: Adult Inpatient Plan of Care  Goal: Patient-Specific Goal (Individualization)  Patient, in bed, appears to be asleep with eyes closed, Patient appeared to rest peacefully through out the night. Prn hydralazine administered during the night due to patient b/p being 189/77. After administration of po hydralazine, b/p went down to 168/70. No noted distress though out  the night, safety measures in place , will continue to monitor patient.

## 2019-01-25 NOTE — NURSING
called and stated that patients Son in Law will be downstairs waiting for patient in [place]. Please have her dressed and outside in 15 minutes. Explained that patient might still need some education and son must bring up the oxygen tank for patient to be able to leave. Unita states son in law will not be coming up. Case management will provide an oxygen tank for patient to take home. With help from Filipino speaking Nurse Ina Monae patient states she feels safe at home and ok to be discharged home with son in law and daughter. Discharge education and instructions given by Summer Escobar yesterday evening to patient and family. Patient with no further questions. Discharged with no acute distress noted.

## 2019-01-25 NOTE — PROGRESS NOTES
The Sw received a call from Gwen with [facility] stating she's the  assigned to this case and she will f/u with the pt and her family.

## 2019-01-25 NOTE — PLAN OF CARE
01/25/19 1138   Final Note   Assessment Type Final Discharge Note   Anticipated Discharge Disposition (pt's daughter refused to give address to set up )   What phone number can be called within the next 1-3 days to see how you are doing after discharge? [phone]   Hospital Follow Up  Appt(s) scheduled? Yes

## 2019-01-25 NOTE — NURSING
Granddaughter at bedside to  pt. Home O2 tank at bedside, however is empty. Explained to pt and family that pt cannot be transported without O2 as pt sats 85% on RA. Per granddaughter, they live far and no one is able to drive back with a full O2 tank tonight, requesting to DC early AM. Primary team notified and aware. Attempting to set up wheelchair transport with O2 for tonight.

## 2019-01-25 NOTE — PROGRESS NOTES
The Sw spoke to Cecy at [facility] [phone] and reported the case to her. She states they will open a case on this pt. The Sw spoke to Fatuma at [facility] who states the pt's dtr called them 12-18-19 and informed them she was moving to [place] and will not be living at [address] [place] [address] [address] [place] [address]. The Sw spoke to the pt's dtr who states she is currently at work and she can't come to get the pt until she gets off but couldn't give the Sw a time. The Sw advised her this is abandonment b/c the pt's ready for d/c and she refuses to come pick her up. The pt's dtr states she has a full tank at the house but states her  is not home. The pt's dtr got upset and hung up on the Sw stating she's not forgetting about the pt but she has been working double shifts. The pt's dtr states she's trying to locate someone to come get the pt now. The pt's dtr called Bev(director)and stated she's now sending her  to get the pt,states he will be in a red Chevy but he doesn't speak English and to have th pt downstairs in 15 minutes. The Sw explained all this to the pt's nurse and she stated she will have to go over the d/c instructions with him. The Sw encouraged her to call the pt's dtr and go over them with her. The Sw paged the team to notify them of this info. The Sw reweave a call stating the primary team will call the Sw back.

## 2019-01-25 NOTE — PLAN OF CARE
IV was removed earlier when patient was expected to be discharged. Okay to leave IV out per Dr Kenney.

## 2019-01-25 NOTE — PLAN OF CARE
Per Case management director's approval, one O2 tank pulled from [facility] [place] gave O2 tank to charge nurse Radha.

## 2019-01-25 NOTE — PROGRESS NOTES
"\[facility]\"" Hospital Medicine Progress Note    Primary Team: \[facility]\"" Hospitalist Team A  Attending Physician: Regina Mehta MD  Resident: Pablito  Intern: Epifanio    Subjective:      Several social issues overnight with patient's discharge, being addressed by case management. Patient denied by [employer] for SNF even after [id number]. Was going to go home with home health but daughter was not cooperative with providing address, refusing home health. EPS now involved.     Patient received prn po hydralazine overnight for BP of 189/77. BP decreased appropriately to 168/70. No other issues overnight.    Spoke at length this am with patient about home living situation. She states her daughter doesn't technically have a home and that they are currently staying with the daughter's "baby-daddy." The patient does feel safe at home, but feels bad because "after three days, a fish stinks" (a colloquial saying referring to overstaying your welcome). The patient states she would prefer to go elsewhere rather than back with her daughter. She states she had a bad experience once at a nursing home b/c a nurse was mistreating another patient. She wants to discuss her placement options later when her daughter is present.       Objective:     Last 24 Hour Vital Signs:  BP  Min: 165/95  Max: 189/74  Temp  Av.2 °F (36.2 °C)  Min: 96.1 °F (35.6 °C)  Max: 97.7 °F (36.5 °C)  Pulse  Av.8  Min: 55  Max: 73  Resp  Av.8  Min: 18  Max: 20  SpO2  Av %  Min: 95 %  Max: 97 %  Weight  Av.2 kg (245 lb 2.4 oz)  Min: 111.2 kg (245 lb 2.4 oz)  Max: 111.2 kg (245 lb 2.4 oz)  I/O last 3 completed shifts:  In: 625 [P.O.:625]  Out: 1802 [Urine:1800; Stool:2]    Physical Examination:  General: Very obese female, NAD, on 3L NC   HENT: NCAT, nares patent with NC, PERRLA, EOMI, no pharyngeal erythema   Cardio: RRR, no MGR, distal pulses not palpable due to severe edema  Pulm: CTABL, mild end exp wheezing , patient satting well on NC, weaned " off bipap, no respiratory distress at this time   Abd: very obese, faustino to palpation subumbilical region, positive fluid wave  Ext: impressive edema (skin wrinkling minor but present suggesting small improvement), symmetric, TTP   Skin: warm and dry  Psych: appropriate mood and affect   Neuro: AAOx3, no focal deficits      Laboratory:  Laboratory Data Reviewed: yes  Pertinent Findings:  Trop: 0.029 > 0.034 > 0.023  BNP: 2032   A1C: 7.8     Microbiology Data Reviewed: yes  Pertinent Findings:  None     Current Medications:     Infusions:       Scheduled:   aspirin  81 mg Oral Daily    atorvastatin  40 mg Oral Daily    carvedilol  25 mg Oral BID WM    dicyclomine  10 mg Oral QID    guaiFENesin  600 mg Oral BID    heparin (porcine)  7,500 Units Subcutaneous Q8H    polyethylene glycol  17 g Oral Daily    sodium chloride 0.9%  3 mL Intravenous Q8H        PRN:  dextrose 50%, dextrose 50%, glucagon (human recombinant), glucose, glucose, hydrALAZINE, HYDROcodone-acetaminophen, insulin aspart U-100, nitroGLYCERIN, ondansetron    Assessment:     Franklin Araya is a 72 y.o.female with  Patient Active Problem List    Diagnosis Date Noted    Pulmonary hypertension     Acute on chronic respiratory failure with hypoxia and hypercapnia 01/21/2019    Uncontrolled hypertension 01/21/2019    Elevated troponin 01/21/2019    Physical deconditioning 01/21/2019    Type 2 diabetes mellitus with stage 3 chronic kidney disease, with long-term current use of insulin 01/21/2019    Morbid obesity 01/21/2019    History of stroke 01/21/2019    Hyperlipidemia associated with type 2 diabetes mellitus     Noncompliance with medication regimen     Acute on chronic diastolic congestive heart failure 01/20/2019    Noncompliance 06/14/2018    Vaginal bleeding 06/13/2018    Cytotoxic cerebral edema 06/11/2018    Pulmonary nodule 06/11/2018    Esophageal dysphagia 06/11/2018    Mixed hyperlipidemia 06/11/2018     Embolic stroke involving left middle cerebral artery 06/10/2018    Received tissue plasminogen activator (t-PA) less than 24 hours prior to arrival 06/10/2018    Elevated serum creatinine 06/08/2018    Debility 06/08/2018    SOB (shortness of breath) 06/07/2018    COPD exacerbation 06/07/2018    Essential hypertension 06/07/2018    Type 2 diabetes mellitus, with long-term current use of insulin 06/07/2018    Pulmonary edema 06/07/2018        Plan:     Acute on chronic Hypoxic and Hypercapnic Respiratory Failure, RESOLVED  - on 2 L home O2 via NC  - continued end-exp wheezing but improving  - Hx, anasarca and rales on PE, CXR and BNP c/w pulmonary edema secondary to CHF; ran out of lasix > 1 mo ago with likely component of COPD as well   - continuing NC during the day and BIPAP qhs at this time     Acute on Chronic Diastolic HF  - Admission to [facility] in [place] in 11/2018 with similar presentation; TTE at that time demonstrating grade 2 diastolic dysfunction; pulm artery pressure of 62; discharged on lasix 40 BID; patient states ran out within first month after discharge (never received/was able to get refills); rpt TTE with normal EF, mild LAE, mild-mod TR, elevated PA pressure  - Daily weights  - Continuing 80 mg IV lasix q8H x 3; now on po lasix 80 bid. This will be new home dose  - responding to diuresis with appropriate output    Pulmonary HTN  - TTE this admission demonstrating PA pressure of 44   - stable on 1-2 L NC  - Will continue to follow up with her outpx Cardiologist on discharge     NSTEMI  - consistent with demand ischemia secondary to acute heart failure exacerbation  - Troponin of 0.029 in ED; Rpt 0.034  - denies CP; will continue to monitor      Hypoattenuation in the Left inferior frontal lobe on CT c/w age-indeterminate infarc in June 2018  - unable to perform MRI at the time due to pacemaker  - Evaluated and TPA-ed for MCA stroke  - reports primary deficit post-CVA is  memory loss     CKD 3a secondary to Diabetic Nephropathy   - Cr. 1.3; at baseline Cr of 1.3- 1.5  - Continuing to monitor     T2DM with Nephropathy  - A1c of 6.6 in Nov. 2018; rpt 7.8  - POCT BG and low-dose SSI initiated       Dysuria  - UA negative and U cx NGTD     HTN  - systolic BP elevated to 140s to 160s; improved since admission  - s/p carvedilol 12.5 in ED  - restarting prior (current?) medications of Amlodipine 10 and Hydralazine 25 TID as well as carvedilol 25 BID     HLD  - continuing lipitor 40   - lipid panel with chol 108, HDL 35, LDL, 60.6      Morbid obesity  - BMI 48   - will  and discuss resources and treatment options prior to discharge     Deconditioned/Debility  - Consulting PT/OT for evaluation and treatment   - both team recommend SNF placement for this patient   - she is morbidly obese and is deconditioned  - she needs aggressive PT/OT so that she can gain strength to care for herself at home   - her activities are currently limited by SOB and deconditioning after being in hospital   - her daughter lives with her but has failed to adequately care for her in the past   - it is not safe, in my opinion, for this patient to go home in her current state. She needs the therapy and care that can be provided at a SNF   - patient denied by SNF despite [id number]. Plan was to go home with home health b/c patient's daughter refused correction. Patient's daughter was then uncooperative with providing address to home for home health visits and delivery of appropriate DME. Patient's granddaughter was sent to  patient after discharge with an empty oxygen tank, despite having to drive over an hour to get patient home to [place]. Patient was not discharged to these unsafe conditions. EPS now involved.       Hx of cervical cancer?  - Per patient, informed in a letter that she had cervical cancer in fall 2018 at an OBGYN visit; no further documentation pertaining to this found on initial  chart review; states never followed up thereafter  - Reports continuing to have intermittent vaginal bleeding and pain in this area  - will set up referral for OBGYN on dc      Hx of MI and pacemaker placement  - prior documentation references placement in or prior to 2012  - no other information at this time  - will attempt to obtain more information from daughter and other family     Hx of Pulmonary nodule  -  Right upper lobe 5 mm solid pulmonary nodule on CTA in June 2018     Hx of Severe Depression  - px euthymic appearing during interview; will defer further discussion of mood with improvement in respiratory status  - per current medication list, not currently receiving anti-depressant; hx of tx with wellbutrin 150 BID     Hx of Stenosis of SMA, 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

## 2019-01-28 NOTE — DISCHARGE SUMMARY
[facility] Discharge Summary    Primary Team: [facility] Team A  Attending Physician: Janine  Resident: Pablito  Intern: Epifanio    Date of Admit: 1/20/2019  Date of Discharge: 1/25/2019    Discharge to: home  Condition: stable    Discharge Diagnoses     Patient Active Problem List   Diagnosis    SOB (shortness of breath)    COPD exacerbation    Essential hypertension    Type 2 diabetes mellitus, with long-term current use of insulin    Pulmonary edema    Elevated serum creatinine    Debility    Embolic stroke involving left middle cerebral artery    Received tissue plasminogen activator (t-PA) less than 24 hours prior to arrival    Cytotoxic cerebral edema    Pulmonary nodule    Esophageal dysphagia    Mixed hyperlipidemia    Vaginal bleeding    Noncompliance    Acute on chronic diastolic congestive heart failure    Acute on chronic respiratory failure with hypoxia and hypercapnia    Uncontrolled hypertension    Elevated troponin    Physical deconditioning    Type 2 diabetes mellitus with stage 3 chronic kidney disease, with long-term current use of insulin    Morbid obesity    History of stroke    Hyperlipidemia associated with type 2 diabetes mellitus    Noncompliance with medication regimen    Pulmonary hypertension       Consultants and Procedures     Consultants:  Nutrition    Procedures:   none    Brief History of Present Illness        Franklin Araya is a 72 y.o. Wolof-speaking female with a PMH diastolic heart failure, Hx of L MCA CVA, DM2, CKD3, Anemia, CVA, CAD, HTN, morbid obesity, self-reported cervical cancer. The patient is a relatively poor historian so the utility of this history is limited despite using her friend as a reliable . Of note, the patient was discharged from [facility] in  in November of this year for what seems like a similar episode. Some information in this note is gleaned from the charts from that admission.      The patient was in their  "usual state of health, dealing with several chronic medical conditions and on 2 L home oxygen but able to care for herself, until a few months ago when she began to experience progressive SOB, edema. She had been recently discharged from [facility] for heart failure exacerbation, and states she felt good for about a month and then her symptoms she experienced prior to her admission started to come back. She also describes running out of her lasix about a month after her discharge which likely temporally correlates with these symptoms. She describes SOB, edema, generally "not feeling well" that progressively worsened until the point that she is unable to bathe or care for herself. She is unable to sleep at night due to trouble breathing and she endorses both PND and orthopnea. She says she "feels weak, like I'm gonna fall." She states this got so bad today that she felt like she "was gonna die" so she got her friend from [employer] to bring her to the hospital. She states that the only thing that has been able to make her feel better is the bipap. She endorses compliance with at home 2L O2 via NC regimen. She knows she ran out of her lasix as described above but is unsure about her other medications since her daughter administers them. She does report a history of asthma that was diagnosed about 2 years ago. She states she uses an inhaler that hasn't been working. She denies chest pain, fevers, chills, syncope, falls, confusion, blurry vision.         Hospital Course By Problem with Pertinent Findings     Acute on chronic Hypoxic and Hypercapnic Respiratory Failure, RESOLVED  - on 2 L home O2 via NC; continued intermittent, end-exp wheezing but improving  - Hx, anasarca and rales on PE, CXR and BNP c/w pulmonary edema secondary to CHF; ran out of lasix > 1 mo ago with likely component of COPD as well   - continuing NC during the day and BIPAP qhs at this time     Acute on Chronic Diastolic HF  - Admission to [facility] in [place] in 11/2018 with similar presentation; TTE at that time demonstrating grade 2 diastolic dysfunction; pulm artery pressure of 62; discharged on lasix 40 BID; patient states ran out within first month after discharge (never received/was able to get refills); rpt TTE with normal EF, however with mild LAE, mild-mod TR, elevated PA pressure  - Initially on 80 mg IV lasix q8H with good diuresis; now on po lasix 80 bid. This will be new home dose     Pulmonary HTN  - TTE this admission demonstrating PA pressure of 44   - stable on 1-2 L NC  - Follow up Cardiology referral in process      NSTEMI  - consistent with demand ischemia secondary to acute heart failure exacerbation  - Troponin of 0.029 in ED; Rpt 0.034  - denies CP; will continue to monitor      Hypoattenuation in the Left inferior frontal lobe on CT c/w age-indeterminate infarc in June 2018  - unable to perform MRI at the time due to pacemaker  - Evaluated and TPA-ed for MCA stroke  - reports primary deficit post-CVA is memory loss     CKD 3a secondary to Diabetic Nephropathy   - Cr.at baseline of ~ 1.3- 1.5  - Continuing to monitor     T2DM with Nephropathy  - A1c of 6.6 in Nov. 2018; rpt 7.8  - POCT BG and low-dose SSI initiated       Dysuria  - UA negative and U cx NGTD     HTN  - systolic BP elevated to 140s to 160s; improved since admission  - s/p carvedilol 12.5 in ED  - restarting prior (current?) medications of Amlodipine 10 and Hydralazine 25 TID as well as carvedilol 25 BID     HLD  - continuing lipitor 40   - lipid panel with chol 108, HDL 35, LDL, 60.6      Morbid obesity  - BMI 48   - will  and discuss resources and treatment options prior to discharge     Deconditioned/Debility  - Consulting PT/OT for evaluation and treatment   - both team recommend SNF placement for this patient   - she is morbidly obese and is deconditioned  - she needs aggressive PT/OT so that she can gain strength to care for herself at home   - her  activities are currently limited by SOB and deconditioning after being in hospital   - her daughter lives with her but has failed to adequately care for her in the past   - it is not safe, in my opinion, for this patient to go home in her current state. She needs the therapy and care that can be provided at a SNF   - patient denied by SNF despite [id number]. Plan was to go home with home health b/c patient's daughter refused FPC. Patient's daughter was then uncooperative with providing address to home for home health visits and delivery of appropriate DME. Patient's granddaughter was sent to  patient after discharge with an empty oxygen tank, despite having to drive over an hour to get patient home to [place]. Patient was not discharged on day prior to actual discharge due to these unsafe conditions. EPS now involved.       Hx of cervical cancer?  - Per patient, informed in a letter that she had cervical cancer in fall 2018 at an OBGYN visit; no further documentation pertaining to this found on initial chart review; states never followed up thereafter  - Reports continuing to have intermittent vaginal bleeding and pain in this area  - OBGYN originally consulted while patient admitted; agree with follow up as outpatient      Hx of MI and pacemaker placement  - prior documentation references placement in or prior to 2012  - no other information at this time  - not been able to obtain more information from daughter and other family;   - Cardiology follow up referral in process     Hx of Pulmonary nodule  -  Right upper lobe 5 mm solid pulmonary nodule on CTA in June 2018  - No further action at this time     Hx of Severe Depression  - px euthymic appearing during interview; will defer further discussion of mood with improvement in respiratory status  - per current medication list, not currently receiving anti-depressant; hx of tx with wellbutrin 150 BID     Hx of Stenosis of SMA, Mesenteric and Renal  arteries on prior imaging  - denies symptoms at this time; will continue to monitor          Discharge Medications        Medication List      START taking these medications    blood sugar diagnostic Strp  100 strips by Misc.(Non-Drug; Combo Route) route 3 (three) times daily before meals.     furosemide 80 MG tablet  Commonly known as:  LASIX  Take 1 tablet (80 mg total) by mouth 2 (two) times daily.     glipiZIDE 2.5 MG Tr24  Commonly known as:  GLUCOTROL  Take 2 tablets (5 mg total) by mouth daily with breakfast.     hydrALAZINE 25 MG tablet  Commonly known as:  APRESOLINE  Take 1 tablet (25 mg total) by mouth 3 (three) times daily.        CONTINUE taking these medications    albuterol 90 mcg/actuation inhaler  Commonly known as:  PROVENTIL/VENTOLIN HFA  Inhale 1-2 puffs into the lungs every 6 (six) hours as needed for Wheezing. Rescue     albuterol-ipratropium 2.5 mg-0.5 mg/3 mL nebulizer solution  Commonly known as:  DUO-NEB  Take 3 mLs by nebulization every 4 (four) hours. Rescue     aspirin 81 MG Chew  Take 1 tablet (81 mg total) by mouth once daily.     atorvastatin 40 MG tablet  Commonly known as:  LIPITOR  Take 1 tablet (40 mg total) by mouth once daily.     buPROPion 100 MG TBSR 12 hr tablet  Commonly known as:  WELLBUTRIN SR  Take 1 tablet (100 mg total) by mouth 2 (two) times daily.     carvedilol 12.5 MG tablet  Commonly known as:  COREG  Take 1 tablet (12.5 mg total) by mouth 2 (two) times daily with meals.     famotidine 20 MG tablet  Commonly known as:  PEPCID  Take 1 tablet (20 mg total) by mouth once daily.     insulin aspart U-100 100 unit/mL Inpn pen  Commonly known as:  NovoLOG  Inject 4 Units into the skin 3 (three) times daily.     oxybutynin 5 MG Tab  Commonly known as:  DITROPAN  Take 1 tablet (5 mg total) by mouth 2 (two) times daily.        STOP taking these medications    baclofen 20 MG tablet  Commonly known as:  LIORESAL     gabapentin 300 MG capsule  Commonly known as:  NEURONTIN      HYDROcodone-acetaminophen 5-325 mg per tablet  Commonly known as:  NORCO     insulin glargine 100 unit/mL injection  Commonly known as:  LANTUS           Where to Get Your Medications      These medications were sent to [facility] - [place] - [address] AT [facility] & [address]  [address] [address]    Phone:  [phone]   · blood sugar diagnostic Strp  · furosemide 80 MG tablet  · glipiZIDE 2.5 MG Tr24  · hydrALAZINE 25 MG tablet         Discharge Information:   Diet:  Low-sodium diet    Physical Activity:  As tolerated; Home health and Home PT/OT recommended; however, family has been a barrier to set up (see Case Management notes for more details)    Instructions:  1. Take all medications as prescribed  2. Keep all follow-up appointments  3. Return to the hospital or call your primary care physicians if any worsening symptoms such as fever, chills, persistant vomiting, weakness, shortness of breath or chest pain  occur.    Follow-Up Appointments:  Gen care setting up follow up PCP visit  Cardiology follow up visit requested and in process.     Harper Kenney  [facility], HO-2

## 2019-02-28 ENCOUNTER — HOSPITAL ENCOUNTER (EMERGENCY)
Facility: HOSPITAL | Age: 74
Discharge: ANOTHER HOSPITAL | End: 2019-03-01
Attending: EMERGENCY MEDICINE
Payer: MEDICARE

## 2019-02-28 DIAGNOSIS — N85.8 UTERINE MASS: ICD-10-CM

## 2019-02-28 DIAGNOSIS — N93.9 VAGINAL BLEEDING: Primary | ICD-10-CM

## 2019-02-28 LAB
ABO + RH BLD: NORMAL
ALBUMIN SERPL BCP-MCNC: 3.9 G/DL
ALP SERPL-CCNC: 82 U/L
ALT SERPL W/O P-5'-P-CCNC: 15 U/L
ANION GAP SERPL CALC-SCNC: 8 MMOL/L
APTT BLDCRRT: 26.3 SEC
APTT BLDCRRT: 27.6 SEC
AST SERPL-CCNC: 18 U/L
BASOPHILS # BLD AUTO: 0.01 K/UL
BASOPHILS # BLD AUTO: 0.01 K/UL
BASOPHILS NFR BLD: 0.2 %
BASOPHILS NFR BLD: 0.2 %
BILIRUB SERPL-MCNC: 0.7 MG/DL
BILIRUB UR QL STRIP: NEGATIVE
BLD GP AB SCN CELLS X3 SERPL QL: NORMAL
BUN SERPL-MCNC: 40 MG/DL
CALCIUM SERPL-MCNC: 9.9 MG/DL
CHLORIDE SERPL-SCNC: 101 MMOL/L
CLARITY UR: CLEAR
CO2 SERPL-SCNC: 33 MMOL/L
COLOR UR: YELLOW
CREAT SERPL-MCNC: 1.5 MG/DL
DIFFERENTIAL METHOD: ABNORMAL
DIFFERENTIAL METHOD: ABNORMAL
EOSINOPHIL # BLD AUTO: 0.2 K/UL
EOSINOPHIL # BLD AUTO: 0.2 K/UL
EOSINOPHIL NFR BLD: 2.8 %
EOSINOPHIL NFR BLD: 3 %
ERYTHROCYTE [DISTWIDTH] IN BLOOD BY AUTOMATED COUNT: 14.3 %
ERYTHROCYTE [DISTWIDTH] IN BLOOD BY AUTOMATED COUNT: 14.7 %
EST. GFR  (AFRICAN AMERICAN): 40 ML/MIN/1.73 M^2
EST. GFR  (NON AFRICAN AMERICAN): 35 ML/MIN/1.73 M^2
GLUCOSE SERPL-MCNC: 97 MG/DL
GLUCOSE UR QL STRIP: NEGATIVE
HCT VFR BLD AUTO: 38.8 %
HCT VFR BLD AUTO: 42.3 %
HGB BLD-MCNC: 12 G/DL
HGB BLD-MCNC: 13.3 G/DL
HGB UR QL STRIP: NEGATIVE
INR PPP: 1
INR PPP: 1
KETONES UR QL STRIP: NEGATIVE
LEUKOCYTE ESTERASE UR QL STRIP: NEGATIVE
LYMPHOCYTES # BLD AUTO: 1.4 K/UL
LYMPHOCYTES # BLD AUTO: 1.8 K/UL
LYMPHOCYTES NFR BLD: 22.8 %
LYMPHOCYTES NFR BLD: 26.9 %
MCH RBC QN AUTO: 28.7 PG
MCH RBC QN AUTO: 29.2 PG
MCHC RBC AUTO-ENTMCNC: 30.9 G/DL
MCHC RBC AUTO-ENTMCNC: 31.4 G/DL
MCV RBC AUTO: 93 FL
MCV RBC AUTO: 93 FL
MONOCYTES # BLD AUTO: 0.3 K/UL
MONOCYTES # BLD AUTO: 0.4 K/UL
MONOCYTES NFR BLD: 5 %
MONOCYTES NFR BLD: 6 %
NEUTROPHILS # BLD AUTO: 4.2 K/UL
NEUTROPHILS # BLD AUTO: 4.3 K/UL
NEUTROPHILS NFR BLD: 64.7 %
NEUTROPHILS NFR BLD: 68.2 %
NITRITE UR QL STRIP: NEGATIVE
PH UR STRIP: 6 [PH] (ref 5–8)
PLATELET # BLD AUTO: 222 K/UL
PLATELET # BLD AUTO: 242 K/UL
PMV BLD AUTO: 10.9 FL
PMV BLD AUTO: 10.9 FL
POTASSIUM SERPL-SCNC: 4.5 MMOL/L
PROT SERPL-MCNC: 7.2 G/DL
PROT UR QL STRIP: ABNORMAL
PROTHROMBIN TIME: 10.4 SEC
PROTHROMBIN TIME: 10.7 SEC
RBC # BLD AUTO: 4.18 M/UL
RBC # BLD AUTO: 4.56 M/UL
SODIUM SERPL-SCNC: 142 MMOL/L
SP GR UR STRIP: 1.01 (ref 1–1.03)
URN SPEC COLLECT METH UR: ABNORMAL
UROBILINOGEN UR STRIP-ACNC: NEGATIVE EU/DL
WBC # BLD AUTO: 6.13 K/UL
WBC # BLD AUTO: 6.59 K/UL

## 2019-02-28 PROCEDURE — 85025 COMPLETE CBC W/AUTO DIFF WBC: CPT | Mod: 91

## 2019-02-28 PROCEDURE — 99284 EMERGENCY DEPT VISIT MOD MDM: CPT | Mod: 25

## 2019-02-28 PROCEDURE — 93010 ELECTROCARDIOGRAM REPORT: CPT | Mod: ,,, | Performed by: INTERNAL MEDICINE

## 2019-02-28 PROCEDURE — 96360 HYDRATION IV INFUSION INIT: CPT

## 2019-02-28 PROCEDURE — 81003 URINALYSIS AUTO W/O SCOPE: CPT

## 2019-02-28 PROCEDURE — 93005 ELECTROCARDIOGRAM TRACING: CPT

## 2019-02-28 PROCEDURE — 25000003 PHARM REV CODE 250: Performed by: NURSE PRACTITIONER

## 2019-02-28 PROCEDURE — 85610 PROTHROMBIN TIME: CPT | Mod: 91

## 2019-02-28 PROCEDURE — 86850 RBC ANTIBODY SCREEN: CPT

## 2019-02-28 PROCEDURE — 96361 HYDRATE IV INFUSION ADD-ON: CPT

## 2019-02-28 PROCEDURE — 93010 EKG 12-LEAD: ICD-10-PCS | Mod: ,,, | Performed by: INTERNAL MEDICINE

## 2019-02-28 PROCEDURE — 85730 THROMBOPLASTIN TIME PARTIAL: CPT | Mod: 91

## 2019-02-28 PROCEDURE — 25500020 PHARM REV CODE 255: Performed by: EMERGENCY MEDICINE

## 2019-02-28 PROCEDURE — 80053 COMPREHEN METABOLIC PANEL: CPT

## 2019-02-28 RX ORDER — SODIUM CHLORIDE 9 MG/ML
1000 INJECTION, SOLUTION INTRAVENOUS
Status: COMPLETED | OUTPATIENT
Start: 2019-02-28 | End: 2019-03-01

## 2019-02-28 RX ORDER — ACETAMINOPHEN 325 MG/1
650 TABLET ORAL
Status: COMPLETED | OUTPATIENT
Start: 2019-02-28 | End: 2019-02-28

## 2019-02-28 RX ORDER — MEDROXYPROGESTERONE ACETATE 2.5 MG/1
20 TABLET ORAL EVERY 6 HOURS
Status: DISCONTINUED | OUTPATIENT
Start: 2019-03-01 | End: 2019-03-01 | Stop reason: HOSPADM

## 2019-02-28 RX ADMIN — IOHEXOL 100 ML: 350 INJECTION, SOLUTION INTRAVENOUS at 05:02

## 2019-02-28 RX ADMIN — ACETAMINOPHEN 650 MG: 325 TABLET ORAL at 04:02

## 2019-02-28 RX ADMIN — SODIUM CHLORIDE 1000 ML: 0.9 INJECTION, SOLUTION INTRAVENOUS at 06:02

## 2019-02-28 RX ADMIN — MEDROXYPROGESTERONE ACETATE 20 MG: 2.5 TABLET ORAL at 08:02

## 2019-02-28 RX ADMIN — SODIUM CHLORIDE 500 ML: 0.9 INJECTION, SOLUTION INTRAVENOUS at 04:02

## 2019-02-28 NOTE — ED NOTES
Pt c/o increased vaginal bleeding. Pt cleaned up at this time. Moderate amount of bleeding with large clots noted. RAHEEL Burris notified at this time. Daughter at bedside. Will continue to monitor.

## 2019-02-28 NOTE — ED PROVIDER NOTES
"Encounter Date: 2/28/2019       History     Chief Complaint   Patient presents with    Vaginal Bleeding     Pt reports snowin diagnosed with cervical cancer and states she brgan bleeding from vagina this morning. Pt reports lower abdominal pain that feels like cramps.     74yo female presents to the ED with her daughter for vaginal bleeding.  Pt was diagnosed with cervical cancer in June at [facility].  Pt states that she believes "God is in control" and is not getting treatment.  Her doctor has left the practice and she is having difficulty obtaining followup due to lack of insurance.  She initially went to [place] to be with family but then return to [place] for help from her daughter.  Today she developed vaginal bleeding and is passing clots.  Pt's daughter reports the bleeding started around 1345 and has been constant and heavy since that time.  No trauma or anticoagulant use.  Pt reports pelvic pain which feels like constant cramping.  No other complaints at this time.       The history is provided by the patient. The history is limited by a language barrier. A  was used (Pt's daughter, Larissa).     Review of patient's allergies indicates:   Allergen Reactions    Morphine      Past Medical History:   Diagnosis Date    Cervical cancer     Diabetes mellitus     Hypertension      Past Surgical History:   Procedure Laterality Date    CARDIAC PACEMAKER PLACEMENT       History reviewed. No pertinent family history.  Social History     Tobacco Use    Smoking status: Never Smoker    Smokeless tobacco: Never Used   Substance Use Topics    Alcohol use: Not on file    Drug use: Not on file     Review of Systems   Constitutional: Positive for activity change and fatigue. Negative for chills and fever.   HENT: Negative for congestion.    Respiratory: Negative for cough and shortness of breath.    Cardiovascular: Negative for chest pain.   Gastrointestinal: Negative for abdominal pain, diarrhea, nausea " and vomiting.   Genitourinary: Positive for vaginal bleeding. Negative for dysuria and vaginal discharge.   Skin: Negative for rash.   Neurological: Positive for weakness (generalized. ). Negative for headaches.   Hematological: Does not bruise/bleed easily.   Psychiatric/Behavioral: Negative for confusion.   All other systems reviewed and are negative.      Physical Exam     Initial Vitals [02/28/19 1432]   BP Pulse Resp Temp SpO2   (!) 153/82 82 19 97.7 °F (36.5 °C) 97 %      MAP       --         Physical Exam    Nursing note and vitals reviewed.  Constitutional: Vital signs are normal. She appears well-developed and well-nourished. She is Obese . She is active and cooperative. She is easily aroused.  Non-toxic appearance. She does not have a sickly appearance. She appears ill. No distress.   HENT:   Head: Normocephalic and atraumatic.   Eyes: Conjunctivae and EOM are normal.   Neck: Normal range of motion. Neck supple.   Cardiovascular: Normal rate, regular rhythm and normal heart sounds.   Pulmonary/Chest: Effort normal and breath sounds normal.   Abdominal: Soft. Normal appearance and bowel sounds are normal. Hernia confirmed negative in the right inguinal area and confirmed negative in the left inguinal area.   Genitourinary: Pelvic exam was performed with patient supine. No labial fusion. There is no rash, tenderness, lesion or injury on the right labia. There is no rash, tenderness, lesion or injury on the left labia.   Genitourinary Comments: Moderate amount of bright red blood with large clots from vagina.    Lymphadenopathy:        Right: No inguinal adenopathy present.        Left: No inguinal adenopathy present.   Neurological: She is alert, oriented to person, place, and time and easily aroused. She has normal strength. GCS eye subscore is 4. GCS verbal subscore is 5. GCS motor subscore is 6.   Skin: Skin is warm, dry and intact. Capillary refill takes less than 2 seconds. No bruising and no rash  noted. No erythema. No pallor.   Psychiatric: She has a normal mood and affect. Her speech is normal and behavior is normal. Judgment and thought content normal. Cognition and memory are normal.         ED Course   Procedures  Labs Reviewed   CBC W/ AUTO DIFFERENTIAL - Abnormal; Notable for the following components:       Result Value    MCHC 31.4 (*)     All other components within normal limits   COMPREHENSIVE METABOLIC PANEL - Abnormal; Notable for the following components:    CO2 33 (*)     BUN, Bld 40 (*)     Creatinine 1.5 (*)     eGFR if  40 (*)     eGFR if non  35 (*)     All other components within normal limits   URINALYSIS - Abnormal; Notable for the following components:    Protein, UA Trace (*)     All other components within normal limits   PROTIME-INR   APTT   CBC W/ AUTO DIFFERENTIAL   APTT   PROTIME-INR   TYPE & SCREEN   POCT GLUCOSE MONITORING CONTINUOUS          Imaging Results          CT Pelvis W Wo  Contrast (Final result)  Result time 02/28/19 17:29:07    Final result by Reta Vargas MD (02/28/19 17:29:07)                 Impression:      1. Enlarged lobular uterus with 5.5 cm intramural lesion suggestive for fibroid, however other potential neoplastic process not excluded in this patient with reported clinical history of uterine neoplasm.  2. Right adnexal/ovarian 4.2 cm cystic lesion.  Differential considerations include ovarian cyst/complex cyst, paraovarian cyst, or cystic neoplasm.      Electronically signed by: Reta Vargas MD  Date:    02/28/2019  Time:    17:29             Narrative:    EXAMINATION:  CT PELVIS WITH AND WITHOUT CONTRAST    CLINICAL HISTORY:  Neoplasm: uterine, suspected;    TECHNIQUE:  CT of the pelvis was performed before and after administration of 100 cc Omnipaque 350 IV contrast.    COMPARISON:  None    FINDINGS:  Uterus is enlarged and heterogenous in appearance with lobular contour in this postmenopausal patient.  There is  5.5 cm intramural lesion, suspected fibroid seen.  Right adnexal/ovarian 4.2 cm cystic lesion is visualized which measures higher than simple fluid density.  Visualized loops of bowel show no evidence of obstruction or inflammation.  Urinary bladder is unremarkable.  No acute osseous abnormality identified.                                 Medical Decision Making:   Initial Assessment:   73-year-old female here for vaginal bleeding.  Patient has known cervical cancer and is not currently obtaining treatment.  No anticoagulant.  Patient appears well, nontoxic.  Vitals stable. She has a large amount of vaginal bleeding with clots.  Differential Diagnosis:   Neoplasm, coagulopathy, anemia  Clinical Tests:   Lab Tests: Ordered and Reviewed  Radiological Study: Ordered and Reviewed  Medical Tests: Ordered and Reviewed  ED Management:  Labs, IV fluids, CT pelvis, tylenol  Other:   I have discussed this case with another health care provider.       <> Summary of the Discussion: Discussed with - will see in the ED.     Discussed with , who will accept the patient to 8th floor. Advised placing moore and vaginal packing.   Dr. Benton did evaluate this patient in the ED.  He advised CT scan with and without contrast of the pelvis.  He will fully evaluate this patient in the ED once he returns from other emergent patients.    1830:   back in ED.  Feels it is most appropriate for this patient to be transferred to [facility] or [facility] for consultation with Gyn-onc.  CT shows a pelvic mas, suspicious for neoplasm but could be fibriod.  Pt continues to bleed at same rate. HDS, /78.  HR 78. Will repeat CBC and coagulation.   Vagina packed with Kerlex with help of .     Pt will be accepted at [facility]- .                       Clinical Impression:       ICD-10-CM ICD-9-CM   1. Vaginal bleeding N93.9 623.8   2. Uterine mass N85.9 625.8                                iBrdie Contreras, [place]  02/28/19  1918       Birdie Contreras, [facility]  02/28/19 1928

## 2019-02-28 NOTE — ED NOTES
Pt presented to er via ems with c/o vaginal bleeding since earlier this morning. Pt was diagnosed with cervical cancer and has not received treatment because she does not agree with diagnosis. No acute distress noted. Will continue to monitor.

## 2019-03-01 ENCOUNTER — HOSPITAL ENCOUNTER (OUTPATIENT)
Facility: HOSPITAL | Age: 74
Discharge: HOSPICE | End: 2019-03-04
Attending: OBSTETRICS & GYNECOLOGY | Admitting: OBSTETRICS & GYNECOLOGY
Payer: MEDICARE

## 2019-03-01 VITALS
DIASTOLIC BLOOD PRESSURE: 77 MMHG | RESPIRATION RATE: 18 BRPM | OXYGEN SATURATION: 97 % | SYSTOLIC BLOOD PRESSURE: 165 MMHG | BODY MASS INDEX: 41.83 KG/M2 | HEIGHT: 64 IN | WEIGHT: 245 LBS | HEART RATE: 77 BPM | TEMPERATURE: 98 F

## 2019-03-01 DIAGNOSIS — N93.9 VAGINAL BLEEDING: ICD-10-CM

## 2019-03-01 DIAGNOSIS — R10.9 ABDOMINAL PAIN, UNSPECIFIED ABDOMINAL LOCATION: Primary | ICD-10-CM

## 2019-03-01 DIAGNOSIS — C54.1 ENDOMETRIAL CANCER: Primary | ICD-10-CM

## 2019-03-01 LAB
BASOPHILS # BLD AUTO: 0.02 K/UL
BASOPHILS NFR BLD: 0.3 %
DIFFERENTIAL METHOD: ABNORMAL
EOSINOPHIL # BLD AUTO: 0.2 K/UL
EOSINOPHIL NFR BLD: 2.5 %
ERYTHROCYTE [DISTWIDTH] IN BLOOD BY AUTOMATED COUNT: 14.1 %
HCT VFR BLD AUTO: 35.6 %
HGB BLD-MCNC: 10.9 G/DL
IMM GRANULOCYTES # BLD AUTO: 0.01 K/UL
IMM GRANULOCYTES NFR BLD AUTO: 0.2 %
LYMPHOCYTES # BLD AUTO: 1.4 K/UL
LYMPHOCYTES NFR BLD: 23.6 %
MCH RBC QN AUTO: 29.4 PG
MCHC RBC AUTO-ENTMCNC: 30.6 G/DL
MCV RBC AUTO: 96 FL
MONOCYTES # BLD AUTO: 0.3 K/UL
MONOCYTES NFR BLD: 5.1 %
NEUTROPHILS # BLD AUTO: 4.1 K/UL
NEUTROPHILS NFR BLD: 68.3 %
NRBC BLD-RTO: 0 /100 WBC
PLATELET # BLD AUTO: 208 K/UL
PMV BLD AUTO: 11.5 FL
POCT GLUCOSE: 119 MG/DL (ref 70–110)
RBC # BLD AUTO: 3.71 M/UL
WBC # BLD AUTO: 6.05 K/UL

## 2019-03-01 PROCEDURE — 99220 PR INITIAL OBSERVATION CARE,LEVL III: ICD-10-PCS | Mod: GV,,, | Performed by: OBSTETRICS & GYNECOLOGY

## 2019-03-01 PROCEDURE — 96361 HYDRATE IV INFUSION ADD-ON: CPT

## 2019-03-01 PROCEDURE — 27000221 HC OXYGEN, UP TO 24 HOURS

## 2019-03-01 PROCEDURE — 77295 3-D RADIOTHERAPY PLAN: CPT | Mod: TC | Performed by: RADIOLOGY

## 2019-03-01 PROCEDURE — 94761 N-INVAS EAR/PLS OXIMETRY MLT: CPT

## 2019-03-01 PROCEDURE — 77334 RADIATION TREATMENT AID(S): CPT | Mod: 26,,, | Performed by: RADIOLOGY

## 2019-03-01 PROCEDURE — 77290 THER RAD SIMULAJ FIELD CPLX: CPT | Mod: TC | Performed by: RADIOLOGY

## 2019-03-01 PROCEDURE — 77300 RADIATION THERAPY DOSE PLAN: CPT | Mod: TC | Performed by: RADIOLOGY

## 2019-03-01 PROCEDURE — 25000003 PHARM REV CODE 250: Performed by: STUDENT IN AN ORGANIZED HEALTH CARE EDUCATION/TRAINING PROGRAM

## 2019-03-01 PROCEDURE — 25000003 PHARM REV CODE 250: Performed by: NURSE PRACTITIONER

## 2019-03-01 PROCEDURE — 99220 PR INITIAL OBSERVATION CARE,LEVL III: CPT | Mod: GV,,, | Performed by: OBSTETRICS & GYNECOLOGY

## 2019-03-01 PROCEDURE — 77263 PR  RADIATION THERAPY PLAN COMPLEX: ICD-10-PCS | Mod: ,,, | Performed by: RADIOLOGY

## 2019-03-01 PROCEDURE — 36415 COLL VENOUS BLD VENIPUNCTURE: CPT

## 2019-03-01 PROCEDURE — G0378 HOSPITAL OBSERVATION PER HR: HCPCS

## 2019-03-01 PROCEDURE — G0379 DIRECT REFER HOSPITAL OBSERV: HCPCS

## 2019-03-01 PROCEDURE — 77334 RADIATION TREATMENT AID(S): CPT | Mod: TC | Performed by: RADIOLOGY

## 2019-03-01 PROCEDURE — 25000003 PHARM REV CODE 250: Performed by: OBSTETRICS & GYNECOLOGY

## 2019-03-01 PROCEDURE — 77300 PR RADIATION THERAPY,DOSIMETRY PLAN: ICD-10-PCS | Mod: 26,,, | Performed by: RADIOLOGY

## 2019-03-01 PROCEDURE — 77014 HC CT GUIDANCE RADIATION THERAPY FLDS PLACEMENT: CPT | Mod: TC | Performed by: RADIOLOGY

## 2019-03-01 PROCEDURE — 85025 COMPLETE CBC W/AUTO DIFF WBC: CPT

## 2019-03-01 PROCEDURE — 77334 PR  RADN TREATMENT AID(S) COMPLX: ICD-10-PCS | Mod: 26,,, | Performed by: RADIOLOGY

## 2019-03-01 PROCEDURE — 99204 OFFICE O/P NEW MOD 45 MIN: CPT | Mod: 25,,, | Performed by: RADIOLOGY

## 2019-03-01 PROCEDURE — 77295 PR 3D RADIOTHERAPY PLAN: ICD-10-PCS | Mod: 26,,, | Performed by: RADIOLOGY

## 2019-03-01 PROCEDURE — S5010 5% DEXTROSE AND 0.45% SALINE: HCPCS | Performed by: OBSTETRICS & GYNECOLOGY

## 2019-03-01 PROCEDURE — 77300 RADIATION THERAPY DOSE PLAN: CPT | Mod: 26,,, | Performed by: RADIOLOGY

## 2019-03-01 PROCEDURE — 77263 THER RADIOLOGY TX PLNG CPLX: CPT | Mod: ,,, | Performed by: RADIOLOGY

## 2019-03-01 PROCEDURE — 99204 PR OFFICE/OUTPT VISIT, NEW, LEVL IV, 45-59 MIN: ICD-10-PCS | Mod: 25,,, | Performed by: RADIOLOGY

## 2019-03-01 PROCEDURE — 77295 3-D RADIOTHERAPY PLAN: CPT | Mod: 26,,, | Performed by: RADIOLOGY

## 2019-03-01 RX ORDER — ACETAMINOPHEN 325 MG/1
650 TABLET ORAL EVERY 6 HOURS
Status: DISCONTINUED | OUTPATIENT
Start: 2019-03-01 | End: 2019-03-01

## 2019-03-01 RX ORDER — LISINOPRIL 10 MG/1
10 TABLET ORAL DAILY
Status: DISCONTINUED | OUTPATIENT
Start: 2019-03-01 | End: 2019-03-04 | Stop reason: HOSPADM

## 2019-03-01 RX ORDER — IBUPROFEN 400 MG/1
400 TABLET ORAL EVERY 6 HOURS
Status: DISCONTINUED | OUTPATIENT
Start: 2019-03-01 | End: 2019-03-04 | Stop reason: HOSPADM

## 2019-03-01 RX ORDER — HYDROXYZINE PAMOATE 25 MG/1
25 CAPSULE ORAL EVERY 8 HOURS PRN
Status: DISCONTINUED | OUTPATIENT
Start: 2019-03-01 | End: 2019-03-04 | Stop reason: HOSPADM

## 2019-03-01 RX ORDER — HYDROCODONE BITARTRATE AND ACETAMINOPHEN 5; 325 MG/1; MG/1
1 TABLET ORAL EVERY 6 HOURS PRN
Status: DISCONTINUED | OUTPATIENT
Start: 2019-03-01 | End: 2019-03-04 | Stop reason: HOSPADM

## 2019-03-01 RX ORDER — BENZONATATE 100 MG/1
100 CAPSULE ORAL 3 TIMES DAILY PRN
Status: DISCONTINUED | OUTPATIENT
Start: 2019-03-01 | End: 2019-03-04 | Stop reason: HOSPADM

## 2019-03-01 RX ORDER — SODIUM CHLORIDE 0.9 % (FLUSH) 0.9 %
3 SYRINGE (ML) INJECTION
Status: DISCONTINUED | OUTPATIENT
Start: 2019-03-01 | End: 2019-03-04 | Stop reason: HOSPADM

## 2019-03-01 RX ORDER — DEXTROSE MONOHYDRATE AND SODIUM CHLORIDE 5; .45 G/100ML; G/100ML
INJECTION, SOLUTION INTRAVENOUS CONTINUOUS
Status: DISCONTINUED | OUTPATIENT
Start: 2019-03-01 | End: 2019-03-02

## 2019-03-01 RX ADMIN — BENZONATATE 100 MG: 100 CAPSULE ORAL at 11:03

## 2019-03-01 RX ADMIN — LISINOPRIL 10 MG: 10 TABLET ORAL at 03:03

## 2019-03-01 RX ADMIN — IBUPROFEN 400 MG: 400 TABLET, FILM COATED ORAL at 11:03

## 2019-03-01 RX ADMIN — IBUPROFEN 400 MG: 400 TABLET, FILM COATED ORAL at 12:03

## 2019-03-01 RX ADMIN — IBUPROFEN 400 MG: 400 TABLET, FILM COATED ORAL at 06:03

## 2019-03-01 RX ADMIN — MEDROXYPROGESTERONE ACETATE 20 MG: 2.5 TABLET ORAL at 01:03

## 2019-03-01 RX ADMIN — ACETAMINOPHEN 650 MG: 325 TABLET ORAL at 06:03

## 2019-03-01 RX ADMIN — DEXTROSE AND SODIUM CHLORIDE: 5; .45 INJECTION, SOLUTION INTRAVENOUS at 06:03

## 2019-03-01 RX ADMIN — DEXTROSE AND SODIUM CHLORIDE: 5; .45 INJECTION, SOLUTION INTRAVENOUS at 12:03

## 2019-03-01 RX ADMIN — HYDROCODONE BITARTRATE AND ACETAMINOPHEN 1 TABLET: 5; 325 TABLET ORAL at 11:03

## 2019-03-01 RX ADMIN — ACETAMINOPHEN 650 MG: 325 TABLET ORAL at 12:03

## 2019-03-01 NOTE — CONSULTS
[facility]  Radiation Oncology  Consult Note    Patient Name: Franklin Christian  MRN: [id number]  Admission Date: 3/1/2019  Hospital Length of Stay: 0 days  Code Status: Full Code   Attending Provider: Sara Kat MD  Consulting Provider: Yury Rogers MD  Primary Care Physician: Primary Doctor No  Principal Problem:Vaginal bleeding    Inpatient consult to Radiation Oncology  Consult performed by: Yury Rogers MD  Consult ordered by: Guido Parsons MD        Subjective:     HPI:  This is a 74 y/o female with vaginal bleeding secondary to endometrial cancer. History is obtained per EMR and patient/daughter. She reportedly was diagnosed with endometrial cancer in Summer 2018 but elected no treatment. Per her daughter she has been doing well at home over the past few months with some weight loss and increased mobility. Over the past 1-2 days she began having heavy vaginal bleeding with clots. She was transferred to [facility] for higher level of care. CT Pelvis 2/28/19 demonstrated enlarged uterus but no evidence of metastases; her stage is not clear from EMR. Per Gyn Onc team she is not a candidate for surgery or chemotherapy. Radiation Oncology is consulted for consideration of palliative RT.     Oncology Treatment Plan:   [No treatment plan]    Current Facility-Administered Medications   Medication    acetaminophen tablet 650 mg    dextrose 5 % and 0.45 % NaCl infusion    hydrOXYzine pamoate capsule 25 mg    ibuprofen tablet 400 mg    lisinopril tablet 10 mg    sodium chloride 0.9% flush 3 mL       Review of patient's allergies indicates:   Allergen Reactions    Morphine         Past Medical History:   Diagnosis Date    Cervical cancer     Diabetes mellitus     Hypertension      Past Surgical History:   Procedure Laterality Date    CARDIAC PACEMAKER PLACEMENT       Family History     None        Tobacco Use    Smoking status: Never Smoker    Smokeless tobacco: Never Used    Substance and Sexual Activity    Alcohol use: Not on file    Drug use: Not on file    Sexual activity: Not on file       Review of Systems  Objective:     Vital Signs (Most Recent):  Temp: 98.5 °F (36.9 °C) (03/01/19 1146)  Pulse: 71 (03/01/19 1146)  Resp: 20 (03/01/19 1146)  BP: (!) 162/71 (03/01/19 1146)  SpO2: (!) 94 % (03/01/19 1146) Vital Signs (24h Range):  Temp:  [97.8 °F (36.6 °C)-98.5 °F (36.9 °C)] 98.5 °F (36.9 °C)  Pulse:  [68-82] 71  Resp:  [9-31] 20  SpO2:  [94 %-100 %] 94 %  BP: (130-196)/(62-86) 162/71        Body mass index is 42.05 kg/m².  Body surface area is 2.24 meters squared.    Physical Exam   Constitutional: She is oriented to person, place, and time. She appears well-developed and well-nourished.   obese   HENT:   Head: Normocephalic and atraumatic.   Mouth/Throat: Mucous membranes are normal.   Eyes: EOM are normal. No scleral icterus.   Neck: Normal range of motion. Neck supple.   Pulmonary/Chest: Effort normal. No accessory muscle usage. No respiratory distress.   Abdominal: Soft. Normal appearance.   Musculoskeletal: Normal range of motion. She exhibits no edema.   Lymphadenopathy:        Right: No supraclavicular adenopathy present.        Left: No supraclavicular adenopathy present.   Neurological: She is alert and oriented to person, place, and time. No cranial nerve deficit.   Skin: Skin is warm. No rash noted. No cyanosis.   Psychiatric: She has a normal mood and affect. Her speech is normal. Judgment normal.   Vitals reviewed.      Significant Labs:   CBC:   Recent Labs   Lab 02/28/19  1558 02/28/19  1938 03/01/19  0547   WBC 6.59 6.13 6.05   HGB 13.3 12.0 10.9*   HCT 42.3 38.8 35.6*    222 208       Diagnostic Results:  I have reviewed all pertinent imaging results/findings within the past 24 hours.    Assessment/Plan:     * Vaginal bleeding    This is a 72 y/o female with vaginal bleeding secondary to endometrial adenocarcinoma (not staged). She was diagnosed  originally in Summer 2018 but has declined treatment. Presents now with 1-2 days of heavy bleeding. Radiation Oncology is consulted for consideration of palliative RT.     I discussed the role of radiation in palliation of vaginal bleeding from tumors, with 70-80% of patients getting relief. I recommend treating the uterus/cervix with margin to 8 Gy x1 with re-assessment in 1 week for consideration of additional treatment. Potential short- and long-term side effects of treatment were reviewed. She was in agreement with the treatment plan.          Thank you for your consult. I will follow-up with patient. Please contact us if you have any additional questions.    Yury Rogers MD  Radiation Oncology  [facility]

## 2019-03-01 NOTE — SUBJECTIVE & OBJECTIVE
Oncology Treatment Plan:   [No treatment plan]    Current Facility-Administered Medications   Medication    acetaminophen tablet 650 mg    dextrose 5 % and 0.45 % NaCl infusion    hydrOXYzine pamoate capsule 25 mg    ibuprofen tablet 400 mg    lisinopril tablet 10 mg    sodium chloride 0.9% flush 3 mL       Review of patient's allergies indicates:   Allergen Reactions    Morphine         Past Medical History:   Diagnosis Date    Cervical cancer     Diabetes mellitus     Hypertension      Past Surgical History:   Procedure Laterality Date    CARDIAC PACEMAKER PLACEMENT       Family History     None        Tobacco Use    Smoking status: Never Smoker    Smokeless tobacco: Never Used   Substance and Sexual Activity    Alcohol use: Not on file    Drug use: Not on file    Sexual activity: Not on file       Review of Systems  Objective:     Vital Signs (Most Recent):  Temp: 98.5 °F (36.9 °C) (03/01/19 1146)  Pulse: 71 (03/01/19 1146)  Resp: 20 (03/01/19 1146)  BP: (!) 162/71 (03/01/19 1146)  SpO2: (!) 94 % (03/01/19 1146) Vital Signs (24h Range):  Temp:  [97.8 °F (36.6 °C)-98.5 °F (36.9 °C)] 98.5 °F (36.9 °C)  Pulse:  [68-82] 71  Resp:  [9-31] 20  SpO2:  [94 %-100 %] 94 %  BP: (130-196)/(62-86) 162/71        Body mass index is 42.05 kg/m².  Body surface area is 2.24 meters squared.    Physical Exam   Constitutional: She is oriented to person, place, and time. She appears well-developed and well-nourished.   obese   HENT:   Head: Normocephalic and atraumatic.   Mouth/Throat: Mucous membranes are normal.   Eyes: EOM are normal. No scleral icterus.   Neck: Normal range of motion. Neck supple.   Pulmonary/Chest: Effort normal. No accessory muscle usage. No respiratory distress.   Abdominal: Soft. Normal appearance.   Musculoskeletal: Normal range of motion. She exhibits no edema.   Lymphadenopathy:        Right: No supraclavicular adenopathy present.        Left: No supraclavicular adenopathy present.    Neurological: She is alert and oriented to person, place, and time. No cranial nerve deficit.   Skin: Skin is warm. No rash noted. No cyanosis.   Psychiatric: She has a normal mood and affect. Her speech is normal. Judgment normal.   Vitals reviewed.      Significant Labs:   CBC:   Recent Labs   Lab 02/28/19  1558 02/28/19  1938 03/01/19  0547   WBC 6.59 6.13 6.05   HGB 13.3 12.0 10.9*   HCT 42.3 38.8 35.6*    222 208       Diagnostic Results:  I have reviewed all pertinent imaging results/findings within the past 24 hours.

## 2019-03-01 NOTE — PLAN OF CARE
"Patient accepted as a transfer from [facility]. Patient presented to [facility] c/o heavy vaginal bleeding with clots. Imaging results per Dr. Benton- "possible fibroids but uterine malignancy can not be ruled out". Frequent lab checks. VSS. T max 98.4. O2 sats % on room air. IVF: dextrose 5% and 0.45% NaCl infusion at 125 mL/hr IV continuous. Davis catheter in place. Labs stable. WBC 6.05, H/H 10.9/35.6, Plt 208, INR 1.0, Na 142, K 4.5, BUN/Crt 40/1.5. CM discussed patient with Dr. Parsons; plans for patient TBD at this time. CM discussed patient status with the . No discharge needs identified at this time. CM to continue to follow with the team.    Magui Hilton, RN, BSN, CM  [facility]  Nurse - Med Onc/Gyn Onc  [phone]    "

## 2019-03-01 NOTE — PROGRESS NOTES
Patient is 72yo with complicated medical history. Started bleeding heavily today and came to [facility] ER.Passing large fresh clots per vagina. CT ordered; ---`looks like fibroids but   Uterine malignancy cannot be excluded. Initial labs are good. Recommend transport to [facility] for medical management and blood replacement with close monitoring;GYN-ONC consultation to settle question of malignancy diagnosed at [facility].Transfer was accepted and patient will be transported.

## 2019-03-01 NOTE — HOSPITAL COURSE
"Patient was accepted as a transfer from [facility], and arrived on 03/01/2019. Patient presented to [facility] with CC of heavy vaginal bleeding with clots. Imaging results per Dr. Benton report "possible fibroids but uterine malignancy can not be ruled out." At OSH, patient was having significant vaginal bleeding but refused surgical intervention so vaginal packing was inserted and she was transferred to [facility].       "

## 2019-03-01 NOTE — ASSESSMENT & PLAN NOTE
This is a 72 y/o female with vaginal bleeding secondary to endometrial adenocarcinoma (not staged). She was diagnosed originally in Summer 2018 but has declined treatment. Presents now with 1-2 days of heavy bleeding. Radiation Oncology is consulted for consideration of palliative RT.     I discussed the role of radiation in palliation of vaginal bleeding from tumors, with 70-80% of patients getting relief. I recommend treating the uterus/cervix with margin to 8 Gy x1 with re-assessment in 1 week for consideration of additional treatment. Potential short- and long-term side effects of treatment were reviewed. She was in agreement with the treatment plan.

## 2019-03-01 NOTE — H&P
[facility]  Gynecologic Oncology  H&P    Patient Name: Franklin Christian  MRN: [id number]  Admission Date: 3/1/2019  Primary Care Provider: Primary Doctor No   Principal Problem: Vaginal bleeding    Subjective:     Chief Complaint/Reason for Admission: Vaginal bleeding    History of Present Illness:   Franklin Christian is a 73 y.o.   female who is admitted as a [place] from [facility]. She initially presented with her daughter, Tiana, with CC of heavy vaginal bleeding and passage of large blood clots. She reports she was diagnosed with cervical cancer in 2018 but has not been treated.    Prior to the diagnosis, she was having vaginal spotting a few times a month for the past several months. She was to have follow up at a cancer center near , but she decided to move to [place] (where she has lived since moving to the ) to be close to most of her family. She was seen once in [place], but reports she did not follow up. She has since moved back to [place] to be back with her daughter Tiana.     Heavy vaginal bleeding, which has never happened to her before, started on . When she arrived at [facility], she had moore catheter inserted and a packing placed in the vagina. She was transferred to [facility] for Gynecology Oncology care. On arrival, she reports packing fell out during transfer from the OSH. She is still bleeding, but it is much lighter and she has not passed clots today. She is voiding via Moore catheter and reports bowel function is at baseline. She complains of lower abdominal/pelvic pain that has been present for several weeks now. She denies decreased appetite, weight loss, nausea/vomiting, increased abdominal size, changes in baseline mentation and functional status recently. Other than pain, she complains of severe and constant itching.     OB Gyn history significant for regular, normal-flow periods since menarche and through menopause. Not sure of age of menopause. She had six  "NSVDs without complications, and has 5 living children. She denies h/o breast, colon, uterine, cervical, or ovarian cancer. She denies known h/o abnormal pap mere until cancer diagnosis.    PMH significant for asthma, COPD (quit smoking cigarettes 28 years ago) dependent on 2L NC at all times, hypertension, type 2 diabetes, CHF with pacemaker in place since 2012, chronic renal failure, and several strokes, most recently in 2018.     Per her daughter, her mother's "normal routine" is to spend 22 of 24 hours every day in bed. She gets up in the morning and watches TV. She is independent in dressing and bathing and eating, but she does not cook. She does not use any assistive devices at home. She is typically oriented, but is very forgetful, and at times gets confused about where she is. She experienced severe delirium after being given morphine at some point in the recent past.     Past Medical History:   Diagnosis Date    Cervical cancer     Diabetes mellitus     Hypertension      Past Surgical History:   Procedure Laterality Date    CARDIAC PACEMAKER PLACEMENT       Family History     None        Tobacco Use    Smoking status: Never Smoker    Smokeless tobacco: Never Used   Substance and Sexual Activity    Alcohol use: Not on file    Drug use: Not on file    Sexual activity: Not on file       PTA Medications   Medication Sig    lisinopril 10 MG tablet Take 1 tablet (10 mg total) by mouth once daily.       Review of patient's allergies indicates:   Allergen Reactions    Morphine      Review of Systems   Constitutional: Positive for fatigue (chronic). Negative for activity change, appetite change and fever.   Respiratory: Negative for cough and shortness of breath.    Cardiovascular: Negative for chest pain and leg swelling.   Gastrointestinal: Positive for constipation (strains for a long time in order to have BM) and diarrhea (constipation alternates with diarrhea and urgency). Negative for abdominal " "pain, blood in stool, nausea and vomiting.   Endocrine: Positive for diabetes. Negative for hair loss and hot flashes.   Genitourinary: Positive for pelvic pain (across pelvis, gestures below panus), vaginal bleeding, vaginal pain (and sense of fullness and "everything falling out") and postmenopausal bleeding. Negative for bladder incontinence, dysuria, frequency, menorrhagia, menstrual problem, urgency, postcoital bleeding and vaginal odor.   Integumentary:  Negative for breast mass, nipple discharge and breast skin changes.   Hematological: Negative for adenopathy.   Psychiatric/Behavioral: The patient is not nervous/anxious.    Breast: Negative for mass, mastodynia, nipple discharge and skin changes     Objective:     Vital Signs (Most Recent):  Temp: 98.5 °F (36.9 °C) (03/01/19 1146)  Pulse: 71 (03/01/19 1146)  Resp: 20 (03/01/19 1146)  BP: (!) 162/71 (03/01/19 1146)  SpO2: (!) 94 % (03/01/19 1146) Vital Signs (24h Range):  Temp:  [97.7 °F (36.5 °C)-98.5 °F (36.9 °C)] 98.5 °F (36.9 °C)  Pulse:  [68-82] 71  Resp:  [9-31] 20  SpO2:  [94 %-100 %] 94 %  BP: (130-196)/(62-86) 162/71        Body mass index is 42.05 kg/m².    Physical Exam:   Constitutional: She is oriented to person, place, and time. She appears well-developed and well-nourished. No distress.    HENT:   Head: Normocephalic and atraumatic.   Mouth/Throat: Oropharynx is clear and moist.    Eyes: Conjunctivae and EOM are normal. Pupils are equal, round, and reactive to light. No scleral icterus.    Neck: Normal range of motion. Neck supple. No thyromegaly present.    Cardiovascular: Normal rate, regular rhythm, normal heart sounds and intact distal pulses.  Exam reveals no clubbing and no cyanosis.     Pulmonary/Chest: Effort normal. No respiratory distress. She has no wheezes.        Abdominal: Soft. Bowel sounds are normal. She exhibits no distension and no mass (no palpable mass, although body habitus limits exam). There is tenderness (tender to " palpation under lower panus over the pelvis). There is no guarding.     Genitourinary:   Genitourinary Comments: Vulva and perineum normal appearing without lesions, rash, or loss of normal structures. There is red blood on the inner thighs, mons, and at the introitus. Vaginal mucosa is atrophic. Exam significantly limited due to body habitus as well as patient discomfort. No palpable masses in bilateral adnexa. Uterine fundus not palpable due to obesity. Ovaries not palpated. Cervix is midline, feels small with irregular surface and a small projection of friable tissue on the left. No cervical tenderness, but significant diffuse tenderness to bimanual palpation.           Musculoskeletal: Moves all extremeties.      Lymphadenopathy:     She has no cervical adenopathy.    Neurological: She is alert and oriented to person, place, and time.    Skin: She is not diaphoretic. No cyanosis. Nails show no clubbing.    Psychiatric: She has a normal mood and affect.     Laboratory:  CBC:   Recent Labs   Lab 02/28/19  1558 02/28/19  1938 03/01/19  0547   WBC 6.59 6.13 6.05   HGB 13.3 12.0 10.9*   HCT 42.3 38.8 35.6*    222 208    and All pertinent labs from the last 24 hours have been reviewed.    Diagnostic Results:  Labs: Reviewed  CT PELVIS WITH AND WITHOUT CONTRAST    CLINICAL HISTORY:  Neoplasm: uterine, suspected;    TECHNIQUE:  CT of the pelvis was performed before and after administration of 100 cc Omnipaque 350 IV contrast.    COMPARISON:  None    FINDINGS:  Uterus is enlarged and heterogenous in appearance with lobular contour in this postmenopausal patient.  There is 5.5 cm intramural lesion, suspected fibroid seen.  Right adnexal/ovarian 4.2 cm cystic lesion is visualized which measures higher than simple fluid density.  Visualized loops of bowel show no evidence of obstruction or inflammation.  Urinary bladder is unremarkable.  No acute osseous abnormality identified.      Impression       1. Enlarged  lobular uterus with 5.5 cm intramural lesion suggestive for fibroid, however other potential neoplastic process not excluded in this patient with reported clinical history of uterine neoplasm.  2. Right adnexal/ovarian 4.2 cm cystic lesion.  Differential considerations include ovarian cyst/complex cyst, paraovarian cyst, or cystic neoplasm.      Electronically signed by: Reta Vargas MD  Date: 02/28/2019  Time: 17:29       Assessment/Plan:     Active Diagnoses:    Diagnosis Date Noted POA    PRINCIPAL PROBLEM:  Vaginal bleeding [N93.9] 02/28/2019 Yes      Problems Resolved During this Admission:     1. Endometrial cancer  - Per patient and her daughter, cervical cancer was diagnosed by pap and biopsy  - Records obtained from [facility] today; now scanned into media and copy in paper chart. On 6/21/18 Pap shows endometrial adenocarcinoma, HPV negative. Endometrial biopsy shows at least endometrial hyperplasia with atypia, cannot rule out adenocarcinoma.   - CT shows enlarged uterus with 5.5 cm intramural structure, as well as right adnexal 4.2 cm cystic lesion   - Patient very poor candidate for surgery/chemo  - Discussed with Dr proctor, daughter and patient - only able to offer palliative radiation to control vaginal bleeding  - Patient wishes to proceed with radiation  - Rad onc consulted today  - Scheduled motrin/tylenol for pain, as patient has had delirium 2/2 opiate medications in the past. If insufficient will consider escalating pain regimen.     2. Vaginal Bleeding  - Likely uterine, 2/2 endometrial adenocarcinoma  - Had vag pack in at OSH but none currently  - will d/c moore catheter now that packing is out  - Currently light to moderate, not heavy and not passing large clots anymore  - Rad onc to eval for for palliative radiation    3. Deconditioning  - See HPI for description of functional status  - PT/OT eval/treat. May require assistance at home  - Teds/SCDs and encourage ambulation    3. Hypertension  - Has  CHF and CAD and pacemaker since 2012  - Only home med in chart is lisinopril - will restart now    4. T2DM  - SSI low correction dose   - Diabetic diet    5. COPD  - Continue 2 L O2 by LILI Zuñiag PRN     Gynecologic Oncology  [facility]

## 2019-03-01 NOTE — ED NOTES
Spoke to Lynne at the transfer center. Pt has been accepted by Dr. Kat but awaiting bed assignment. Transfer center will call back with bed assignment.

## 2019-03-01 NOTE — ED NOTES
Pilar, charge nurse called [facility] and spoke to nurse on 8th floor. Informed that the room has to be cleaned and someone will call back when room is ready for patient.

## 2019-03-02 LAB
POCT GLUCOSE: 127 MG/DL (ref 70–110)
POCT GLUCOSE: 141 MG/DL (ref 70–110)
POCT GLUCOSE: 204 MG/DL (ref 70–110)
POCT GLUCOSE: 211 MG/DL (ref 70–110)

## 2019-03-02 PROCEDURE — 63600175 PHARM REV CODE 636 W HCPCS: Performed by: STUDENT IN AN ORGANIZED HEALTH CARE EDUCATION/TRAINING PROGRAM

## 2019-03-02 PROCEDURE — 99225 PR SUBSEQUENT OBSERVATION CARE,LEVEL II: CPT | Mod: GV,,, | Performed by: OBSTETRICS & GYNECOLOGY

## 2019-03-02 PROCEDURE — G0378 HOSPITAL OBSERVATION PER HR: HCPCS

## 2019-03-02 PROCEDURE — G6002 STEREOSCOPIC X-RAY GUIDANCE: HCPCS | Mod: 26,,, | Performed by: RADIOLOGY

## 2019-03-02 PROCEDURE — 77417 THER RADIOLOGY PORT IMAGE(S): CPT | Performed by: RADIOLOGY

## 2019-03-02 PROCEDURE — 99225 PR SUBSEQUENT OBSERVATION CARE,LEVEL II: ICD-10-PCS | Mod: GV,,, | Performed by: OBSTETRICS & GYNECOLOGY

## 2019-03-02 PROCEDURE — 25000003 PHARM REV CODE 250: Performed by: STUDENT IN AN ORGANIZED HEALTH CARE EDUCATION/TRAINING PROGRAM

## 2019-03-02 PROCEDURE — S5010 5% DEXTROSE AND 0.45% SALINE: HCPCS | Performed by: OBSTETRICS & GYNECOLOGY

## 2019-03-02 PROCEDURE — 25000003 PHARM REV CODE 250: Performed by: OBSTETRICS & GYNECOLOGY

## 2019-03-02 PROCEDURE — 77387 GUIDANCE FOR RADJ TX DLVR: CPT | Mod: TC | Performed by: RADIOLOGY

## 2019-03-02 PROCEDURE — G6002 PR STEREOSCOPIC XRAY GUIDE FOR RADIATION TX DELIV: ICD-10-PCS | Mod: 26,,, | Performed by: RADIOLOGY

## 2019-03-02 PROCEDURE — 77412 RADIATION TX DELIVERY LVL 3: CPT | Performed by: RADIOLOGY

## 2019-03-02 RX ORDER — IBUPROFEN 200 MG
16 TABLET ORAL
Status: DISCONTINUED | OUTPATIENT
Start: 2019-03-02 | End: 2019-03-04 | Stop reason: HOSPADM

## 2019-03-02 RX ORDER — INSULIN ASPART 100 [IU]/ML
0-5 INJECTION, SOLUTION INTRAVENOUS; SUBCUTANEOUS
Status: DISCONTINUED | OUTPATIENT
Start: 2019-03-02 | End: 2019-03-04 | Stop reason: HOSPADM

## 2019-03-02 RX ORDER — IBUPROFEN 200 MG
24 TABLET ORAL
Status: DISCONTINUED | OUTPATIENT
Start: 2019-03-02 | End: 2019-03-04 | Stop reason: HOSPADM

## 2019-03-02 RX ORDER — GLUCAGON 1 MG
1 KIT INJECTION
Status: DISCONTINUED | OUTPATIENT
Start: 2019-03-02 | End: 2019-03-04 | Stop reason: HOSPADM

## 2019-03-02 RX ORDER — HYDRALAZINE HYDROCHLORIDE 20 MG/ML
10 INJECTION INTRAMUSCULAR; INTRAVENOUS ONCE AS NEEDED
Status: DISCONTINUED | OUTPATIENT
Start: 2019-03-02 | End: 2019-03-04 | Stop reason: HOSPADM

## 2019-03-02 RX ADMIN — IBUPROFEN 400 MG: 400 TABLET, FILM COATED ORAL at 12:03

## 2019-03-02 RX ADMIN — LISINOPRIL 10 MG: 10 TABLET ORAL at 08:03

## 2019-03-02 RX ADMIN — HYDROXYZINE PAMOATE 25 MG: 25 CAPSULE ORAL at 09:03

## 2019-03-02 RX ADMIN — IBUPROFEN 400 MG: 400 TABLET, FILM COATED ORAL at 06:03

## 2019-03-02 RX ADMIN — DEXTROSE AND SODIUM CHLORIDE: 5; .45 INJECTION, SOLUTION INTRAVENOUS at 12:03

## 2019-03-02 RX ADMIN — IBUPROFEN 400 MG: 400 TABLET, FILM COATED ORAL at 11:03

## 2019-03-02 RX ADMIN — INSULIN ASPART 1 UNITS: 100 INJECTION, SOLUTION INTRAVENOUS; SUBCUTANEOUS at 11:03

## 2019-03-02 RX ADMIN — BENZONATATE 100 MG: 100 CAPSULE ORAL at 02:03

## 2019-03-02 RX ADMIN — HYDROCODONE BITARTRATE AND ACETAMINOPHEN 1 TABLET: 5; 325 TABLET ORAL at 09:03

## 2019-03-02 RX ADMIN — INSULIN ASPART 2 UNITS: 100 INJECTION, SOLUTION INTRAVENOUS; SUBCUTANEOUS at 06:03

## 2019-03-02 RX ADMIN — HYDROCODONE BITARTRATE AND ACETAMINOPHEN 1 TABLET: 5; 325 TABLET ORAL at 11:03

## 2019-03-02 RX ADMIN — IBUPROFEN 400 MG: 400 TABLET, FILM COATED ORAL at 05:03

## 2019-03-02 RX ADMIN — HYDROXYZINE PAMOATE 25 MG: 25 CAPSULE ORAL at 12:03

## 2019-03-02 NOTE — NURSING
Dr. Box notified that patient said the stockings are tight and refusing to wear them.  No new orders noted.

## 2019-03-02 NOTE — PLAN OF CARE
Problem: Adult Inpatient Plan of Care  Goal: Plan of Care Review  Outcome: Ongoing (interventions implemented as appropriate)  Plan of care reviewed with patient and family.  Patient went down to radiation today and got the markings .  Will get radiation on sat and Sunday.  Daugher at the bedside.  Getting iv fluids infusing without difficulty.  Eating a 2000 calorie diabetic diet without difficulty. Davis catheter discontinued this afternoon.  No complaints voiced at this time.

## 2019-03-02 NOTE — PROGRESS NOTES
Oncology Social Worker on call received a page from the Resident, Uma Box MD, this morning re: discharge planning and hospice services for patient. Called patient's room and spoke with patient's daughter Chuyita Christian. Patient lives with her daughter at [address] [place] [phone]. They do not have a home phone; daughter's cell phone number is [phone]. Daughter works during the day and patient was previously home and managed ADLs by herself. They do not have other family locally. Daughter moved to the [facility] from [place], [place], 19 years ago. Patient wasn't receiving any home health or other services prior to admission, and oxygen is the only DME that she has at home.  Explained hospice services in detail, including insurance coverage, choice of agencies, referral process. Daughter inquired if the hospice staff could go to the house each morning to give patient her medicine and fix breakfast as daughter usually leaves early for work. Explained that this is not something that hospice (or home health) staff can do daily. Suggested a nursing facility placement with hospice services. Daughter stated that she would rather quit working to take care of patient at home. Daughter has no hospice agency preference. She agreed with a referral being made to [facility] for an informational visit to start with. Sent referral to [facility] via [facility], and also assigned this agency to patient's chart. Called jose luis at [phone] and spoke with Pricila, the nurse on call; she will contact patient's daughter and schedule meeting at patient's hospital room for later today. Will continue to follow.

## 2019-03-02 NOTE — PROGRESS NOTES
[facility]  Gynecologic Oncology  H&P    Patient Name: Franklin Christian  MRN: [id number]  Admission Date: 3/1/2019  Primary Care Provider: Primary Doctor No   Principal Problem: Vaginal bleeding    Subjective:     Chief Complaint/Reason for Admission: Vaginal bleeding    Interval Changes: Seen by Radiation Oncology yesterday with plan for palliative radiation today and tomorrow with reassessment in 1 week for further treatment.    Subjective: Ms. Christian is doing well this morning. Reports bleeding has essentially stopped, decreased now to only spotting with no further passage of clots. Her primary complaint this morning is pruritis which she says is much improved with PRN Vistaril. She reports her lower abdominal/pelvic pain is intermittent and mild to moderate with much improvement last night with a dose of Norco. She is tolerating a diabetic diet with no N/V. Reports no BM since admission but states she was not eating x 2 days prior to admission and just started eating again yesterday. Reports ambulating normally (with baseline difficulty). Reports urinating without difficulty. Reports no change in baseline CP/SOB for which she is on O2. Reports productive cough at home, returned yesterday evening, much improved with Tessalon Pearls. She was seen by Radiation Oncology yesterday and understands the plan. She has yet to be seen by PT/OT.   Daughter states that she takes 2u insulin with meals at home.      Past Medical History:   Diagnosis Date    Cervical cancer     Diabetes mellitus     Hypertension      Past Surgical History:   Procedure Laterality Date    CARDIAC PACEMAKER PLACEMENT       Family History     None        Tobacco Use    Smoking status: Never Smoker    Smokeless tobacco: Never Used   Substance and Sexual Activity    Alcohol use: Not on file    Drug use: Not on file    Sexual activity: Not on file       PTA Medications   Medication Sig    lisinopril 10 MG tablet Take 1  tablet (10 mg total) by mouth once daily.       Review of patient's allergies indicates:   Allergen Reactions    Morphine        Objective:     Vital Signs (Most Recent):  Temp: 97.8 °F (36.6 °C) (03/02/19 0512)  Pulse: 72 (03/02/19 0512)  Resp: 20 (03/02/19 0512)  BP: (!) 164/75 (03/02/19 0512)  SpO2: 97 % (03/02/19 0512) Vital Signs (24h Range):  Temp:  [97.6 °F (36.4 °C)-98.5 °F (36.9 °C)] 97.8 °F (36.6 °C)  Pulse:  [71-81] 72  Resp:  [18-20] 20  SpO2:  [94 %-99 %] 97 %  BP: (145-164)/(65-75) 164/75     Weight: 115.7 kg (255 lb 1.2 oz)  Body mass index is 43.78 kg/m².    Physical Exam:   Constitutional: She is oriented to person, place, and time. She appears well-developed and well-nourished. No distress.    HENT:   Head: Normocephalic and atraumatic.    Eyes: Conjunctivae and EOM are normal. No scleral icterus.    Neck: Normal range of motion.    Cardiovascular: Normal rate and regular rhythm.     Pulmonary/Chest: Effort normal. No respiratory distress. She has no wheezes.   Crackles throughout, 2L NC in place        Abdominal: Soft. Bowel sounds are normal. She exhibits no distension and no mass (no palpable mass, although body habitus limits exam). There is tenderness (tender to palpation over lower abdomen and under lower panus over the pelvis). There is no guarding.     Genitourinary:   Genitourinary Comments: No visible bleeding beneath patient           Musculoskeletal: Moves all extremeties.       Neurological: She is alert and oriented to person, place, and time.    Skin: She is not diaphoretic.    Psychiatric: She has a normal mood and affect.     Laboratory:  CBC:   Recent Labs   Lab 02/28/19  1558 02/28/19  1938 03/01/19  0547   WBC 6.59 6.13 6.05   HGB 13.3 12.0 10.9*   HCT 42.3 38.8 35.6*    222 208    and All pertinent labs from the last 24 hours have been reviewed.    Diagnostic Results:  Labs: Reviewed  CT PELVIS WITH AND WITHOUT CONTRAST    CLINICAL HISTORY:  Neoplasm: uterine,  suspected;    TECHNIQUE:  CT of the pelvis was performed before and after administration of 100 cc Omnipaque 350 IV contrast.    COMPARISON:  None    FINDINGS:  Uterus is enlarged and heterogenous in appearance with lobular contour in this postmenopausal patient.  There is 5.5 cm intramural lesion, suspected fibroid seen.  Right adnexal/ovarian 4.2 cm cystic lesion is visualized which measures higher than simple fluid density.  Visualized loops of bowel show no evidence of obstruction or inflammation.  Urinary bladder is unremarkable.  No acute osseous abnormality identified.      Impression       1. Enlarged lobular uterus with 5.5 cm intramural lesion suggestive for fibroid, however other potential neoplastic process not excluded in this patient with reported clinical history of uterine neoplasm.  2. Right adnexal/ovarian 4.2 cm cystic lesion.  Differential considerations include ovarian cyst/complex cyst, paraovarian cyst, or cystic neoplasm.      Electronically signed by: Rtea Vargas MD  Date: 02/28/2019  Time: 17:29       Assessment/Plan:     Active Diagnoses:    Diagnosis Date Noted POA    PRINCIPAL PROBLEM:  Vaginal bleeding [N93.9] 02/28/2019 Yes      Problems Resolved During this Admission:     1. Endometrial cancer  - Per patient and her daughter, cervical cancer was diagnosed by pap and biopsy  - Records obtained from [facility] today; now scanned into media and copy in paper chart. On 6/21/18 pap showed endometrial adenocarcinoma, HPV negative. Endometrial biopsy shows at least endometrial hyperplasia with atypia, cannot rule out adenocarcinoma.   - No treatment over the last year 2/2 multiple relocations and denial per patient and her daughter  - CT shows enlarged uterus with 5.5 cm intramural structure, as well as right adnexal 4.2 cm cystic lesion   - Patient not a candidate for surgery/chemo 2/2 poor performance status with ECOG PS = 3 and medical comorbidites. She spends most of her day in bed or  sitting in a chair, only walking to and from the bathroom and kitchen (per daughter, walking even less than here). Dr. Kat discussed with patient and her daughter at length regarding this  Palliation and expected prognosis.  - Rad onc consulted: plan for palliative radiation today and tomorrow with reassessment in 1 week for further treatment   - Scheduled motrin with [place] PRN BTP. Patient and daughter report h/o delirium only with morphine, report taking Norco PRN at home without issue. A + O x 3.  - DNR/DNI    2. Vaginal Bleeding  - Transferred from OSH 2/2 acute heavy vaginal bleeding  - H/h 12/38.8-->10.9/35.6  - Likely uterine, 2/2 endometrial adenocarcinoma  - Had vag pack in at OSH but none currently  - s/p moore catheter  - Bleeding essentially stopped with only minimal spotting, no further passage of clots  - D/c IVF   - Rad onc plan for palliative radiation today and tomorrow with reassessment in 1 week for further treatment  - S/p PICC placement    3. Deconditioning  - See HPI for description of functional status  - PT/OT eval/treat. May require assistance at home.  - SCDs and encourage ambulation. TEDs do not fit patient and are very uncomfortable for patient. Discussed okay to remove TEDs as long as SCDs stay in place and functioning.    3. Hypertension  - BP: (145-164)/(65-75) 164/75  - Has CHF and CAD and pacemaker since 2012  - Continue home lisinopril 10mg  - Hydralazine PRN SBP> 180    4. T2DM  - Glucose 119  - POCT glucose checks  - SSI low correction dose   - Diabetic diet    5. COPD  - Continue 2 L O2 by NC  - DuoNebs PRN   - Tessalon Pearls PRN cough    Uma Box MD  PGY-4 OB/GYN

## 2019-03-02 NOTE — PLAN OF CARE
Problem: Adult Inpatient Plan of Care  Goal: Plan of Care Review  Outcome: Ongoing (interventions implemented as appropriate)  POC reviewed with patient and daughter; understanding verbalized. D5 w 1/2 NS @ 125. Pt remains up with 1-person assistance. Pt voids yellow urine in hat, via toilet. One small formed BM this shift. Diabetic diet. Good appetite. Pt c/o back and abdominal pain. PRN Vicodin administered, with mild to moderate results obtained. Pt c/o intermittent cough and itchiness. PRN tessalon perles and Vistaril administered, with moderate results achieved. Pt. with nonskid footwear on, bed in lowest position, and locked with bed rails up x2.  Pt. instructed to call prior to getting OOB.  Pt. has call light and personal items within reach. VSS and afebrile this shift. Daughter to remain @ bedside. All questions and concerns addressed at this time. Will continue to monitor.

## 2019-03-02 NOTE — NURSING
Patient complain that the stockings hurt they are to tight on her legs.  Scds on bilaterally.  CAll placed to marisabel awaiting a return call.

## 2019-03-02 NOTE — PLAN OF CARE
Problem: Adult Inpatient Plan of Care  Goal: Plan of Care Review  Outcome: Ongoing (interventions implemented as appropriate)  Patient remains free from falls and injury this shift. Bed in low, locked position with call light in reach. Daughter at bedside. PRN medication given for itching, pain, and cough. Radiation completed this shift. Patient denies n/v/d, VS stable. Accuchecks initiated ACHS; no SSI needed. Patient encouraged to call for assistance when needed.  Patient verbalized understanding. All belongings within reach.  Will continue to monitor.

## 2019-03-03 LAB
POCT GLUCOSE: 117 MG/DL (ref 70–110)
POCT GLUCOSE: 157 MG/DL (ref 70–110)
POCT GLUCOSE: 161 MG/DL (ref 70–110)
POCT GLUCOSE: 194 MG/DL (ref 70–110)

## 2019-03-03 PROCEDURE — 99225 PR SUBSEQUENT OBSERVATION CARE,LEVEL II: ICD-10-PCS | Mod: GV,,, | Performed by: OBSTETRICS & GYNECOLOGY

## 2019-03-03 PROCEDURE — 99225 PR SUBSEQUENT OBSERVATION CARE,LEVEL II: CPT | Mod: GV,,, | Performed by: OBSTETRICS & GYNECOLOGY

## 2019-03-03 PROCEDURE — G0378 HOSPITAL OBSERVATION PER HR: HCPCS

## 2019-03-03 PROCEDURE — 25000003 PHARM REV CODE 250: Performed by: STUDENT IN AN ORGANIZED HEALTH CARE EDUCATION/TRAINING PROGRAM

## 2019-03-03 PROCEDURE — G8987 SELF CARE CURRENT STATUS: HCPCS | Mod: CJ

## 2019-03-03 PROCEDURE — 63600175 PHARM REV CODE 636 W HCPCS: Performed by: OBSTETRICS & GYNECOLOGY

## 2019-03-03 PROCEDURE — G8988 SELF CARE GOAL STATUS: HCPCS | Mod: CI

## 2019-03-03 PROCEDURE — 97165 OT EVAL LOW COMPLEX 30 MIN: CPT

## 2019-03-03 RX ORDER — ONDANSETRON 2 MG/ML
4 INJECTION INTRAMUSCULAR; INTRAVENOUS EVERY 6 HOURS PRN
Status: DISCONTINUED | OUTPATIENT
Start: 2019-03-03 | End: 2019-03-04 | Stop reason: HOSPADM

## 2019-03-03 RX ADMIN — HYDROXYZINE PAMOATE 25 MG: 25 CAPSULE ORAL at 05:03

## 2019-03-03 RX ADMIN — IBUPROFEN 400 MG: 400 TABLET, FILM COATED ORAL at 06:03

## 2019-03-03 RX ADMIN — IBUPROFEN 400 MG: 400 TABLET, FILM COATED ORAL at 12:03

## 2019-03-03 RX ADMIN — LISINOPRIL 10 MG: 10 TABLET ORAL at 08:03

## 2019-03-03 RX ADMIN — HYDROCODONE BITARTRATE AND ACETAMINOPHEN 1 TABLET: 5; 325 TABLET ORAL at 05:03

## 2019-03-03 RX ADMIN — BENZONATATE 100 MG: 100 CAPSULE ORAL at 10:03

## 2019-03-03 RX ADMIN — HYDROCODONE BITARTRATE AND ACETAMINOPHEN 1 TABLET: 5; 325 TABLET ORAL at 07:03

## 2019-03-03 RX ADMIN — ONDANSETRON 4 MG: 2 INJECTION INTRAMUSCULAR; INTRAVENOUS at 01:03

## 2019-03-03 RX ADMIN — IBUPROFEN 400 MG: 400 TABLET, FILM COATED ORAL at 05:03

## 2019-03-03 NOTE — PLAN OF CARE
Problem: Adult Inpatient Plan of Care  Goal: Plan of Care Review  Outcome: Ongoing (interventions implemented as appropriate)  Patient remains free from falls and injury this shift. Bed in low, locked position with call light in reach. Daughter at bedside. Pt denies pain. 1 episode of emesis this shift; PRN zofran given with full relief. Patient up in the chair this AM. VS stable. Patient encouraged to call for assistance when needed.  Patient verbalized understanding. All belongings within reach.  Will continue to monitor.

## 2019-03-03 NOTE — PROGRESS NOTES
[facility]  Gynecologic Oncology  H&P    Patient Name: Franklin Christian  MRN: [id number]  Admission Date: 3/1/2019  Primary Care Provider: Primary Doctor No   Principal Problem: Vaginal bleeding    Subjective:     Chief Complaint/Reason for Admission: Vaginal bleeding    Interval Changes: Seen by Radiation Oncology yesterday with plan for palliative radiation today and tomorrow with reassessment in 1 week for further treatment.    Subjective: Ms. Christian is doing well this morning. Reports passed one clot yesterday but no further issues with bleeding. She reports her lower abdominal/pelvic pain is intermittent and mild to moderate with much improvement last night with a dose of Norco. She is tolerating a diabetic diet with no N/V. Reports ambulating normally (with baseline difficulty). Reports urinating frequently. Reports no change in baseline CP/SO. Reports improvement in cough with tessalon pearls. Tolerated radiation without difficulty.  Daughter states that she takes 2u insulin with meals at home.      Past Medical History:   Diagnosis Date    Cervical cancer     Diabetes mellitus     Hypertension      Past Surgical History:   Procedure Laterality Date    CARDIAC PACEMAKER PLACEMENT       Family History     None        Tobacco Use    Smoking status: Never Smoker    Smokeless tobacco: Never Used   Substance and Sexual Activity    Alcohol use: Not on file    Drug use: Not on file    Sexual activity: Not on file       PTA Medications   Medication Sig    lisinopril 10 MG tablet Take 1 tablet (10 mg total) by mouth once daily.       Review of patient's allergies indicates:   Allergen Reactions    Morphine        Objective:     Vital Signs (Most Recent):  Temp: 97.9 °F (36.6 °C) (03/03/19 0542)  Pulse: 76 (03/03/19 0542)  Resp: 20 (03/03/19 0542)  BP: (!) 170/75 (03/03/19 0542)  SpO2: 96 % (03/03/19 0542) Vital Signs (24h Range):  Temp:  [97.4 °F (36.3 °C)-98.2 °F (36.8 °C)] 97.9 °F (36.6  °C)  Pulse:  [73-81] 76  Resp:  [16-20] 20  SpO2:  [96 %-100 %] 96 %  BP: (140-170)/(62-75) 170/75     Weight: 115.7 kg (255 lb 1.2 oz)  Body mass index is 43.78 kg/m².    Physical Exam:   Constitutional: She is oriented to person, place, and time. She appears well-developed and well-nourished. No distress.    HENT:   Head: Normocephalic and atraumatic.    Eyes: Conjunctivae and EOM are normal. No scleral icterus.    Neck: Normal range of motion.    Cardiovascular: Normal rate and regular rhythm.     Pulmonary/Chest: Effort normal. No respiratory distress. She has no wheezes.   Crackles throughout, 2L NC in place        Abdominal: Soft. Bowel sounds are normal. She exhibits no distension and no mass (no palpable mass, although body habitus limits exam). There is tenderness (tender to palpation over lower abdomen and under lower panus over the pelvis). There is no guarding.     Genitourinary:   Genitourinary Comments: No visible bleeding beneath patient           Musculoskeletal: Moves all extremeties.       Neurological: She is alert and oriented to person, place, and time.    Skin: She is not diaphoretic.    Psychiatric: She has a normal mood and affect.     Laboratory:  CBC:   No results for input(s): WBC, HGB, HCT, PLT in the last 48 hours. and All pertinent labs from the last 24 hours have been reviewed.    Diagnostic Results:  Labs: Reviewed  CT PELVIS WITH AND WITHOUT CONTRAST    CLINICAL HISTORY:  Neoplasm: uterine, suspected;    TECHNIQUE:  CT of the pelvis was performed before and after administration of 100 cc Omnipaque 350 IV contrast.    COMPARISON:  None    FINDINGS:  Uterus is enlarged and heterogenous in appearance with lobular contour in this postmenopausal patient.  There is 5.5 cm intramural lesion, suspected fibroid seen.  Right adnexal/ovarian 4.2 cm cystic lesion is visualized which measures higher than simple fluid density.  Visualized loops of bowel show no evidence of obstruction or  inflammation.  Urinary bladder is unremarkable.  No acute osseous abnormality identified.      Impression       1. Enlarged lobular uterus with 5.5 cm intramural lesion suggestive for fibroid, however other potential neoplastic process not excluded in this patient with reported clinical history of uterine neoplasm.  2. Right adnexal/ovarian 4.2 cm cystic lesion.  Differential considerations include ovarian cyst/complex cyst, paraovarian cyst, or cystic neoplasm.      Electronically signed by: Reta Vargas MD  Date: 02/28/2019  Time: 17:29       Assessment/Plan:     Active Diagnoses:    Diagnosis Date Noted POA    PRINCIPAL PROBLEM:  Vaginal bleeding [N93.9] 02/28/2019 Yes      Problems Resolved During this Admission:     1. Endometrial cancer  - Patient not a candidate for surgery/chemo 2/2 poor performance status with ECOG PS = 3 and medical comorbidites. She spends most of her day in bed or sitting in a chair, only walking to and from the bathroom and kitchen (per daughter, walking even less than here). Dr. Kat discussed with patient and her daughter at length regarding this  Palliation and expected prognosis.  - Rad onc consulted: plan for palliative radiation today and tomorrow with reassessment in 1 week for further treatment   - Scheduled motrin with [place] PRN BTP. Patient and daughter report h/o delirium only with morphine, report taking Norco PRN at home without issue. A + O x 3.  - DNR/DNI    2. Vaginal Bleeding  - Bleeding essentially stopped with only minimal spotting, no further passage of clots  - Rad onc plan for palliative radiation today with reassessment in 1 week for further treatment  - S/p PICC placement     3. Deconditioning  - See HPI for description of functional status  - PT/OT eval/treat. May require assistance at home.  - SCDs and encourage ambulation. TEDs do not fit patient and are very uncomfortable for patient. Discussed okay to remove TEDs as long as SCDs stay in place and  functioning.    3. Hypertension  - BP: (140-170)/(62-75) 170/75  - Has CHF and CAD and pacemaker since 2012  - Continue home lisinopril 10mg  - Hydralazine PRN SBP> 180    4. T2DM  - Glucose 119  - POCT glucose checks  - SSI low correction dose   - Diabetic diet    5. COPD  - Continue 2 L O2 by NC  - DuoNebs PRN   - Tessalon Pearls PRN cough    Michael Moon MD   PGY-4 Ob-gyn

## 2019-03-03 NOTE — PLAN OF CARE
Problem: Adult Inpatient Plan of Care  Goal: Plan of Care Review  Outcome: Ongoing (interventions implemented as appropriate)  POC reviewed with patient and daughter; understanding verbalized. Pt remains up with 1-person assistance. Pt voids yellow urine into bathroom toilet. No BM's this shift. Diabetic diet. Good appetite. Accuchecks ACHS, with SS coverage administered this shift, per MD orders. Pt c/o back and abdominal pain. PRN Vicodin administered, with mild to moderate results obtained. SCD's in place. Pt. with nonskid footwear on, bed in lowest position, and locked with bed rails up x 2.  Pt. instructed to call prior to getting OOB. Pt. has call light and personal items within reach. VSS and afebrile this shift. Daughter to remain @ bedside. All questions and concerns addressed at this time. Will continue to monitor.

## 2019-03-03 NOTE — PLAN OF CARE
Problem: Occupational Therapy Goal  Goal: Occupational Therapy Goal  Goals to be met by: 3/10/19    Patient will increase functional independence with ADLs by performing:    UE Dressing with Modified [place].  LE Dressing with Set-up Assistance.  Grooming while standing at sink with Stand-by Assistance.  Toileting from toilet with Stand-by Assistance for hygiene and clothing management.   Toilet transfer to toilet with Stand-by Assistance.  Upper extremity exercise program x15 reps per handout, with independence.    Outcome: Ongoing (interventions implemented as appropriate)  OT POC implemented on this date. OTevaluation complete. Pt presented to OT with generalized weakness affecting her pace and quality of movements during functional activities. Pt completed bed mobility with SBA, functional mobility with CGA and BUE HHA, and sit<>stand with CGA for safety. Pt responded well to the evaluation on this date with no c/o increased pain with activity. Pt's daughter present and translated for patient. OT is recommending HHOT to assess safety during ADL/IADL completion within the home. Due to good social support and pt's current functional status, pt would require skilled OT 2x/ wk to prevent further debility and continue to build strength to increase independence in self care tasks and functional transfers.     Comments: Sally Villarreal OTR/L

## 2019-03-03 NOTE — PT/OT/SLP EVAL
Occupational Therapy   Evaluation    Name: Franklin Christian  MRN: [id number]  Admitting Diagnosis:  Vaginal bleeding      Recommendations:     Discharge Recommendations: (HHOT )  Discharge Equipment Recommendations:  wheelchair, manual  Barriers to discharge:  None    Assessment:     Franklin Christian is a 73 y.o. female with a medical diagnosis of Vaginal bleeding.  OT POC implemented on this date. OTevaluation complete. Pt presented to OT with generalized weakness affecting her pace and quality of movements during functional activities. Pt completed bed mobility with SBA, functional mobility with CGA and BUE HHA, and sit<>stand with CGA for safety. Pt responded well to the evaluation on this date with no c/o increased pain with activity. Pt's daughter present and translated for patient. OT is recommending HHOT to assess safety during ADL/IADL completion within the home. Due to good social support and pt's current functional status, pt would require skilled OT 2x/ wk to prevent further debility and continue to build strength to increase independence in self care tasks and functional transfers.      Performance deficits affecting function: weakness, impaired endurance, impaired functional mobilty, impaired self care skills.      Rehab Prognosis: Good; patient would benefit from acute skilled OT services to address these deficits and reach maximum level of function.       Plan:     Patient to be seen 2 x/week to address the above listed problems via self-care/home management, therapeutic activities, therapeutic exercises  · Plan of Care Expires: 04/01/19  · Plan of Care Reviewed with: patient, daughter    Subjective     Chief Complaint: Nausea at the end of session  Patient/Family Comments/goals: Pt and daughter agreeable to OT POC.    Occupational Profile:  Living Environment: Pt lives with daughter in a [facility] with 3 ISAIAH and bilateral handrail. Bathroom set up: Tub shower combo with shower chair   Previous level of  function: PTA, pt required assistance for LE dressing to don socks and bathing for thoroughness for myesha-care. Pt's daughter completed cooking, medication management, and laundry for patient. Pt ambulated with no AD within the home and community. Pt's daughter reported of just purchasing a RW for patient. Pt is not driving at the time.   Roles and Routines: Homemaker, loves going to [employer]  Equipment Used at Home:  walker, rolling, shower chair, oxygen  Assistance upon Discharge: Pt will have assistance from daughter upon discharge. Daughter works during the day.    Pain/Comfort:  · Pain Rating 1: 9/10  · Pain Rating Post-Intervention 1: 0/10    Patients cultural, spiritual, Pent[employer]al conflicts given the current situation: no    Objective:     Communicated with: RN prior to session.  Patient found supine with (no lines attached) upon OT entry to room.    General Precautions: Standard, fall   Orthopedic Precautions:N/A   Braces: N/A     Occupational Performance:    Bed Mobility:    · Patient completed Rolling/Turning to Left with  stand by assistance  · Patient completed Rolling/Turning to Right with stand by assistance  · Patient completed Scooting/Bridging with stand by assistance  · Patient completed Supine to Sit with stand by assistance    Functional Mobility/Transfers:  · Patient completed Sit <> Stand Transfer from bed with contact guard assistance  with  no assistive device   · Patient completed Bed <> Chair Transfer using Step Transfer technique with contact guard assistance with hand-held assist  · Functional Mobility: Pt ambulated `80 ft with CGA and HHA to improve endurance for self care tasks and functional transfers. No LOB noted. No RBs required. Minimal to no fatigue noted.     Activities of Daily Living:  · Upper Body Dressing: Set up assistance to don gown as jacket in sitting     Cognitive/Visual Perceptual:  Cognitive/Psychosocial Skills:     -       Oriented to: Person, Place, Time and Situation    -       Follows Commands/attention:Follows multistep  commands  -       Communication: clear/fluent and [place] speaking  -       Memory: No Deficits noted  -       Safety awareness/insight to disability: intact   -       Mood/Affect/Coping skills/emotional control: Appropriate to situation  Visual/Perceptual:      -Intact no deficits noted    Physical Exam:  Balance:    -       Sitting- Mod I   Standing: CGA  Postural examination/scapula alignment:    -       No postural abnormalities identified  Skin integrity: Visible skin intact  Edema:  Mild BLE  Sensation:    -       Intact  Upper Extremity Range of Motion:     -       Right Upper Extremity: WFL  -       Left Upper Extremity: WFL  Upper Extremity Strength:    -       Right Upper Extremity: WFL  -       Left Upper Extremity: WFL   Strength:    -       Right Upper Extremity: WFL  -       Left Upper Extremity: WFL  Fine Motor Coordination:    -       Intact    AMPAC 6 Click ADL:  AMPAC Total Score: 20    Treatment & Education:  - Role of OT/ OT POC  - Self care safety/ independence  - Functional transfer/ mobility safety  - Bed mobility safety  - Importance of sitting UIC to improve endurance  - Energy conservation techniques such as taking rest breaks as needed  - Importance of UE and LE movement with demonstration provided to prevent stiffness/ tone     Education:    Patient left up in chair with all lines intact, call button in reach and RN notified    GOALS:   Multidisciplinary Problems     Occupational Therapy Goals        Problem: Occupational Therapy Goal    Goal Priority Disciplines Outcome Interventions   Occupational Therapy Goal     OT, PT/OT Ongoing (interventions implemented as appropriate)    Description:  Goals to be met by: 3/10/19    Patient will increase functional independence with ADLs by performing:    UE Dressing with Modified [place].  LE Dressing with Set-up Assistance.  Grooming while standing at sink with Stand-by  Assistance.  Toileting from toilet with Stand-by Assistance for hygiene and clothing management.   Toilet transfer to toilet with Stand-by Assistance.  Upper extremity exercise program x15 reps per handout, with independence.                      History:     Past Medical History:   Diagnosis Date    Cervical cancer     Diabetes mellitus     Hypertension        Past Surgical History:   Procedure Laterality Date    CARDIAC PACEMAKER PLACEMENT         Time Tracking:     OT Date of Treatment: 03/03/19  OT Start Time: 0822  OT Stop Time: 0844  OT Total Time (min): 22 min    Billable Minutes:Evaluation 22    Sally Villarreal OT  3/3/2019

## 2019-03-04 VITALS
WEIGHT: 255.06 LBS | RESPIRATION RATE: 18 BRPM | HEIGHT: 64 IN | DIASTOLIC BLOOD PRESSURE: 72 MMHG | BODY MASS INDEX: 43.54 KG/M2 | OXYGEN SATURATION: 99 % | TEMPERATURE: 98 F | SYSTOLIC BLOOD PRESSURE: 169 MMHG | HEART RATE: 72 BPM

## 2019-03-04 PROBLEM — G89.3 CANCER ASSOCIATED PAIN: Status: ACTIVE | Noted: 2019-03-04

## 2019-03-04 PROBLEM — C54.1 ENDOMETRIAL CANCER: Status: ACTIVE | Noted: 2019-03-04

## 2019-03-04 LAB
POCT GLUCOSE: 119 MG/DL (ref 70–110)
POCT GLUCOSE: 168 MG/DL (ref 70–110)
POCT GLUCOSE: 207 MG/DL (ref 70–110)

## 2019-03-04 PROCEDURE — 97161 PT EVAL LOW COMPLEX 20 MIN: CPT

## 2019-03-04 PROCEDURE — 99225 PR SUBSEQUENT OBSERVATION CARE,LEVEL II: ICD-10-PCS | Mod: GV,,, | Performed by: OBSTETRICS & GYNECOLOGY

## 2019-03-04 PROCEDURE — 99225 PR SUBSEQUENT OBSERVATION CARE,LEVEL II: CPT | Mod: GV,,, | Performed by: OBSTETRICS & GYNECOLOGY

## 2019-03-04 PROCEDURE — 25000003 PHARM REV CODE 250: Performed by: OBSTETRICS & GYNECOLOGY

## 2019-03-04 PROCEDURE — 25000003 PHARM REV CODE 250: Performed by: STUDENT IN AN ORGANIZED HEALTH CARE EDUCATION/TRAINING PROGRAM

## 2019-03-04 PROCEDURE — G0378 HOSPITAL OBSERVATION PER HR: HCPCS

## 2019-03-04 RX ORDER — HYDROCODONE BITARTRATE AND ACETAMINOPHEN 10; 325 MG/1; MG/1
1 TABLET ORAL EVERY 4 HOURS PRN
Status: DISCONTINUED | OUTPATIENT
Start: 2019-03-04 | End: 2019-03-04 | Stop reason: HOSPADM

## 2019-03-04 RX ORDER — LISINOPRIL 10 MG/1
10 TABLET ORAL DAILY
Qty: 30 TABLET | Refills: 6 | OUTPATIENT
Start: 2019-03-04 | End: 2020-03-03

## 2019-03-04 RX ORDER — BENZONATATE 100 MG/1
100 CAPSULE ORAL 3 TIMES DAILY PRN
Qty: 12 CAPSULE | Refills: 0 | Status: SHIPPED | OUTPATIENT
Start: 2019-03-04 | End: 2019-03-14

## 2019-03-04 RX ORDER — HYDROCODONE BITARTRATE AND ACETAMINOPHEN 10; 325 MG/1; MG/1
1 TABLET ORAL
Qty: 90 TABLET | Refills: 0 | Status: SHIPPED | OUTPATIENT
Start: 2019-03-04

## 2019-03-04 RX ADMIN — IBUPROFEN 400 MG: 400 TABLET, FILM COATED ORAL at 06:03

## 2019-03-04 RX ADMIN — LISINOPRIL 10 MG: 10 TABLET ORAL at 08:03

## 2019-03-04 RX ADMIN — IBUPROFEN 400 MG: 400 TABLET, FILM COATED ORAL at 05:03

## 2019-03-04 RX ADMIN — IBUPROFEN 400 MG: 400 TABLET, FILM COATED ORAL at 11:03

## 2019-03-04 RX ADMIN — IBUPROFEN 400 MG: 400 TABLET, FILM COATED ORAL at 12:03

## 2019-03-04 RX ADMIN — HYDROCODONE BITARTRATE AND ACETAMINOPHEN 1 TABLET: 5; 325 TABLET ORAL at 02:03

## 2019-03-04 RX ADMIN — HYDROCODONE BITARTRATE AND ACETAMINOPHEN 1 TABLET: 5; 325 TABLET ORAL at 08:03

## 2019-03-04 RX ADMIN — HYDROXYZINE PAMOATE 25 MG: 25 CAPSULE ORAL at 02:03

## 2019-03-04 RX ADMIN — HYDROCODONE BITARTRATE AND ACETAMINOPHEN 1 TABLET: 10; 325 TABLET ORAL at 03:03

## 2019-03-04 RX ADMIN — HYDROXYZINE PAMOATE 25 MG: 25 CAPSULE ORAL at 04:03

## 2019-03-04 NOTE — PLAN OF CARE
Problem: Adult Inpatient Plan of Care  Goal: Plan of Care Review  Outcome: Ongoing (interventions implemented as appropriate)  POC reviewed with patient and daughter; understanding verbalized. Notified MD of SBP in the range of 160-178 this shift. No new orders at this time. Pt remains up with 1-person assistance. Pt voids yellow urine into bathroom toilet. No BM's this shift. Diabetic diet. Good appetite. Accuchecks ACHS, with no coverage needed this shift. Pt c/o back and abdominal pain. PRN Vicodin administered and heat packs applied to abdominal area, with mild results obtained. Pt c/o intermittent cough and itchiness. PRN tessalon perles and Vistaril administered, with moderate results achieved. SCD's in place. Pt. with nonskid footwear on, bed in lowest position, and locked with bed rails up x 2.  Pt. instructed to call prior to getting OOB. Pt. has call light and personal items within reach. VSS and afebrile this shift. Daughter to remain @ bedside. All questions and concerns addressed at this time. Will continue to monitor.

## 2019-03-04 NOTE — PLAN OF CARE
Plans for patient to discharge home today with family support and home hospice. SW assisting patient and patient's family with home hospice arrangements through [facility].    No follow-up appt needed secondary to discharge disposition.     03/04/19 1325   Final Note   Assessment Type Final Discharge Note   Anticipated Discharge Disposition [facility]  ([facility] (home hospice))   What phone number can be called within the next 1-3 days to see how you are doing after discharge? ([phone])   Hospital Follow Up  Appt(s) scheduled? (N/A)   Discharge plans and expectations educations in teach back method with documentation complete? (N/A)

## 2019-03-04 NOTE — PLAN OF CARE
Problem: Adult Inpatient Plan of Care  Goal: Plan of Care Review  Outcome: Ongoing (interventions implemented as appropriate)  Plan of care reviewed with patient and family.  Fall precautions maintained, side rails up x2, call light  In reach, bed in low position and locked, nonskid socks on.  Getting accuchecks ac and hs.  Requesting pain meds during the shift and getting relief.  Ambulating, voiding and tolerting a 2000 calorie diabetic diet without t difficulty.

## 2019-03-04 NOTE — PROGRESS NOTES
All arrangements have been made for pt. To dc home with hospice. Representative from [facility] (Giulia) has met with pt. And her daughter and have signed consents. Equipment has been delivered to pts. Home and portable )2 to the pts. Room. Pt. OK for dc. No other needs identified. Will follow.

## 2019-03-04 NOTE — PLAN OF CARE
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       Hospice Qualifying Diagnoses:        Patient has a life expectancy < 6 months due to:  1) Primary Hospice Diagnosis:  Endometrial cancer, not treatable due to functional status   2) Comorbid Conditions Contributing to Decline:  CHF/CAD, DM II, COPD    Vital Signs: Routine per Hospice Protocol.    Code Status: DNR/DNI    Allergies:   Review of patient's allergies indicates:   Allergen Reactions    Morphine        Diet: Diabetic    Activities: As tolerated    - Nursing: Per Hospice Routine.      Oxygen: Nasal cannula as needed    Other Miscellaneous Care: *  Medications:      Franklin Christian   Home Medication Instructions HUMA:[id number]    Printed on:03/04/19 9328   Medication Information                      lisinopril 10 MG tablet  Take 1 tablet (10 mg total) by mouth once daily.             Hydrocodone-acetaminophen  take one tablet every 4-6 hours as needed for pain  Resume home cardiac medications not listed in chart  Insulin per orders below    DIABETES CARE:   Nurse to perform and educate diabetic management with blood glucose monitoring:      Fingerstick blood sugar a.m. and p.m.      Fingerstick blood sugar every 6 hours if patient is unable to eat    Report CBG < 60 or > 350 to physician.         Insulin Sliding Scale         Glucose  Novolog Insulin Subcutaneous        0 - 60   Orange juice or glucose tablet      No insulin   201-250  2 units   251-300  4 units   301-350  6 units   351-400  8 units   >400   10 units  then call physician      **Patient usually takes 2 units of insulin with meals at home**      Future Orders:  Hospice Medical Director may dictate new orders for comfortable care measures & sign death certificate.        _________________________________  B Tyrel Moon MD  03/04/2019

## 2019-03-04 NOTE — PT/OT/SLP EVAL
Physical Therapy Evaluation    Patient Name:  Franklin Christian   MRN:  [id number]    Recommendations:     Discharge Recommendations:  ( PT)   Discharge Equipment Recommendations: wheelchair, manual   Barriers to discharge: None    Assessment:     Franklin Christian is a 73 y.o. female admitted with a medical diagnosis of Vaginal bleeding.  She presents with the following impairments/functional limitations:  weakness, impaired endurance, gait instability, impaired functional mobilty .  At today's session, patient  Able to gait x 120 feet with SBA using RW. limited by increased fatigue and SOB. Pt also able to participate in LE TE while sitting in chair.     Benefits of skilled PT services include decreasing risk of falls, preventing skin break down and limiting further deconditioning during their hospital stay.    Discharge recommendation home with home health PT in order to maximize mobility, decrease caregiver burden and increase  functional independence while in the home setting.          Rehab Prognosis: Good; patient would benefit from acute skilled PT services to address these deficits and reach maximum level of function.    Recent Surgery: * No surgery found *      Plan:     During this hospitalization, patient to be seen 2 x/week to address the identified rehab impairments via gait training, therapeutic activities, therapeutic exercises, neuromuscular re-education and progress toward the following goals:    · Plan of Care Expires:  04/04/19    Subjective     Chief Complaint: pt states she is too tired  Patient/Family Comments/goals: to return home  Pain/Comfort:  · Pain Rating 1: 0/10  · Pain Rating Post-Intervention 1: 0/10    Patients cultural, spiritual, Sikhism conflicts given the current situation: no    Living Environment:  Living Environment: Pt lives with daughter in a [facility] with 3 ISAIAH and bilateral handrail. Bathroom set up: Tub shower combo with shower chair   Previous level of function: PTA, pt  required assistance for LE dressing to don socks and bathing for thoroughness for meysha-care. Pt's daughter completed cooking, medication management, and laundry for patient. Pt ambulated with no AD within the home and community. Pt's daughter reported of just purchasing a RW for patient. Pt is not driving at the time.   Roles and Routines: Homemaker, loves going to [employer]  Equipment Used at Home:  walker, rolling, shower chair, oxygen  Assistance upon Discharge: Pt will have assistance from daughter upon discharge. Daughter works during the day.          Objective:     Communicated with RN  prior to session.  Patient found supine in beed with  (no active lines)  upon PT entry to room.    General Precautions: Standard, fall   Orthopedic Precautions:N/A   Braces: N/A     Exams:  · Cognitive Exam:  Patient is oriented to Person, Place, Time and Situation  · Fine Motor Coordination: -       Intact  · Gross Motor Coordination:  WFL  · Postural Exam:  Patient presented with the following abnormalities: -       Rounded shoulders  · Sensation: -       Intact  · Skin Integrity/Edema:  -       Skin integrity: Visible skin intact  · RLE ROM: WFL  · RLE Strength: WFL  · LLE ROM: WFL  · LLE Strength: WFL    Functional Mobility:  · Bed Mobility:  Rolling Left:  modified independence  · Supine to Sit: modified independence  · Transfers:  Sit to Stand:  stand by assistance with rolling walker  · Bed to Chair: stand by assistance with  rolling walker  using  Step Transfer  · Gait: x 120 feet SBA with RW; limited by faituge   · Balance: SBA for gait with RW for UE support      Therapeutic Activities and Exercises:   Pt able to participate in LE TE:   Ankle pumps x20  Hip flexion x15  Long arc quads x15    Patient education  · Patient educated on the role of PT and POC  · Patient educated on importance  activity while in the hosptial per tolerance for improved endurance and to limit deconditioning   · Patient educated on safe  transfers with nursing as appropriate  · Patient educated on proper transfer mechanics and safety  · All of patients questions were answered within the scope of PT        AM-PAC 6 CLICK MOBILITY  Total Score:21     Patient left up in chair with all lines intact, call button in reach and daughter present.    GOALS:   Multidisciplinary Problems     Physical Therapy Goals        Problem: Physical Therapy Goal    Goal Priority Disciplines Outcome Goal Variances Interventions   Physical Therapy Goal     PT, PT/OT Ongoing (interventions implemented as appropriate)     Description:  Goals to be met by: 3/14/19     Patient will increase functional independence with mobility by performin. Sit to stand transfer with Supervision  2. Bed to chair transfer with Supervision using RW or LRD.   3. Gait  x 150 feet with Supervision using Rolling Walker or LRD.    4. Ascend/descend 3 stair with bilateral Handrails Minimal Assistance using Rolling Walker or LRD.    5. Stand for 10 minutes with Stand-by Assistance using Rolling Walker or LRD to prepare for standing ADLs.                         History:     Past Medical History:   Diagnosis Date    Cervical cancer     Diabetes mellitus     Hypertension        Past Surgical History:   Procedure Laterality Date    CARDIAC PACEMAKER PLACEMENT         Time Tracking:     PT Received On: 19  PT Start Time: 1143     PT Stop Time: 1153  PT Total Time (min): 10 min     Billable Minutes: Evaluation 10 min      Faheem Alexandra, PT  2019

## 2019-03-04 NOTE — NURSING
Patient is discharged home, all discharge orders given, verbalized understanding of all discharge orders.  Road Test:  O;  Patient uses oxygen prn at 2 liters, A:amblates with assistance , D:  Left forearm iv discontinued, T:  Last bowel movement 3/4/19, voiding wiithout difficulty, E:  Tolerating a 2000 calorie diet without difficulty, S:  Need assistance with  Adl's, T:  Discharge teaching done.

## 2019-03-04 NOTE — DISCHARGE SUMMARY
[facility]  Obstetrics & Gynecology  Discharge Summary    Patient Name: Franklin Christian  MRN: [id number]  Admission Date: 3/1/2019  Hospital Length of Stay: 0 days  Discharge Date and Time:  2019 12:54 PM  Attending Physician: Sara Kat MD   Discharging Provider: GORGE Moon MD  Primary Care Provider: Primary Doctor No    HPI: History of Present Illness:   Franklin Christian is a 73 y.o.   female who is admitted as a [place] from [facility]. She initially presented with her daughter, Tiana, with CC of heavy vaginal bleeding and passage of large blood clots. She reports she was diagnosed with cervical cancer in 2018 but has not been treated.     Prior to the diagnosis, she was having vaginal spotting a few times a month for the past several months. She was to have follow up at a cancer center near , but she decided to move to [place] (where she has lived since moving to the ) to be close to most of her family. She was seen once in [place], but reports she did not follow up. She has since moved back to [place] to be back with her daughter Tiana.      Heavy vaginal bleeding, which has never happened to her before, started on . When she arrived at [facility], she had moore catheter inserted and a packing placed in the vagina. She was transferred to [facility] for Gynecology Oncology care. On arrival, she reports packing fell out during transfer from the OS. She is still bleeding, but it is much lighter and she has not passed clots today. She is voiding via Moore catheter and reports bowel function is at baseline. She complains of lower abdominal/pelvic pain that has been present for several weeks now. She denies decreased appetite, weight loss, nausea/vomiting, increased abdominal size, changes in baseline mentation and functional status recently. Other than pain, she complains of severe and constant itching.      OB Gyn history significant for regular, normal-flow periods since  "menarche and through menopause. Not sure of age of menopause. She had six NSVDs without complications, and has 5 living children. She denies h/o breast, colon, uterine, cervical, or ovarian cancer. She denies known h/o abnormal pap mere until cancer diagnosis.     PMH significant for asthma, COPD (quit smoking cigarettes 28 years ago) dependent on 2L NC at all times, hypertension, type 2 diabetes, CHF with pacemaker in place since 2012, chronic renal failure, and several strokes, most recently in 2018.      Per her daughter, her mother's "normal routine" is to spend 22 of 24 hours every day in bed. She gets up in the morning and watches TV. She is independent in dressing and bathing and eating, but she does not cook. She does not use any assistive devices at home. She is typically oriented, but is very forgetful, and at times gets confused about where she is. She experienced severe delirium after being given morphine at some point in the recent past.         Hospital Course: Patient admitted on 3/1 for management of vaginal bleeding.  Records reviewed. 6/2018 pap showed endometrial adenocarcinoma, HPV negative. Endometrial biopsy complex hyperplasia and adenocarcinoma. No treatment of the last year due to multiple relocations and denial per patient and her daughter.   She remained in the hospital for 3 nights for observation and underwent palliative radation treatment for control of her vaginal bleeding. The patient's course was otherwise uncomplicated and she was discharged on 3/4/2019 to home hospice.      * No surgery found *       Pending Diagnostic Studies:     None        Final Active Diagnoses:    Diagnosis Date Noted POA    PRINCIPAL PROBLEM:  Vaginal bleeding [N93.9] 02/28/2019 Yes    Endometrial cancer [C54.1] 03/04/2019 Unknown    Cancer associated pain [G89.3] 03/04/2019 Unknown      Problems Resolved During this Admission:        Discharged Condition: good    Disposition:  Home hospice    Follow " Up:  Follow-up Information     Call [facility]    Specialty:  Hospice Services  Why:  As needed  Contact information:  [address]  [address]  [place] [id number]  [phone]                 Patient Instructions:      Call MD for:  temperature >100.4     Call MD for:  persistent nausea and vomiting or diarrhea     Call MD for:  severe uncontrolled pain     Call MD for:  redness, tenderness, or signs of infection (pain, swelling, redness, odor or green/yellow discharge around incision site)     Call MD for:  difficulty breathing or increased cough     Call MD for:  severe persistent headache     Call MD for:  increased confusion or weakness     Medications:  Reconciled Home Medications:      Medication List      START taking these medications    HYDROcodone-acetaminophen  mg per tablet  Commonly known as:  NORCO  Take 1 tablet by mouth every 4 to 6 hours as needed.        CONTINUE taking these medications    lisinopril 10 MG tablet  Take 1 tablet (10 mg total) by mouth once daily.            GORGE Moon MD  Obstetrics & Gynecology  [facility]

## 2019-03-04 NOTE — PLAN OF CARE
Problem: Physical Therapy Goal  Goal: Physical Therapy Goal  Goals to be met by: 3/14/19     Patient will increase functional independence with mobility by performin. Sit to stand transfer with Supervision  2. Bed to chair transfer with Supervision using RW or LRD.   3. Gait  x 150 feet with Supervision using Rolling Walker or LRD.    4. Ascend/descend 3 stair with bilateral Handrails Minimal Assistance using Rolling Walker or LRD.    5. Stand for 10 minutes with Stand-by Assistance using Rolling Walker or LRD to prepare for standing ADLs.       Outcome: Ongoing (interventions implemented as appropriate)  Patient evaluated today. All goals established are appropriate for patient progression at this time.     Faheem Alexandra PT, DPT  3/4/2019  Pager: [phone]

## 2019-03-04 NOTE — PROGRESS NOTES
[facility]  Gynecologic Oncology  H&P    Patient Name: Franklin Christian  MRN: [id number]  Admission Date: 3/1/2019  Primary Care Provider: Primary Doctor No   Principal Problem: Vaginal bleeding    Subjective:     Chief Complaint/Reason for Admission: Vaginal bleeding    Interval Changes: No events overnight.    Subjective: Ms. Christian is doing well this morning, however had issues with pain control overnight. She is tolerating a diabetic diet with no N/V. Reports ambulating normally (with baseline difficulty). Reports urinating frequently. Reports no change in baseline CP/SO. Reports improvement in cough with tessalon pearls. No episodes of vaginal bleeding overnight.      Past Medical History:   Diagnosis Date    Cervical cancer     Diabetes mellitus     Hypertension      Past Surgical History:   Procedure Laterality Date    CARDIAC PACEMAKER PLACEMENT       Family History     None        Tobacco Use    Smoking status: Never Smoker    Smokeless tobacco: Never Used   Substance and Sexual Activity    Alcohol use: Not on file    Drug use: Not on file    Sexual activity: Not on file       PTA Medications   Medication Sig    lisinopril 10 MG tablet Take 1 tablet (10 mg total) by mouth once daily.       Review of patient's allergies indicates:   Allergen Reactions    Morphine        Objective:     Vital Signs (Most Recent):  Temp: 97.4 °F (36.3 °C) (03/04/19 0806)  Pulse: 73 (03/04/19 0806)  Resp: 18 (03/04/19 0806)  BP: 139/63 (03/04/19 0806)  SpO2: (!) 94 % (03/04/19 0806) Vital Signs (24h Range):  Temp:  [97.4 °F (36.3 °C)-98.2 °F (36.8 °C)] 97.4 °F (36.3 °C)  Pulse:  [71-80] 73  Resp:  [17-18] 18  SpO2:  [94 %-98 %] 94 %  BP: (139-178)/(63-76) 139/63     Weight: 115.7 kg (255 lb 1.2 oz)  Body mass index is 43.78 kg/m².    Physical Exam:   Constitutional: She is oriented to person, place, and time. She appears well-developed and well-nourished. No distress.    HENT:   Head: Normocephalic  and atraumatic.    Eyes: Conjunctivae and EOM are normal. No scleral icterus.    Neck: Normal range of motion.    Cardiovascular: Normal rate and regular rhythm.     Pulmonary/Chest: Effort normal. No respiratory distress. She has no wheezes.        Abdominal: Soft. Bowel sounds are normal. She exhibits no distension and no mass (no palpable mass, although body habitus limits exam). There is tenderness (tender to palpation over lower abdomen and under lower panus over the pelvis). There is no guarding.     Genitourinary:   Genitourinary Comments: No visible bleeding beneath patient           Musculoskeletal: Moves all extremeties.       Neurological: She is alert and oriented to person, place, and time.    Skin: She is not diaphoretic.    Psychiatric: She has a normal mood and affect.     Laboratory:  CBC:   No results for input(s): WBC, HGB, HCT, PLT in the last 48 hours. and All pertinent labs from the last 24 hours have been reviewed.    Diagnostic Results:  Labs: Reviewed  CT PELVIS WITH AND WITHOUT CONTRAST    CLINICAL HISTORY:  Neoplasm: uterine, suspected;    TECHNIQUE:  CT of the pelvis was performed before and after administration of 100 cc Omnipaque 350 IV contrast.    COMPARISON:  None    FINDINGS:  Uterus is enlarged and heterogenous in appearance with lobular contour in this postmenopausal patient.  There is 5.5 cm intramural lesion, suspected fibroid seen.  Right adnexal/ovarian 4.2 cm cystic lesion is visualized which measures higher than simple fluid density.  Visualized loops of bowel show no evidence of obstruction or inflammation.  Urinary bladder is unremarkable.  No acute osseous abnormality identified.      Impression       1. Enlarged lobular uterus with 5.5 cm intramural lesion suggestive for fibroid, however other potential neoplastic process not excluded in this patient with reported clinical history of uterine neoplasm.  2. Right adnexal/ovarian 4.2 cm cystic lesion.  Differential  considerations include ovarian cyst/complex cyst, paraovarian cyst, or cystic neoplasm.      Electronically signed by: Reta Vargas MD  Date: 02/28/2019  Time: 17:29       Assessment/Plan:     Active Diagnoses:    Diagnosis Date Noted POA    PRINCIPAL PROBLEM:  Vaginal bleeding [N93.9] 02/28/2019 Yes      Problems Resolved During this Admission:     1. Endometrial cancer  - Patient not a candidate for surgery/chemo 2/2 poor performance status with ECOG PS = 3 and medical comorbidites. She spends most of her day in bed or sitting in a chair, only walking to and from the bathroom and kitchen (per daughter, walking even less than here). Dr. Kat discussed with patient and her daughter at length regarding this  Palliation and expected prognosis.  - Rad onc consulted: f/u later this week for possible further radiation  - Scheduled motrin with [place] PRN BTP. Patient and daughter report h/o delirium only with morphine, report taking Norco PRN at home without issue. A + O x 3.  - DNR/DNI    2. Vaginal Bleeding  - resolved    3. Deconditioning  - See HPI for description of functional status  - PT/OT eval/treat. May require assistance at home.  - SCDs and encourage ambulation. TEDs do not fit patient and are very uncomfortable for patient. Discussed okay to remove TEDs as long as SCDs stay in place and functioning.    3. Hypertension  - BP: (140-170)/(62-75) 170/75  - Has CHF and CAD and pacemaker since 2012  - Continue home lisinopril 10mg  - Hydralazine PRN SBP> 180  - Will need to restart home medications when she is discharged    4. T2DM  - Glucose 119 this AM  - POCT glucose checks  - SSI low correction dose   - Diabetic diet    5. COPD  - no longer requiring nasal cannula  - DuoNebs PRN -  - Tessalon Pearls PRN cough    Will talk to social work to confirm hospice arrangements, anticipate discharge today.      Michael Moon MD   PGY-4 Ob-gyn

## 2019-03-08 ENCOUNTER — OFFICE VISIT (OUTPATIENT)
Dept: RADIATION ONCOLOGY | Facility: CLINIC | Age: 74
End: 2019-03-08
Payer: MEDICARE

## 2019-03-08 VITALS
DIASTOLIC BLOOD PRESSURE: 69 MMHG | BODY MASS INDEX: 42.98 KG/M2 | RESPIRATION RATE: 20 BRPM | HEART RATE: 75 BPM | WEIGHT: 250.38 LBS | TEMPERATURE: 98 F | SYSTOLIC BLOOD PRESSURE: 150 MMHG

## 2019-03-08 DIAGNOSIS — C54.1 ENDOMETRIAL CANCER: Primary | ICD-10-CM

## 2019-03-08 PROCEDURE — 99024 PR POST-OP FOLLOW-UP VISIT: ICD-10-PCS | Mod: S$GLB,,, | Performed by: RADIOLOGY

## 2019-03-08 PROCEDURE — 99999 PR PBB SHADOW E&M-EST. PATIENT-LVL III: ICD-10-PCS | Mod: PBBFAC,,, | Performed by: RADIOLOGY

## 2019-03-08 PROCEDURE — 99999 PR PBB SHADOW E&M-EST. PATIENT-LVL III: CPT | Mod: PBBFAC,,, | Performed by: RADIOLOGY

## 2019-03-08 PROCEDURE — 99024 POSTOP FOLLOW-UP VISIT: CPT | Mod: S$GLB,,, | Performed by: RADIOLOGY

## 2019-03-08 RX ORDER — ONDANSETRON 8 MG/1
8 TABLET, ORALLY DISINTEGRATING ORAL EVERY 8 HOURS PRN
Qty: 30 TABLET | Refills: 2 | Status: SHIPPED | OUTPATIENT
Start: 2019-03-08

## 2019-03-08 NOTE — PROGRESS NOTES
03/08/2019    Radiation Oncology Follow-Up Visit    Prior Radiation History: 3/2/19:  8 Gy x1 to pelvis    Assessment   This is a 74 y.o. y/o female with endometrial adenocarcinoma (not staged). She was diagnosed originally in Summer 2018 but has declined treatment. She received palliative RT 8 Gy x1 on 3/2/19 for vaginal bleeding while inpatient at [facility]. She was discharged home with hospice.     VB has resolved. +N/V today.         Plan   1) Zofran Rx sent to pharmacy; advised patient's daughter that hospice should begin managing her symptoms now.   2) Vaginal bleeding resolved. No further RT at this time.  3) Patient to go home with hospice.        Chief Complaint   Patient presents with    Vaginal Bleeding     follow up from inpatient XRT       HPI: Ms. Christian presents today accompanied by her daughter. She had single fraction RT for vaginal bleeding 1 week ago and reports resolution of bleeding. She was discharged home with hospice. Today she c/o N/V and reports that no medication was provided by the hospice team when they performed initial evaluation this week.         Past Medical History:   Diagnosis Date    Asthma     Cervical cancer     Coronary artery disease     pacemaker    Diabetes mellitus     Diabetes mellitus     Hypertension     MI, old     Renal disorder        Past Surgical History:   Procedure Laterality Date    CARDIAC PACEMAKER PLACEMENT      HYSTERECTOMY      SHOULDER SURGERY         Social History     Tobacco Use    Smoking status: Never Smoker    Smokeless tobacco: Never Used   Substance Use Topics    Alcohol use: No    Drug use: No       Cancer-related family history is not on file.    Current Outpatient Medications on File Prior to Visit   Medication Sig Dispense Refill    albuterol 90 mcg/actuation inhaler Inhale 1-2 puffs into the lungs every 6 (six) hours as needed for Wheezing. Rescue 1 Inhaler 0    albuterol-ipratropium (DUO-NEB) 2.5 mg-0.5 mg/3 mL nebulizer solution  Take 3 mLs by nebulization every 4 (four) hours. Rescue  0    aspirin 81 MG Chew Take 1 tablet (81 mg total) by mouth once daily.  0    atorvastatin (LIPITOR) 40 MG tablet Take 1 tablet (40 mg total) by mouth once daily. 90 tablet 3    benzonatate (TESSALON) 100 MG capsule Take 1 capsule (100 mg total) by mouth 3 (three) times daily as needed for Cough. 12 capsule 0    blood sugar diagnostic Strp 100 strips by Misc.(Non-Drug; Combo Route) route 3 (three) times daily before meals. 200 strip 0    buPROPion (WELLBUTRIN SR) 100 MG TBSR 12 hr tablet Take 1 tablet (100 mg total) by mouth 2 (two) times daily. 60 tablet 11    famotidine (PEPCID) 20 MG tablet Take 1 tablet (20 mg total) by mouth once daily.      furosemide (LASIX) 80 MG tablet Take 1 tablet (80 mg total) by mouth 2 (two) times daily. 60 tablet 11    glipiZIDE (GLUCOTROL) 2.5 MG TR24 Take 2 tablets (5 mg total) by mouth daily with breakfast. 180 tablet 3    hydrALAZINE (APRESOLINE) 25 MG tablet Take 1 tablet (25 mg total) by mouth 3 (three) times daily. 90 tablet 11    HYDROcodone-acetaminophen (NORCO)  mg per tablet Take 1 tablet by mouth every 4 to 6 hours as needed. 90 tablet 0    insulin aspart U-100 (NOVOLOG) 100 unit/mL InPn pen Inject 4 Units into the skin 3 (three) times daily. 15 mL 11    lisinopril 10 MG tablet Take 1 tablet (10 mg total) by mouth once daily. 30 tablet 6    oxybutynin (DITROPAN) 5 MG Tab Take 1 tablet (5 mg total) by mouth 2 (two) times daily. 60 tablet 11    carvedilol (COREG) 12.5 MG tablet Take 1 tablet (12.5 mg total) by mouth 2 (two) times daily with meals. 60 tablet 11     No current facility-administered medications on file prior to visit.        Review of patient's allergies indicates:   Allergen Reactions    Morphine Hallucinations    Morphine        Review of Systems   Constitutional: Positive for malaise/fatigue and weight loss. Negative for fever.   HENT: Negative for ear pain and sore throat.     Eyes: Positive for blurred vision and double vision.   Respiratory: Positive for cough and shortness of breath. Negative for hemoptysis.    Cardiovascular: Positive for chest pain and leg swelling.   Gastrointestinal: Positive for abdominal pain, constipation and heartburn. Negative for diarrhea and nausea.   Genitourinary: Positive for dysuria. Negative for hematuria.   Musculoskeletal: Positive for joint pain. Negative for falls.   Neurological: Positive for dizziness, tingling and headaches. Negative for speech change, focal weakness and seizures.   Psychiatric/Behavioral: Positive for depression. The patient is nervous/anxious.         Vital Signs: BP (!) 150/69 (BP Location: Right arm, Patient Position: Sitting)   Pulse 75   Temp 97.9 °F (36.6 °C) (Oral)   Resp 20   Wt 113.6 kg (250 lb 6.4 oz)   LMP  (LMP Unknown)   BMI 42.98 kg/m²     ECOG Performance Status: 4 - Completely disabled    Physical Exam   Constitutional: She is oriented to person, place, and time.   Obese, in wheelchair, distressed from N/V   HENT:   Head: Normocephalic and atraumatic.   Mouth/Throat: Oropharynx is clear and moist.   Eyes: EOM are normal. No scleral icterus.   Neck: Normal range of motion. Neck supple.   Pulmonary/Chest: No accessory muscle usage. No respiratory distress.   Abdominal: Soft. Normal appearance. She exhibits no distension.   Musculoskeletal: Normal range of motion. She exhibits no edema.   Lymphadenopathy:     She has no cervical adenopathy.        Right: No supraclavicular adenopathy present.        Left: No supraclavicular adenopathy present.   Neurological: She is alert and oriented to person, place, and time. No cranial nerve deficit.   Skin: Skin is warm and dry.   Psychiatric: Mood, affect and judgment normal.   Vitals reviewed.       Labs:    Imaging: I have personally reviewed the patient's available images and reports and summarized pertinent findings above in HPI.     Pathology: I have personally  reviewed the patient's available pathology and summarized pertinent findings above in HPI.

## 2019-04-24 NOTE — ASSESSMENT & PLAN NOTE
--admitted to [facility] post tpa  --no LVO  --vascular neuro following  --PTOTSlp  --atorvastatin, aspirin, sqh started  --sbp <180  -- diet started- swallowing well.   --f/u echo, most recent EF 55%  --patient has pacemaker, unable to get MR  --CT head without interval worsening, ttf under Vascular Neurology   24-Apr-2019 09:40

## 2019-05-10 ENCOUNTER — DOCUMENTATION ONLY (OUTPATIENT)
Dept: GYNECOLOGIC ONCOLOGY | Facility: CLINIC | Age: 74
End: 2019-05-10

## 2019-05-10 DIAGNOSIS — C54.1 ENDOMETRIAL CANCER: Primary | ICD-10-CM

## 2019-05-10 NOTE — PROGRESS NOTES
Call received from Giulia ([facility]) requesting a new order for hospice. Pt. Recently moved back from [place] and has decided to re-enroll in hospice. Order received from Dr. Kat and [facility] notified of updated order. Pt. Will be seen for services. Will follow.

## 2023-05-02 NOTE — ED NOTES
Accompanied by son, patient arrives ambulatory albeit with limping and guarding right lower back c/o right lower back and right buttock pain that shoots down the back of the leg. Saw PCP two weeks ago and prescribed ibuprofen 800mg and cyclobenzaprine. Hx of low back surgery. Patient writing in pain at 10/10 while standing or seated; prone his pain is 0/10. Unable to get vitals at this time as patient unable to dress until pain surge resolves. MD aware.   Set up for pelvic exam   show

## 2024-03-29 NOTE — PLAN OF CARE
I have personally seen and examined the patient. I have collaborated with and supervised the Patient accepted as a transfer from [facility] on 3/1/19. Patient presented to [facility] c/o heavy vaginal bleeding with clots. According to MD notes patient dx with endometrial adenocarcinoma in June of 2018. Patient did not receive any type of treatment since her diagnosis according to medical record. Per MD patient is not a candidate for chemotherapy or surgery secondary to her poor performance status and medical co-morbidities. MD discussed prognosis with patient and patient's daughter. Patient received two treatments of palliative radiation this admission. Plans for patient to discharge home today with home hospice. CM discussed patient status with the . SW to assist patient and patient's family in setting up home hospice. CM noted weekend SW started a home hospice referral with [facility]. CM discussed with SW as well. CM to continue to follow with the team.    Magui Hilton, RN, BSN, CM  [facility]  Nurse - Med Onc/Gyn Onc  [phone]

## 2024-09-03 NOTE — ED NOTES
"[facility] Triage Nurse Telephone Encounter    Provider: JUNIE Farrell CNP   Facility: [facility] Facility Type:  TCU    Caller: Beltran  Call Back Number: [phone]    Allergies:    Allergies   Allergen Reactions    Hydrocodone-Acetaminophen Nausea and Vomiting    Amoxicillin Other (See Comments)     Sores in mouth, tongue slightly swollen    Hydrochlorothiazide Unknown    Levofloxacin Other (See Comments)     \"mouth gets raw\"    Morphine Nausea and Vomiting        Reason for call: Nurse is calling to report x-ray results.  See results below:          Verbal Order/Direction given by Provider: No new orders at this time.  Provider to follow up with ortho NP.      Provider giving Order:  JUNIE Farrell CNP     Verbal Order given to: Beltran Guadalupe RN      " Pt taken to x ray

## 2025-02-20 NOTE — PROGRESS NOTES
The Sw just received a call from Christiane at [facility] stating she just received a call from [employer] stating the appeal was upheld and they are not approving snf. The Sw informed aMry of this info and the team. The Sw spoke to Fatuma at [facility] and informed her of the above mentioned info. The Sw spoke to the pt's dtr and informed her of this info and offered her jail nhp using the pt's Medicare Part B but again the pt declined stating she will come get the pt after she gets off from work. The pt's dtr is agreeable to resume hh again. Christiane informed the pt's dtr she has a lot of jail beds but the pt and her dtr are not interested.  The Sw informed the pt's nurse of the above mentioned info.    ambulatory